# Patient Record
Sex: FEMALE | Race: BLACK OR AFRICAN AMERICAN | NOT HISPANIC OR LATINO | Employment: OTHER | URBAN - METROPOLITAN AREA
[De-identification: names, ages, dates, MRNs, and addresses within clinical notes are randomized per-mention and may not be internally consistent; named-entity substitution may affect disease eponyms.]

---

## 2017-01-10 ENCOUNTER — LAB VISIT (OUTPATIENT)
Dept: LAB | Facility: HOSPITAL | Age: 62
End: 2017-01-10
Attending: INTERNAL MEDICINE
Payer: MEDICAID

## 2017-01-10 ENCOUNTER — OFFICE VISIT (OUTPATIENT)
Dept: CARDIOLOGY | Facility: CLINIC | Age: 62
End: 2017-01-10
Payer: MEDICAID

## 2017-01-10 VITALS
HEART RATE: 71 BPM | SYSTOLIC BLOOD PRESSURE: 134 MMHG | WEIGHT: 179 LBS | HEIGHT: 64 IN | BODY MASS INDEX: 30.56 KG/M2 | DIASTOLIC BLOOD PRESSURE: 84 MMHG

## 2017-01-10 DIAGNOSIS — I43 CARDIOMYOPATHY DUE TO HYPERTENSION, WITH HEART FAILURE: ICD-10-CM

## 2017-01-10 DIAGNOSIS — I11.0 CARDIOMYOPATHY DUE TO HYPERTENSION, WITH HEART FAILURE: Primary | ICD-10-CM

## 2017-01-10 DIAGNOSIS — I34.0 MODERATE MITRAL INSUFFICIENCY: ICD-10-CM

## 2017-01-10 DIAGNOSIS — I10 ESSENTIAL HYPERTENSION: ICD-10-CM

## 2017-01-10 DIAGNOSIS — I25.119 CORONARY ARTERY DISEASE INVOLVING NATIVE CORONARY ARTERY OF NATIVE HEART WITH ANGINA PECTORIS: ICD-10-CM

## 2017-01-10 DIAGNOSIS — I11.0 CARDIOMYOPATHY DUE TO HYPERTENSION, WITH HEART FAILURE: ICD-10-CM

## 2017-01-10 DIAGNOSIS — I43 CARDIOMYOPATHY DUE TO HYPERTENSION, WITH HEART FAILURE: Primary | ICD-10-CM

## 2017-01-10 LAB
ANION GAP SERPL CALC-SCNC: 7 MMOL/L
BUN SERPL-MCNC: 20 MG/DL
CALCIUM SERPL-MCNC: 9.6 MG/DL
CHLORIDE SERPL-SCNC: 105 MMOL/L
CO2 SERPL-SCNC: 28 MMOL/L
CREAT SERPL-MCNC: 1.2 MG/DL
EST. GFR  (AFRICAN AMERICAN): 56 ML/MIN/1.73 M^2
EST. GFR  (NON AFRICAN AMERICAN): 49 ML/MIN/1.73 M^2
GLUCOSE SERPL-MCNC: 89 MG/DL
POTASSIUM SERPL-SCNC: 4.1 MMOL/L
SODIUM SERPL-SCNC: 140 MMOL/L

## 2017-01-10 PROCEDURE — 80048 BASIC METABOLIC PNL TOTAL CA: CPT

## 2017-01-10 PROCEDURE — 99999 PR PBB SHADOW E&M-EST. PATIENT-LVL III: CPT | Mod: PBBFAC,,, | Performed by: INTERNAL MEDICINE

## 2017-01-10 PROCEDURE — 99213 OFFICE O/P EST LOW 20 MIN: CPT | Mod: PBBFAC,PO | Performed by: INTERNAL MEDICINE

## 2017-01-10 PROCEDURE — 99214 OFFICE O/P EST MOD 30 MIN: CPT | Mod: S$PBB,,, | Performed by: INTERNAL MEDICINE

## 2017-01-10 PROCEDURE — 36415 COLL VENOUS BLD VENIPUNCTURE: CPT

## 2017-01-10 NOTE — MR AVS SNAPSHOT
[facility]  [address], [address]  [place] [address]  Phone: [phone]                  Christina Tomlinson   1/10/2017 4:00 PM   Office Visit    Description:  Female : 1955   Provider:  Chan Soolrio MD   Department:  [facility]           Reason for Visit     Follow-up           Diagnoses this Visit        Comments    Cardiomyopathy due to hypertension, with heart failure    -  Primary     Coronary artery disease involving native coronary artery of native heart with angina pectoris         Essential hypertension         Moderate mitral insufficiency                To Do List           Future Appointments        Provider Department Dept Phone    1/10/2017 10:40 AM APPOINTMENT LAB, [place] [facility] [phone]    1/10/2017 4:00 PM Chan Solorio MD [facility] [phone]    2017 11:00 AM CARDIOPULMONARY PROCEDURES, [place] [facility] [phone]      Goals (5 Years of Data)     None      Follow-Up and Disposition     Return in about 4 months (around 5/10/2017).    Follow-up and Disposition History      [facility]     [facility] -  Assistance  Registered nurses in the [facility] provide clinical advisement, health education, appointment booking, and other advisory services.  Call for this free service at [phone].             Medications           Message regarding Medications     Verify the changes and/or additions to your medication regime listed below are the same as discussed with your clinician today.  If any of these changes or additions are incorrect, please notify your healthcare provider.             Verify that the below list of medications is an accurate representation of the medications you are currently taking.  If none reported, the list may be blank. If incorrect, please contact your healthcare provider. Carry this list with you in case of  "emergency.           Current Medications     acetaminophen (TYLENOL) 500 MG tablet Take 500 mg by mouth every 6 (six) hours as needed for Pain.    atorvastatin (LIPITOR) 40 MG tablet Take 1 tablet (40 mg total) by mouth once daily.    cholecalciferol, vitamin D3, 3,000 unit Tab Take by mouth.    ferrous gluconate (FERGON) 240 (27 FE) MG tablet Take 480 mg by mouth 3 (three) times daily.    folic acid (FOLVITE) 800 MCG Tab Take 800 mcg by mouth once daily.    furosemide (LASIX) 40 MG tablet Take 1 tablet (40 mg total) by mouth once daily.    hydroxychloroquine (PLAQUENIL) 200 mg tablet Take 200 mg by mouth once daily.    lisinopril (PRINIVIL,ZESTRIL) 20 MG tablet Take 1 tablet (20 mg total) by mouth once daily.    methotrexate 2.5 MG Tab Take 15 mg by mouth.     metoprolol succinate (TOPROL-XL) 50 MG 24 hr tablet Take 1 tablet (50 mg total) by mouth once daily.    aspirin (ECOTRIN) 81 MG EC tablet Take 1 tablet (81 mg total) by mouth once.           Clinical Reference Information           Vital Signs - Last Recorded  Most recent update: 1/10/2017  9:37 AM by Adriane M Toussaint, LPN    BP Pulse Ht Wt BMI    134/84 71 5' 4" (1.626 m) 81.2 kg (179 lb) 30.73 kg/m2      Blood Pressure          Most Recent Value    BP  134/84      Allergies as of 1/10/2017     No Known Allergies      Immunizations Administered on Date of Encounter - 1/10/2017     None      Orders Placed During Today's Visit     Future Labs/Procedures Expected by Expires    Basic metabolic panel  1/10/2017 3/11/2018    2D Echo w/ Color Flow Doppler  As directed 1/10/2018      "

## 2017-01-10 NOTE — PROGRESS NOTES
Subjective:   Patient ID:  Christina Tomlinson is a 61 y.o. female who presents for follow-up of Follow-up      Problem List Items Addressed This Visit        Cardiac    Cardiomyopathy due to hypertension, with heart failure - Primary    Essential hypertension    Coronary artery disease involving native coronary artery of native heart with angina pectoris    Moderate mitral insufficiency          HPI: Patient is doing well with no acute concerns. REYNA with no chest pain. She is no longer working secondary to decrease functional capacity. She is able to walk a block. BP is controlled.        LHC showed moderate disease. RCA small vessel. Mild to moderate MR after diuresis. No mitral valve pathology. MR functional.       Review of Systems   Constitution: Negative.   HENT: Negative.    Eyes: Negative.    Cardiovascular: Negative for chest pain, claudication, cyanosis, dyspnea on exertion, irregular heartbeat, leg swelling, near-syncope, orthopnea, palpitations, paroxysmal nocturnal dyspnea and syncope.   Respiratory: Negative.    Endocrine: Negative.    Hematologic/Lymphatic: Negative.    Skin: Negative.    Musculoskeletal: Negative.    Gastrointestinal: Negative.    Neurological: Negative.    Psychiatric/Behavioral: Negative.      Patient's Medications   New Prescriptions    No medications on file   Previous Medications    ACETAMINOPHEN (TYLENOL) 500 MG TABLET    Take 500 mg by mouth every 6 (six) hours as needed for Pain.    ASPIRIN (ECOTRIN) 81 MG EC TABLET    Take 1 tablet (81 mg total) by mouth once.    ATORVASTATIN (LIPITOR) 40 MG TABLET    Take 1 tablet (40 mg total) by mouth once daily.    CHOLECALCIFEROL, VITAMIN D3, 3,000 UNIT TAB    Take by mouth.    FERROUS GLUCONATE (FERGON) 240 (27 FE) MG TABLET    Take 480 mg by mouth 3 (three) times daily.    FOLIC ACID (FOLVITE) 800 MCG TAB    Take 800 mcg by mouth once daily.    FUROSEMIDE (LASIX) 40 MG TABLET    Take 1 tablet (40 mg total) by mouth once daily.     HYDROXYCHLOROQUINE (PLAQUENIL) 200 MG TABLET    Take 200 mg by mouth once daily.    LISINOPRIL (PRINIVIL,ZESTRIL) 20 MG TABLET    Take 1 tablet (20 mg total) by mouth once daily.    METHOTREXATE 2.5 MG TAB    Take 15 mg by mouth.     METOPROLOL SUCCINATE (TOPROL-XL) 50 MG 24 HR TABLET    Take 1 tablet (50 mg total) by mouth once daily.   Modified Medications    No medications on file   Discontinued Medications    No medications on file       Objective:   Physical Exam   Constitutional: She is oriented to person, place, and time. She appears well-developed and well-nourished. No distress.   Examination of the digits showed no clubbing or cyanosis   HENT:   Head: Normocephalic and atraumatic.   Eyes: Conjunctivae are normal. Pupils are equal, round, and reactive to light. Right eye exhibits no discharge.   Neck: Normal range of motion. Neck supple. No JVD present. No thyromegaly present.   No carotid bruits   Cardiovascular: Normal rate, regular rhythm, S1 normal, S2 normal, intact distal pulses and normal pulses.  PMI is not displaced.  Exam reveals no gallop, no friction rub and no opening snap.    Murmur heard.  Pulmonary/Chest: Effort normal and breath sounds normal. No respiratory distress. She has no wheezes. She has no rales. She exhibits no tenderness.   Abdominal: Soft. Bowel sounds are normal. She exhibits no distension and no mass. There is no tenderness. There is no guarding.   No hepatosplenomegaly   Musculoskeletal: Normal range of motion. She exhibits no edema or tenderness.   Lymphadenopathy:     She has no cervical adenopathy.   Neurological: She is alert and oriented to person, place, and time.   Skin: Skin is warm. No rash noted. She is not diaphoretic. No erythema.   Psychiatric: She has a normal mood and affect.   Nursing note and vitals reviewed.      ECGs reviewed-NSR with LVH  LABS reviewed  Imaging including Echoes reviewed    Assessment:     1. Cardiomyopathy due to hypertension, with heart  failure    2. Coronary artery disease involving native coronary artery of native heart with angina pectoris    3. Essential hypertension    4. Moderate mitral insufficiency        Plan:     Continue current medications  Low salt diet  2D echo complete  BMP  F/u in 4 months.

## 2017-01-16 ENCOUNTER — HOSPITAL ENCOUNTER (OUTPATIENT)
Dept: CARDIOLOGY | Facility: HOSPITAL | Age: 62
Discharge: HOME | End: 2017-01-16
Attending: INTERNAL MEDICINE
Payer: MEDICAID

## 2017-01-16 DIAGNOSIS — I25.119 CORONARY ARTERY DISEASE INVOLVING NATIVE CORONARY ARTERY OF NATIVE HEART WITH ANGINA PECTORIS: ICD-10-CM

## 2017-01-16 DIAGNOSIS — I34.0 MODERATE MITRAL INSUFFICIENCY: ICD-10-CM

## 2017-01-16 DIAGNOSIS — I43 CARDIOMYOPATHY DUE TO HYPERTENSION, WITH HEART FAILURE: ICD-10-CM

## 2017-01-16 DIAGNOSIS — I11.0 CARDIOMYOPATHY DUE TO HYPERTENSION, WITH HEART FAILURE: ICD-10-CM

## 2017-01-16 LAB
DIASTOLIC DYSFUNCTION: NO
ESTIMATED PA SYSTOLIC PRESSURE: 18.68
MITRAL VALVE MOBILITY: NORMAL
MITRAL VALVE REGURGITATION: ABNORMAL
RETIRED EF AND QEF - SEE NOTES: 45 (ref 55–65)
TRICUSPID VALVE REGURGITATION: ABNORMAL

## 2017-01-16 PROCEDURE — 93306 TTE W/DOPPLER COMPLETE: CPT | Mod: PO

## 2017-01-16 PROCEDURE — 93306 TTE W/DOPPLER COMPLETE: CPT | Mod: 26,,, | Performed by: INTERNAL MEDICINE

## 2017-02-20 RX ORDER — LISINOPRIL 20 MG/1
TABLET ORAL
Qty: 30 TABLET | Refills: 6 | Status: SHIPPED | OUTPATIENT
Start: 2017-02-20 | End: 2018-04-09

## 2017-04-04 ENCOUNTER — CLINICAL SUPPORT (OUTPATIENT)
Dept: REHABILITATION | Facility: HOSPITAL | Age: 62
End: 2017-04-04
Attending: INTERNAL MEDICINE
Payer: MEDICAID

## 2017-04-04 DIAGNOSIS — M25.612 DECREASED ROM OF LEFT SHOULDER: ICD-10-CM

## 2017-04-04 DIAGNOSIS — M25.512 LEFT SHOULDER PAIN, UNSPECIFIED CHRONICITY: ICD-10-CM

## 2017-04-04 PROCEDURE — 97165 OT EVAL LOW COMPLEX 30 MIN: CPT

## 2017-04-05 NOTE — PLAN OF CARE
TIME RECORD    Date: 04/04/2017    Start Time:  8:15  Stop Time:  9:00    PROCEDURES:    TIMED  Procedure Min.                 UNTIMED  Procedure Min.   IE 45         Total Timed Minutes:  0  Total Timed Units:  0  Total Untimed Units:  1  Charges Billed/# of units:  1IE    OCCUPATIONAL THERAPY INITIAL EVALUATION & PLAN OF TREATMENT    Patient Name: Christina Tomlisnon  Physician Name:  Dr. Aguirre  Primary Diagnosis:  L shoulder pain, RTC tear  Treatment Diagnosis:  L shoulder pain, decreased ROM  Onset Date:  ~6 mo ago  Eval Date:  4/4/17  Certification Period:  4/4/17 to 6/4/17  Past Medical History:   Past Medical History:   Diagnosis Date    Arthritis     Cardiomyopathy     Coronary artery disease     GERD (gastroesophageal reflux disease)     Hypertension      Past Surgical History:   Procedure Laterality Date    CHOLECYSTECTOMY      COLONOSCOPY N/A 2/1/2016    Procedure: COLONOSCOPY;  Surgeon: Aidan Reynoso Jr., MD;  Location: [facility];  Service: Endoscopy;  Laterality: N/A;    HYSTERECTOMY      JOINT REPLACEMENT Right     TKA    KNEE ARTHROSCOPY      OTHER SURGICAL HISTORY      PARATHYROIDECTOMY      TONSILLECTOMY         Precautions:  standard  Prior Therapy:  Prior therapy on L shoulder ~2 years ago at [facility]  Signs of Abuse: no  Medications: Christina Tomlinson has a current medication list which includes the following prescription(s): acetaminophen, aspirin, atorvastatin, cholecalciferol (vitamin d3), ferrous gluconate, folic acid, furosemide, hydroxychloroquine, lisinopril, methotrexate, and metoprolol succinate.  Nutrition:  WDWN  Prior Level of Function: Independent, but assist prn with dressing  Social History:  Pt lives with her grown daughter. Pt reports she sometimes uses a back scratcher to pull pants up. Pt reports she retired last year.  Functional Deficits Leading to Referral/Nature of Injury:  repetetive strain  Patient Therapy Goals:  To have no more pain  Hand dominance:  "Right  X-Rays/Tests: no recent x-ray or MRI available in chart; pt reports a recent x-ray was taken by Dr. Aguirre    Subjective:  Pt reports "my arm is swollen. Sometimes it swells."  Pain:  During no work: 0/10  While workin-6/10  Sleepin/10  Location of pain: superior/posterior shoulder  **pt also reports pain in hand/wrist in the mornings    Objective:  Sensation Test: Patient denies any numbness/tingling    Observation/Inspection:  depressed scapula, protracted scapula, decreased disassociation of scapula with shoulder ROM    Range of Motion:   Shoulder Right Left Pain/Dysfunction with Movement    AROM AROM    flexion 90 105 Pain with attempted MMT   extension 50 35    abduction 85 85 Pain in B   adduction To neutral 5    Internal rotation 40 30 Pain in B   ER at 90° abd 45 45 Pain in B   ER at 0° abd 65 30    **MMT not tested due to pain    ROM Comments:   Pain at end range    Painful Arc:   Patient demonstrates no painful arc in shoulder flexion or abduction    Special Tests:  Positive: Empty can test and lift off sign    Palpation: (for pain)     Positive: Anterior Subacromial Space, Posterior Subacromial Space, Infraspinatus Region and Supraspinatus Region     Negative: SC joint, Coracoid process and Lateral Subacromial Space    Limitations of Functional Status:   Self Care: requires increase time to don clothes, inability or pain with donning bra and reaching overhead, sometimes needs assist with UE dressing and LE dressing  Work: occasional pain when cleaning  Leisure: denies leisure limitations, "I don't do a whole lot because I have CHF."    FOTO (Focus on Therapeutic Outcomes): 73% limitation with UE function.    Treatment included: OT evaluation, the following exercises (HEP) were instructed and Christina was able to demonstrate them prior to the end of the session. HEP are as follows: pendulums, shoulder shrugs, scapular squeezes, and wall slides as tolerated. Pt instructed on modalities as " needed.       Assessment  This 61 y.o. female referred to Outpatient Occupational Therapy with diagnosis of   Encounter Diagnoses   Name Primary?    Left shoulder pain, unspecified chronicity     Decreased ROM of left shoulder     presents with limitations as described in problem list. Pt also presents with R shoulder deficits.  Pt would benefit from OT treatment for B shoulders. Patient can benefit from Occupational Therapy services for Ultrasound, moist heat, PROM, AAROM, AROM, Theraputic exercises, joint mobs, home exercise program provied with written instructions, ice, Ice massage, strengthening, Theraband Ex, UBE and pulley ex in order to maximize painfree functional use of  bilateral UE. . The following goals were discussed with the patient and she is in agreement with them as to be addressed in the treatment plan.   History Examination Decision Making Complexity Score   Occupational Profile:   Performed expanded hx of pt using chart review and pt interview. Pt is retired, drives, lives with her grown daughter. PLOF independent.    Medical and Therapy History: Prior therapy on shoulder ~2 years ago. Comorbidities include CHF, BMI over 30, HTN, and arthritis    MODERATE     Performance Deficits     Physical  -due to pain, decreased ROM, and weakness, pt requires occasional assist dressing, limited with washing her back, and difficulty reaching overhead    Cognitive  -n/a    Psychosocial:    -n/a    LOW Pt has multiple treatment options, required no modifications during eval.  A comprehensive assessment was performed of pt.    LOW LOW             Problem List:   Decreased function of Right UE, Decreased function of Left UE, Decreased ROM, Increased pain, Decreased strength, Hypomobility, Inability to perform work/tasks, Difficulty sleeping and Inability to perform self care tasks    Rehab Potential: good    Goals to be met in 4 weeks: (5/4/17)  1) Initiate Hep   2) Pt will increase B shoulder AROM by 10  degrees grossly for improved performance with overhead ADL's  3) Pt will report 3/10 pain in (L)shoulder at worst  4) Pt will demonstrate increased MMT to 3+/5 grossly in B shoulders      Goals to be met by discharge:  1) Independent with HEP  2) Pt will demonstrate (L/R) shoulder AROM WFL grossly for [place] with ADL's  3) Pt will demonstrate (L/R) shoulder MMT WFL grossly for [place] with functional activities  4) Independent and pain free with ADL's and IADL's  5) Patient will be able to achieve FOTO score less than or equal to 46% limitation with UE function.     Plan  Recommended Treatment Plan (2 times per week for 8 weeks): Therapeutic Exercise, Functional Activities, Patient Education, Home Exercise Program, ADL Training, Ultrasound/Phonophoresis, Edema Control, Electrical Stimulation/TENS/Interferential, Moist Heat/Ice and Manual Therapy  Other Recommendations:  KT, IASTM as needed. Pt would benefit from OT treatment for bilateral shoulders. Request to treat the R shoulder as well as the L shoulder.    Therapist's Name: BASHIR Stewart  Date: 04/04/2017    I CERTIFY THE NEED FOR THESE SERVICES FURNISHED UNDER THIS PLAN OF TREATMENT AND WHILE UNDER MY CARE    Physician's comments: ________________________________________________________________________________________________________________________________________________      Physician's Name: ___________________________________    I certify the need for these services furnished under this plan of treatment and while under my care.  Everette Aguirre MD

## 2017-04-11 ENCOUNTER — TELEPHONE (OUTPATIENT)
Dept: CARDIOLOGY | Facility: CLINIC | Age: 62
End: 2017-04-11

## 2017-04-11 NOTE — TELEPHONE ENCOUNTER
----- Message from Bg Blackwood sent at 4/11/2017  4:02 PM CDT -----  Contact:  Patient - [phone]  or Jyothi,daughter- [phone]  Patient daughter came into the office and had questions about her mother's medications. States she has new rx for Meloxicam 7.5 mg , prescribed by Dr. Everette Aguirre and also Loratadine 10 mg tab, prescribed by Dr. Mireille Byrnes. Would like to know if it is ok for patient to take. Please call.

## 2017-04-25 ENCOUNTER — CLINICAL SUPPORT (OUTPATIENT)
Dept: REHABILITATION | Facility: HOSPITAL | Age: 62
End: 2017-04-25
Attending: INTERNAL MEDICINE
Payer: MEDICAID

## 2017-04-25 DIAGNOSIS — M25.512 LEFT SHOULDER PAIN, UNSPECIFIED CHRONICITY: ICD-10-CM

## 2017-04-25 DIAGNOSIS — M25.612 DECREASED ROM OF LEFT SHOULDER: ICD-10-CM

## 2017-04-25 PROCEDURE — 97530 THERAPEUTIC ACTIVITIES: CPT

## 2017-04-25 NOTE — PROGRESS NOTES
"TIME RECORD    Date:  04/25/2017    Start Time:  8:05  Stop Time:  8:50  Visit: 2/17    PROCEDURES:    TIMED  Procedure Min.   TA 45                     UNTIMED  Procedure Min.             Total Timed Minutes:  45  Total Timed Units:  3  Total Untimed Units:  0  Charges Billed/# of units:  3TA      Progress/Current Status    Subjective:     Patient ID: Christina Tomlinson is a 61 y.o. female.  Diagnosis:   1. Left shoulder pain, unspecified chronicity     2. Decreased ROM of left shoulder       Pain:   Pt did not rate pain, but reported "a little". Pt reports compliance with HEP. Pt denied pain at end of session.    Objective:     Pt seen by OT this session. Treatment consisted of the following:     Date: 4/25/17    Visit: 1   pulleys 3 min FF   Scapular squeezes with ER 10 reps, against wall with towel roll   Wall slides Not today   PROM 10 reps, all planes   Dowel - FF 10 reps   Serratus punches  10 reps B UE   SL ER/ABD 10 reps, B UE   tband- red    -rows 10 reps   -ext 2 x 10 reps   -IR/ER 10 reps each   -biceps curls 2 x 10 reps   UBE - L2.5 2'30" forward, 2'30" back             Assessment:     Pt tolerated treatment fairly well today without c/o increased pain. Pt reports her pain has improved somewhat since eval. Pt reports compliance with HEP.  Pt cont to present with min pain in L shoulder, weakness, and decreased scapular strength.  Pt with good participation. Pt would cont to benefit from skilled OT services to maximize functional use of B UE    Patient Education/Response:     Cont HEP. Added light shoulder/scapular strengthening with theraband, rows, ext, IR/ER, biceps curls and dowel ex for FF.  Pt given red theraband and handout. Pt verbalized and demonstrated understanding of HEP.    Plans and Goals:     Cont OT poc 2x/week for 8 weeks during certification period 4/4/17 to 6/4/17 in pursuit of established goals.    Goals to be met in 4 weeks: (5/4/17)  1) Initiate Hep   2) Pt will increase B shoulder " AROM by 10 degrees grossly for improved performance with overhead ADL's  3) Pt will report 3/10 pain in (L)shoulder at worst  4) Pt will demonstrate increased MMT to 3+/5 grossly in B shoulders        Goals to be met by discharge:  1) Independent with HEP  2) Pt will demonstrate (L/R) shoulder AROM WFL grossly for [place] with ADL's  3) Pt will demonstrate (L/R) shoulder MMT WFL grossly for [place] with functional activities  4) Independent and pain free with ADL's and IADL's  5) Patient will be able to achieve FOTO score less than or equal to 46% limitation with UE function.     BASHIR Stewart

## 2017-04-27 ENCOUNTER — CLINICAL SUPPORT (OUTPATIENT)
Dept: REHABILITATION | Facility: HOSPITAL | Age: 62
End: 2017-04-27
Attending: INTERNAL MEDICINE
Payer: MEDICAID

## 2017-04-27 DIAGNOSIS — M25.512 LEFT SHOULDER PAIN, UNSPECIFIED CHRONICITY: ICD-10-CM

## 2017-04-27 DIAGNOSIS — M25.612 DECREASED ROM OF LEFT SHOULDER: ICD-10-CM

## 2017-04-27 PROCEDURE — 97530 THERAPEUTIC ACTIVITIES: CPT

## 2017-04-27 NOTE — PROGRESS NOTES
"TIME RECORD    Date:  04/27/2017    Start Time:  8:10  Stop Time:  9:00  Visit: 3/17    PROCEDURES:    TIMED  Procedure Min.   TA 45                     UNTIMED  Procedure Min.   CP 5         Total Timed Minutes:  45  Total Timed Units:  3  Total Untimed Units:  0  Charges Billed/# of units:  3TA      Progress/Current Status    Subjective:     Patient ID: Christina Tomlinson is a 61 y.o. female.  Diagnosis:   1. Left shoulder pain, unspecified chronicity     2. Decreased ROM of left shoulder       Pain:   Pt did not rate pain, but reported "it doesn't feel too bad. My knees are what hurt". Pt reports compliance with HEP.     Objective:     Pt seen by OT this session. Treatment consisted of the following:     Date: 4/27/17    Visit: 3   pulleys 3 min FF   Scapular squeezes with ER 10 reps, against wall with towel roll   Wall slides 10 reps, as tolerated   PROM 10 reps, all planes   Dowel - FF 10 reps   Serratus punches  10 reps B UE   SL ER/ABD 10 reps, B UE   tband- red    -rows 2 x 10 reps   -ext 2 x 10 reps   -IR/ER 2 x 10 reps each   -biceps curls 2 x 10 reps (deferred due to wrist pain, performed elbow flex with 1# x 10 reps, 3 ways)   UBE - L3.5 2'30" forward, 2'30" back         Patient received cold pack x 5 minutes to B shoulders to decrease pain/inflammation and edema following treatment session.       Assessment:     Pt tolerated treatment fairly well today with min c/o increased pain. She reports pain in B shoulders with FF.  Pt also c/o knee and wrist pain today.  Tightness noted at end ROM with PROM.  Pt reports compliance with HEP.  Pt cont to present with min pain in L shoulder, weakness, and decreased scapular strength.  Pt with good participation. Pt would cont to benefit from skilled OT services to maximize functional use of B UE    Patient Education/Response:     Cont HEP. Added light shoulder/scapular strengthening with theraband, rows, ext, IR/ER, biceps curls and dowel ex for FF.  Pt given red " theraband and handout. Pt verbalized and demonstrated understanding of HEP.    Plans and Goals:     Cont OT poc 2x/week for 8 weeks during certification period 4/4/17 to 6/4/17 in pursuit of established goals.    Goals to be met in 4 weeks: (5/4/17)  1) Initiate Hep   2) Pt will increase B shoulder AROM by 10 degrees grossly for improved performance with overhead ADL's  3) Pt will report 3/10 pain in (L)shoulder at worst  4) Pt will demonstrate increased MMT to 3+/5 grossly in B shoulders        Goals to be met by discharge:  1) Independent with HEP  2) Pt will demonstrate (L/R) shoulder AROM WFL grossly for [place] with ADL's  3) Pt will demonstrate (L/R) shoulder MMT WFL grossly for [place] with functional activities  4) Independent and pain free with ADL's and IADL's  5) Patient will be able to achieve FOTO score less than or equal to 46% limitation with UE function.     BASHIR Stewart

## 2017-05-02 ENCOUNTER — CLINICAL SUPPORT (OUTPATIENT)
Dept: REHABILITATION | Facility: HOSPITAL | Age: 62
End: 2017-05-02
Attending: INTERNAL MEDICINE
Payer: MEDICAID

## 2017-05-02 DIAGNOSIS — M25.612 DECREASED ROM OF LEFT SHOULDER: ICD-10-CM

## 2017-05-02 DIAGNOSIS — M25.512 LEFT SHOULDER PAIN, UNSPECIFIED CHRONICITY: ICD-10-CM

## 2017-05-02 PROCEDURE — 97530 THERAPEUTIC ACTIVITIES: CPT

## 2017-05-02 NOTE — PROGRESS NOTES
"TIME RECORD    Date:  05/02/2017    Start Time:  8:00  Stop Time:  8:55  Visit: 4/17    PROCEDURES:    TIMED  Procedure Min.   TA 45 (30 min 1:1, 15 min supervised)                     UNTIMED  Procedure Min.   CP    MHP 10     Total Timed Minutes:  45  Total Timed Units:  3  Total Untimed Units:  0  Charges Billed/# of units:  2TA      Progress/Current Status    Subjective:     Patient ID: Christina Tomlinson is a 61 y.o. female.  Diagnosis:   1. Left shoulder pain, unspecified chronicity     2. Decreased ROM of left shoulder       Pain:   Pt did not rate pain, but reported "it feels a little better today. I was hurting last time. I think it was the ice". Pt reports compliance with HEP.     Objective:     Pt seen by OT this session. Treatment consisted of the following:    Patient received MH x 10 min to B shoulders to increase blood flow, circulation and tissue elasticity prior to therex       Date: 5/2/17    Visit: 4   Pulleys 3 min FF   Scapular squeezes with ER 10 reps, against wall with towel roll   Wall slides 10 reps, as tolerated   PROM- 10 reps, all planes   Dowel - FFABD 2 x 10 reps   Serratus punches-  10 reps B UE   SL ER/ABD- 10 reps, B UE   tband- red    -rows 2 x 10 reps   -ext 2 x 10 reps   -IR/ER 2 x 10 reps each   -biceps curls 2 x 10 reps (deferred due to wrist pain, performed elbow flex with 1# x 10 reps, 3 ways)   UBE - L3.5- 2'30" forward, 2'30" back         Patient declined cold pack       Assessment:     Pt tolerated treatment fairly well today with min c/o increased pain in R deltoid, especially with ABD.   Tightness noted at end ROM with PROM.  Pt reports compliance with HEP.  Pt cont to present with min pain in L/R shoulder, weakness, and decreased scapular strength.  Pt with good participation. Pt would cont to benefit from skilled OT services to maximize functional use of B UE.    Patient Education/Response:     Cont HEP.     Plans and Goals:     Cont OT poc 2x/week for 8 weeks during " certification period 4/4/17 to 6/4/17 in pursuit of established goals.    Goals to be met in 4 weeks: (5/4/17)  1) Initiate Hep   2) Pt will increase B shoulder AROM by 10 degrees grossly for improved performance with overhead ADL's  3) Pt will report 3/10 pain in (L)shoulder at worst  4) Pt will demonstrate increased MMT to 3+/5 grossly in B shoulders        Goals to be met by discharge:  1) Independent with HEP  2) Pt will demonstrate (L/R) shoulder AROM WFL grossly for [place] with ADL's  3) Pt will demonstrate (L/R) shoulder MMT WFL grossly for [place] with functional activities  4) Independent and pain free with ADL's and IADL's  5) Patient will be able to achieve FOTO score less than or equal to 46% limitation with UE function.     BASHIR Stewart

## 2017-05-04 ENCOUNTER — CLINICAL SUPPORT (OUTPATIENT)
Dept: REHABILITATION | Facility: HOSPITAL | Age: 62
End: 2017-05-04
Attending: INTERNAL MEDICINE
Payer: MEDICAID

## 2017-05-04 DIAGNOSIS — M25.612 DECREASED ROM OF LEFT SHOULDER: ICD-10-CM

## 2017-05-04 DIAGNOSIS — M25.512 LEFT SHOULDER PAIN, UNSPECIFIED CHRONICITY: ICD-10-CM

## 2017-05-04 PROCEDURE — 97530 THERAPEUTIC ACTIVITIES: CPT

## 2017-05-04 NOTE — PROGRESS NOTES
"TIME RECORD    Date:  05/04/2017    Start Time:  8:10  Stop Time:  8:55  Visit: 5/17    PROCEDURES:    TIMED  Procedure Min.   TA 45 1:1                     UNTIMED  Procedure Min.   CP    MHP      Total Timed Minutes:  45  Total Timed Units:  3  Total Untimed Units:  0  Charges Billed/# of units:  3TA      Progress/Current Status    Subjective:     Patient ID: Christina Tomlinson is a 61 y.o. female.  Diagnosis:   1. Left shoulder pain, unspecified chronicity     2. Decreased ROM of left shoulder       Pain:   Pt did not rate pain, but reported "I just have a little pain in my L shoulder". Pt reports compliance with HEP.     Objective:     Pt seen by OT this session. Treatment consisted of the following:     Date: 5/4/17    Visit: 5   Pulleys 3 min FF   Scapular squeezes with ER 10 reps, against wall with towel roll   Wall slides 10 reps, as tolerated   PROM- 10 reps, all planes   Dowel - FF/ABD 2 x 10 reps, 10 reps ABD   Serratus punches-  10 reps B UE   SL ER/ABD- 10 reps, B UE   tband- red    -rows 2 x 10 reps   -ext 2 x 10 reps   -IR/ER 2 x 10 reps each   -biceps curls 2 x 10 reps    UBE - L4 2'30" forward, 2'30" back   scap stabilization with ball on wall, shoulder @90 10 reps CW/CCW,  B UE     Patient declined cold pack       Assessment:     Pt tolerated treatment fairly well today with min c/o increased pain in R deltoid, especially with ABD.   Tightness noted at end ROM with PROM with FF and ABD.  Pt reports compliance with HEP.  Pt cont to present with min pain in L/R shoulder, weakness, and decreased scapular strength.  Pt with good participation. Pt progressing fairly well towards goals.  Pt would cont to benefit from skilled OT services to maximize functional use of B UE.    Patient Education/Response:     Cont HEP.     Plans and Goals:     Cont OT poc 2x/week for 8 weeks during certification period 4/4/17 to 6/4/17 in pursuit of established goals. Reassess next visit    Goals to be met in 4 weeks: " (5/4/17)  1) Initiate Hep   2) Pt will increase B shoulder AROM by 10 degrees grossly for improved performance with overhead ADL's  3) Pt will report 3/10 pain in (L)shoulder at worst  4) Pt will demonstrate increased MMT to 3+/5 grossly in B shoulders        Goals to be met by discharge:  1) Independent with HEP  2) Pt will demonstrate (L/R) shoulder AROM WFL grossly for [place] with ADL's  3) Pt will demonstrate (L/R) shoulder MMT WFL grossly for [place] with functional activities  4) Independent and pain free with ADL's and IADL's  5) Patient will be able to achieve FOTO score less than or equal to 46% limitation with UE function.     BASHIR Stewart

## 2017-05-09 ENCOUNTER — CLINICAL SUPPORT (OUTPATIENT)
Dept: REHABILITATION | Facility: HOSPITAL | Age: 62
End: 2017-05-09
Attending: INTERNAL MEDICINE
Payer: MEDICAID

## 2017-05-09 DIAGNOSIS — M25.512 LEFT SHOULDER PAIN, UNSPECIFIED CHRONICITY: ICD-10-CM

## 2017-05-09 DIAGNOSIS — M25.612 DECREASED ROM OF LEFT SHOULDER: ICD-10-CM

## 2017-05-09 PROCEDURE — 97530 THERAPEUTIC ACTIVITIES: CPT

## 2017-05-09 NOTE — PROGRESS NOTES
"TIME RECORD    Date:  05/09/2017    Start Time:  8:05  Stop Time:  8:55  Visit: 6/17    PROCEDURES:    TIMED  Procedure Min.   TA 45 (30 min 1:1, 15 min supervised)                     UNTIMED  Procedure Min.   CP    MHP 5     Total Timed Minutes:  45  Total Timed Units:  3  Total Untimed Units:  0  Charges Billed/# of units:  2TA      Progress/Current Status    Subjective:     Patient ID: Christina Tomlinson is a 61 y.o. female.  Diagnosis:   1. Left shoulder pain, unspecified chronicity     2. Decreased ROM of left shoulder       Pain:   Pt did not rate pain, but reported "I am doing ok. My shoulders aren't really hurting much today". Pt reports compliance with HEP.     Objective:     Pt seen by OT this session. Treatment consisted of the following:     Date: 5/9/17    Visit: 6   Pulleys 3 min FF   Scapular squeezes with ER 10 reps, against wall with towel roll   Wall slides 10 reps, as tolerated   PROM- 10 reps, all planes   Dowel - FF/ABD 2 x 10 reps, 10 reps ABD   Serratus punches-  10 reps B UE   SL ER/ABD- 10 reps, B UE   tband- red    -rows 2 x 10 reps   -ext 2 x 10 reps   -IR/ER 2 x 10 reps each   -biceps curls 2 x 10 reps    UBE - L4 2'30" forward, 2'30" back   scap stabilization with ball on wall, shoulder @90 10 reps CW/CCW,  B UE     Patient declined cold pack       Assessment:     Pt tolerated treatment fairly well today with cont min c/o increased pain in R deltoid, especially with ABD.   Tightness noted at end ROM with PROM with FF and ABD.  Pt reports compliance with HEP.  Pt cont to present with min pain in L/R shoulder, weakness, and decreased scapular strength.  Pt with good participation. Pt progressing fairly well towards goals.  Pt would cont to benefit from skilled OT services to maximize functional use of B UE.    Patient Education/Response:     Cont HEP.     Plans and Goals:     Cont OT poc 2x/week for 8 weeks during certification period 4/4/17 to 6/4/17 in pursuit of established goals. " Reassess next visit    Goals to be met in 4 weeks: (5/4/17)  1) Initiate Hep   2) Pt will increase B shoulder AROM by 10 degrees grossly for improved performance with overhead ADL's  3) Pt will report 3/10 pain in (L)shoulder at worst  4) Pt will demonstrate increased MMT to 3+/5 grossly in B shoulders        Goals to be met by discharge:  1) Independent with HEP  2) Pt will demonstrate (L/R) shoulder AROM WFL grossly for [place] with ADL's  3) Pt will demonstrate (L/R) shoulder MMT WFL grossly for [place] with functional activities  4) Independent and pain free with ADL's and IADL's  5) Patient will be able to achieve FOTO score less than or equal to 46% limitation with UE function.     BASHIR Stewart

## 2017-05-11 ENCOUNTER — OFFICE VISIT (OUTPATIENT)
Dept: CARDIOLOGY | Facility: CLINIC | Age: 62
End: 2017-05-11
Payer: MEDICAID

## 2017-05-11 VITALS
HEIGHT: 64 IN | SYSTOLIC BLOOD PRESSURE: 140 MMHG | OXYGEN SATURATION: 96 % | DIASTOLIC BLOOD PRESSURE: 83 MMHG | HEART RATE: 98 BPM | WEIGHT: 191.81 LBS | BODY MASS INDEX: 32.74 KG/M2

## 2017-05-11 DIAGNOSIS — I34.0 MODERATE MITRAL INSUFFICIENCY: ICD-10-CM

## 2017-05-11 DIAGNOSIS — I10 ESSENTIAL HYPERTENSION: ICD-10-CM

## 2017-05-11 DIAGNOSIS — I25.119 CORONARY ARTERY DISEASE INVOLVING NATIVE CORONARY ARTERY OF NATIVE HEART WITH ANGINA PECTORIS: ICD-10-CM

## 2017-05-11 DIAGNOSIS — I42.9 CARDIOMYOPATHY, UNSPECIFIED TYPE: Primary | ICD-10-CM

## 2017-05-11 PROCEDURE — 99214 OFFICE O/P EST MOD 30 MIN: CPT | Mod: S$PBB,,, | Performed by: INTERNAL MEDICINE

## 2017-05-11 PROCEDURE — 99213 OFFICE O/P EST LOW 20 MIN: CPT | Mod: PBBFAC,PO | Performed by: INTERNAL MEDICINE

## 2017-05-11 PROCEDURE — 99999 PR PBB SHADOW E&M-EST. PATIENT-LVL III: CPT | Mod: PBBFAC,,, | Performed by: INTERNAL MEDICINE

## 2017-05-11 NOTE — PROGRESS NOTES
Subjective:   Patient ID:  Christina Tomlinson is a 61 y.o. female who presents for follow-up of Follow-up      Problem List Items Addressed This Visit        Cardiac    Essential hypertension    Cardiomyopathy - Primary    Coronary artery disease involving native coronary artery of native heart with angina pectoris    Moderate mitral insufficiency          HPI: Patient is doing well with no acute concerns. REYNA with no chest pain. She is officially retired. She has gained 11 lbs since previous visit. She is trying to be more active. We discuss diet and activity that is acceptable.     No orthopnea or PND. BP is controlled. She is compliant with medications.       LHC showed moderate disease. RCA small vessel. Mild to moderate MR after diuresis. No mitral valve pathology. MR functional.       Review of Systems   Constitution: Negative.   HENT: Negative.    Eyes: Negative.    Cardiovascular: Negative for chest pain, claudication, cyanosis, dyspnea on exertion, irregular heartbeat, leg swelling, near-syncope, orthopnea, palpitations, paroxysmal nocturnal dyspnea and syncope.   Respiratory: Negative.    Endocrine: Negative.    Hematologic/Lymphatic: Negative.    Skin: Negative.    Musculoskeletal: Negative.    Gastrointestinal: Negative.    Neurological: Negative.    Psychiatric/Behavioral: Negative.      Patient's Medications   New Prescriptions    No medications on file   Previous Medications    ACETAMINOPHEN (TYLENOL) 500 MG TABLET    Take 500 mg by mouth every 6 (six) hours as needed for Pain.    ASPIRIN (ECOTRIN) 81 MG EC TABLET    Take 1 tablet (81 mg total) by mouth once.    ATORVASTATIN (LIPITOR) 40 MG TABLET    Take 1 tablet (40 mg total) by mouth once daily.    CHOLECALCIFEROL, VITAMIN D3, 3,000 UNIT TAB    Take by mouth.    FERROUS GLUCONATE (FERGON) 240 (27 FE) MG TABLET    Take 480 mg by mouth 3 (three) times daily.    FOLIC ACID (FOLVITE) 800 MCG TAB    Take 800 mcg by mouth once daily.    FUROSEMIDE  (LASIX) 40 MG TABLET    Take 1 tablet (40 mg total) by mouth once daily.    HYDROXYCHLOROQUINE (PLAQUENIL) 200 MG TABLET    Take 200 mg by mouth once daily.    LISINOPRIL (PRINIVIL,ZESTRIL) 20 MG TABLET    TAKE ONE TABLET BY MOUTH ONCE DAILY    METHOTREXATE 2.5 MG TAB    Take 15 mg by mouth.     METOPROLOL SUCCINATE (TOPROL-XL) 50 MG 24 HR TABLET    Take 1 tablet (50 mg total) by mouth once daily.   Modified Medications    No medications on file   Discontinued Medications    No medications on file       Objective:   Physical Exam   Constitutional: She is oriented to person, place, and time. She appears well-developed and well-nourished. No distress.   Examination of the digits showed no clubbing or cyanosis   HENT:   Head: Normocephalic and atraumatic.   Eyes: Conjunctivae are normal. Pupils are equal, round, and reactive to light. Right eye exhibits no discharge.   Neck: Normal range of motion. Neck supple. No JVD present. No thyromegaly present.   No carotid bruits   Cardiovascular: Normal rate, regular rhythm, S1 normal, S2 normal, intact distal pulses and normal pulses.  PMI is not displaced.  Exam reveals no gallop, no friction rub and no opening snap.    Murmur heard.  Pulmonary/Chest: Effort normal and breath sounds normal. No respiratory distress. She has no wheezes. She has no rales. She exhibits no tenderness.   Abdominal: Soft. Bowel sounds are normal. She exhibits no distension and no mass. There is no tenderness. There is no guarding.   No hepatosplenomegaly   Musculoskeletal: Normal range of motion. She exhibits no edema or tenderness.   Lymphadenopathy:     She has no cervical adenopathy.   Neurological: She is alert and oriented to person, place, and time.   Skin: Skin is warm. No rash noted. She is not diaphoretic. No erythema.   Psychiatric: She has a normal mood and affect.   Nursing note and vitals reviewed.      ECGs reviewed-NSR with LVH  LABS reviewed  Imaging including Echoes reviewed- ef 45%  with moderate MR  Angiographic Results     Diagnostic:          Patient has a left dominant coronary artery.        - Left Main Coronary Artery:             The ostial LM is normal. There is ARIELA 3 flow.     - Left Anterior Descending Artery:             The LAD is normal. There is ARIELA 3 flow.     - D1:             The D1 has luminal irregularities. There is ARIELA 3 flow.     - Ramus:             The proximal ramus has a 60% stenosis. There is ARIELA 3 flow.     - Left Circumflex Artery:             The LCX is normal. There is ARIELA 3 flow.     - Right Coronary Artery:             The proximal RCA has a 75% stenosis. There is ARIELA 3 flow. The remaining portion of the vessel is of small caliber.      Assessment:     1. Cardiomyopathy, unspecified type    2. Moderate mitral insufficiency    3. Coronary artery disease involving native coronary artery of native heart with angina pectoris    4. Essential hypertension        Plan:     Continue current medications  Low salt diet  Activity as tolerate  F/u in 4 months.

## 2017-05-11 NOTE — MR AVS SNAPSHOT
[facility]  [address], [address]  [place] [address]  Phone: [phone]                  Christina Tomlinson   2017 8:40 AM   Office Visit    Description:  Female : 1955   Provider:  Chan Solorio MD   Department:  [facility]           Reason for Visit     Follow-up           Diagnoses this Visit        Comments    Cardiomyopathy, unspecified type    -  Primary     Moderate mitral insufficiency         Coronary artery disease involving native coronary artery of native heart with angina pectoris         Essential hypertension                To Do List           Future Appointments        Provider Department Dept Phone    2017 1:20 PM Chan Solorio MD [facility] [phone]      Goals (5 Years of Data)     None      Follow-Up and Disposition     Return in about 4 months (around 2017).    Follow-up and Disposition History      [facility]     [facility] -  Assistance  Unless otherwise directed by your provider, please contact [facility], our nurse care line that is available for  assistance.     Registered nurses in the [facility] provide: appointment scheduling, clinical advisement, health education, and other advisory services.  Call: [phone] (toll free)               Medications           Message regarding Medications     Verify the changes and/or additions to your medication regime listed below are the same as discussed with your clinician today.  If any of these changes or additions are incorrect, please notify your healthcare provider.             Verify that the below list of medications is an accurate representation of the medications you are currently taking.  If none reported, the list may be blank. If incorrect, please contact your healthcare provider. Carry this list with you in case of emergency.           Current Medications     acetaminophen (TYLENOL) 500 MG tablet Take  "500 mg by mouth every 6 (six) hours as needed for Pain.    aspirin (ECOTRIN) 81 MG EC tablet Take 1 tablet (81 mg total) by mouth once.    atorvastatin (LIPITOR) 40 MG tablet Take 1 tablet (40 mg total) by mouth once daily.    cholecalciferol, vitamin D3, 3,000 unit Tab Take by mouth.    ferrous gluconate (FERGON) 240 (27 FE) MG tablet Take 480 mg by mouth 3 (three) times daily.    folic acid (FOLVITE) 800 MCG Tab Take 800 mcg by mouth once daily.    furosemide (LASIX) 40 MG tablet Take 1 tablet (40 mg total) by mouth once daily.    hydroxychloroquine (PLAQUENIL) 200 mg tablet Take 200 mg by mouth once daily.    lisinopril (PRINIVIL,ZESTRIL) 20 MG tablet TAKE ONE TABLET BY MOUTH ONCE DAILY    methotrexate 2.5 MG Tab Take 15 mg by mouth.     metoprolol succinate (TOPROL-XL) 50 MG 24 hr tablet Take 1 tablet (50 mg total) by mouth once daily.           Clinical Reference Information           Your Vitals Were     BP Pulse Height Weight SpO2 BMI    140/83 98 5' 4" (1.626 m) 87 kg (191 lb 12.8 oz) 96% 32.92 kg/m2      Blood Pressure          Most Recent Value    BP  (!)  140/83      Allergies as of 5/11/2017     No Known Allergies      Immunizations Administered on Date of Encounter - 5/11/2017     None      Language Assistance Services     ATTENTION: Language assistance services are available, free of charge. Please call [phone].      ATENCIÓN: Si habla aman, tiene a campos disposición servicios gratuitos de asistencia lingüística. Llame al [phone].     [facility] Ý: N?u b?n nói Ti?ng Vi?t, có các d?ch v? h? tr? ngôn ng? mi?n phí dành cho b?n. G?i s? [phone].         [facility] complies with applicable Federal civil rights laws and does not discriminate on the basis of race, color, national origin, age, disability, or sex.        "

## 2017-05-16 ENCOUNTER — CLINICAL SUPPORT (OUTPATIENT)
Dept: REHABILITATION | Facility: HOSPITAL | Age: 62
End: 2017-05-16
Attending: INTERNAL MEDICINE
Payer: MEDICAID

## 2017-05-16 DIAGNOSIS — M25.512 LEFT SHOULDER PAIN, UNSPECIFIED CHRONICITY: ICD-10-CM

## 2017-05-16 DIAGNOSIS — M25.612 DECREASED ROM OF LEFT SHOULDER: ICD-10-CM

## 2017-05-16 PROCEDURE — 97530 THERAPEUTIC ACTIVITIES: CPT

## 2017-05-16 NOTE — PROGRESS NOTES
"TIME RECORD    Date:  05/16/2017    Start Time:  8:10  Stop Time:  9:00  Visit: 7/17    PROCEDURES:    TIMED  Procedure Min.   TA 50 (30 min 1:1, 20 min supervised)                     UNTIMED  Procedure Min.   CP    MHP 5     Total Timed Minutes:  45  Total Timed Units:  3  Total Untimed Units:  0  Charges Billed/# of units:  2TA      Progress/Current Status    Subjective:     Patient ID: Christina Tomlinson is a 61 y.o. female.  Diagnosis:   1. Left shoulder pain, unspecified chronicity     2. Decreased ROM of left shoulder       Pain:   Pt did not rate pain, but reported "I am feeling ok today. My arm was hurting over the weekend". Pt reports compliance with HEP.     Objective:     Pt seen by OT this session. Treatment consisted of the following:     Date: 5/16/17    Visit: 7   Pulleys 3 min FF   Scapular squeezes with ER 10 reps, against wall with towel roll   Wall slides 10 reps, as tolerated   PROM- 10 reps, all planes   Dowel - FF 2 x 10 reps,    Serratus punches-  10 reps B UE   SL ER/ABD- 2 x 10 reps, B UE, 1# with ER   tband- red    -rows 2 x 10 reps   -ext 2 x 10 reps   -IR/ER 2 x 10 reps each   -biceps curls 2 x 10 reps    UBE - L4 2' forward, 2' back   scap stabilization with ball on wall, shoulder @90 10 reps CW/CCW,  B UE     Patient declined cold pack       Assessment:     Pt tolerated treatment fairly well today without c/o pain.   Tightness noted at end ROM with PROM with FF and ABD.  Pt reports compliance with HEP.  Pt cont to present with occasional min pain in L/R shoulder, weakness, and decreased scapular strength.  Pt with good participation. Pt progressing fairly well towards goals.  Pt would cont to benefit from skilled OT services to maximize functional use of B UE.    Patient Education/Response:     Cont HEP.     Plans and Goals:     Cont OT poc 2x/week for 8 weeks during certification period 4/4/17 to 6/4/17 in pursuit of established goals. Reassess next visit    Goals to be met in 4 " weeks: (5/4/17)  1) Initiate Hep   2) Pt will increase B shoulder AROM by 10 degrees grossly for improved performance with overhead ADL's  3) Pt will report 3/10 pain in (L)shoulder at worst  4) Pt will demonstrate increased MMT to 3+/5 grossly in B shoulders        Goals to be met by discharge:  1) Independent with HEP  2) Pt will demonstrate (L/R) shoulder AROM WFL grossly for [place] with ADL's  3) Pt will demonstrate (L/R) shoulder MMT WFL grossly for [place] with functional activities  4) Independent and pain free with ADL's and IADL's  5) Patient will be able to achieve FOTO score less than or equal to 46% limitation with UE function.     BASHIR Stewart

## 2017-05-23 ENCOUNTER — CLINICAL SUPPORT (OUTPATIENT)
Dept: REHABILITATION | Facility: HOSPITAL | Age: 62
End: 2017-05-23
Attending: INTERNAL MEDICINE
Payer: MEDICAID

## 2017-05-23 DIAGNOSIS — M25.512 LEFT SHOULDER PAIN, UNSPECIFIED CHRONICITY: ICD-10-CM

## 2017-05-23 DIAGNOSIS — M25.612 DECREASED ROM OF LEFT SHOULDER: ICD-10-CM

## 2017-05-23 PROCEDURE — 97530 THERAPEUTIC ACTIVITIES: CPT

## 2017-05-23 NOTE — PROGRESS NOTES
"TIME RECORD    Date:  05/23/2017    Start Time:  9:50  Stop Time:  10:50  Visit: 8/17    PROCEDURES:    TIMED  Procedure Min.   TA 60 (10 min supervised, 50 min 1:1)                     UNTIMED  Procedure Min.   CP    MHP      Total Timed Minutes:  60  Total Timed Units:  4  Total Untimed Units:  0  Charges Billed/# of units:  3TA      Progress/Current Status    Subjective:     Patient ID: Christina Tomlinson is a 61 y.o. female.  Diagnosis:   1. Left shoulder pain, unspecified chronicity     2. Decreased ROM of left shoulder       Pain: 0/10 at rest  Pt reported "I am feeling pretty good". Pt reports compliance with HEP.     Objective:     Pt seen by OT this session. Treatment consisted of the following:     Date: 5/23/17    Visit: 8   Pulleys 3 min FF   Scapular squeezes with ER 10 reps, against wall with towel roll   Wall slides 10 reps, as tolerated   PROM- 10 reps, all planes   Dowel - FF 2 x 10 reps, 2#   Serratus punches-  10 reps B UE   SL ER/ABD- 2 x 10 reps, B UE, 1# with ER   tband- red    -rows 3 x 10 reps   -ext 3 x 10 reps   -IR/ER 2 x 10 reps each   -biceps curls 2 x 10 reps    UBE - L4 2'30" forward, 2'30" back   scap stabilization with ball on wall, shoulder @90 10 reps CW/CCW,  B UE     Patient declined cold pack       Assessment:     Pt tolerated treatment fairly well today without c/o pain.   Tightness noted at end ROM in R shoulder with PROM with FF and ABD.  Pt reports compliance with HEP.  Pt cont to present with occasional min pain in L/R shoulder, weakness, and decreased scapular strength.  Pt with good participation. Pt progressing fairly well towards goals.  Pt would cont to benefit from skilled OT services to maximize functional use of B UE.    Patient Education/Response:     Cont HEP.     Plans and Goals:     Cont OT poc 2x/week for 8 weeks during certification period 4/4/17 to 6/4/17 in pursuit of established goals. Reassess next visit    Goals to be met in 4 weeks: (5/4/17)  1) Initiate " Hep   2) Pt will increase B shoulder AROM by 10 degrees grossly for improved performance with overhead ADL's  3) Pt will report 3/10 pain in (L)shoulder at worst  4) Pt will demonstrate increased MMT to 3+/5 grossly in B shoulders        Goals to be met by discharge:  1) Independent with HEP  2) Pt will demonstrate (L/R) shoulder AROM WFL grossly for [place] with ADL's  3) Pt will demonstrate (L/R) shoulder MMT WFL grossly for [place] with functional activities  4) Independent and pain free with ADL's and IADL's  5) Patient will be able to achieve FOTO score less than or equal to 46% limitation with UE function.     BASHIR Stewart

## 2017-05-25 ENCOUNTER — CLINICAL SUPPORT (OUTPATIENT)
Dept: REHABILITATION | Facility: HOSPITAL | Age: 62
End: 2017-05-25
Attending: INTERNAL MEDICINE
Payer: MEDICAID

## 2017-05-25 DIAGNOSIS — M25.612 DECREASED ROM OF LEFT SHOULDER: ICD-10-CM

## 2017-05-25 DIAGNOSIS — M25.512 LEFT SHOULDER PAIN, UNSPECIFIED CHRONICITY: ICD-10-CM

## 2017-05-25 PROCEDURE — 97530 THERAPEUTIC ACTIVITIES: CPT

## 2017-05-25 NOTE — PROGRESS NOTES
"TIME RECORD    Date:  05/25/2017    Start Time:  9:10  Stop Time:  9:55  Visit: 8/17    PROCEDURES:    TIMED  Procedure Min.   TA 45 (30 min 1:1, 15 min supervised)                     UNTIMED  Procedure Min.   CP    MHP      Total Timed Minutes:  45  Total Timed Units:  3  Total Untimed Units:  0  Charges Billed/# of units:  2TA      Progress/Current Status    Subjective:     Patient ID: Christina Tomlinson is a 61 y.o. female.  Diagnosis:   1. Left shoulder pain, unspecified chronicity     2. Decreased ROM of left shoulder       Pain: 0/10 at rest  Pt reported "I am feeling pretty good. I think I've been getting better. I don't have as much pain as I used to." Pt reports compliance with HEP.     Objective:     Range of Motion:   Shoulder Right Left Pain/Dysfunction with Movement     AROM AROM     flexion 100 (+10) 110 (+5) Pain with attempted MMT   extension 50 (=) 48 (+13)     abduction 100 (+15) 100 (+15) Pain at end range with L   adduction 15 (+15) 10 (+5)     Internal rotation 70 (+30) 75 (+45)    ER at 90° abd 72 (+17) 60 (+15)    ER at 0° abd 70 (+5) 35 (+5)       FOTO score: 38% limitation with UE function (35% improvement since eval)      Pt seen by OT this session. Treatment consisted of the following:     Date: 5/25/17    Visit: 8   Pulleys 3 min FF   Scapular squeezes with ER 10 reps, against wall with towel roll   Wall slides 10 reps, as tolerated   PROM- 10 reps, all planes   Dowel - FF 2 x 10 reps, 2#   Serratus punches- 10 reps B UE   SL ER/ABD- 2 x 10 reps, B UE, 1#    tband- red    -rows 3 x 10 reps   -ext 3 x 10 reps   -IR/ER 2 x 10 reps each   -biceps curls 2 x 10 reps    UBE - L5 2'30" forward, 2'30" back   scap stabilization with ball on wall, shoulder @90 10 reps CW/CCW,  B UE     Patient declined cold pack       Assessment:     Pt tolerated treatment fairly well today without c/o increased pain.  Pt demonstrates improved B shoulder ROM and pain.  FOTO score significantly improved. Tightness " still noted at end ROM in R shoulder with PROM with FF and ABD.  Pt reports compliance with HEP.  Pt cont to present with occasional min pain in L/R shoulder, weakness, and decreased scapular strength.  Pt with good participation. Pt progressing fairly well towards goals.  Pt would cont to benefit from skilled OT services to maximize functional use of B UE.    Patient Education/Response:     Cont HEP.     Plans and Goals:     Cont OT poc 2x/week for 8 weeks during certification period 4/4/17 to 6/4/17 in pursuit of established goals.     Goals to be met in 4 weeks: (5/4/17)  1) Initiate Hep ---met  2) Pt will increase B shoulder AROM by 10 degrees grossly for improved performance with overhead ADL's---met  3) Pt will report 3/10 pain in (L)shoulder at worst---met  4) Pt will demonstrate increased MMT to 3+/5 grossly in B shoulders---not fully met        Goals to be met by discharge:  1) Independent with HEP  2) Pt will demonstrate (L/R) shoulder AROM WFL grossly for [place] with ADL's  3) Pt will demonstrate (L/R) shoulder MMT WFL grossly for [place] with functional activities  4) Independent and pain free with ADL's and IADL's  5) Patient will be able to achieve FOTO score less than or equal to 46% limitation with UE function.     BASHIR Stewart

## 2017-05-30 ENCOUNTER — CLINICAL SUPPORT (OUTPATIENT)
Dept: REHABILITATION | Facility: HOSPITAL | Age: 62
End: 2017-05-30
Attending: INTERNAL MEDICINE
Payer: MEDICAID

## 2017-05-30 DIAGNOSIS — M25.512 LEFT SHOULDER PAIN, UNSPECIFIED CHRONICITY: ICD-10-CM

## 2017-05-30 DIAGNOSIS — M25.612 DECREASED ROM OF LEFT SHOULDER: ICD-10-CM

## 2017-05-30 PROCEDURE — 97530 THERAPEUTIC ACTIVITIES: CPT

## 2017-05-30 NOTE — PROGRESS NOTES
"TIME RECORD    Date:  05/30/2017    Start Time:  8:05  Stop Time:  9:00  Visit: 9/17    PROCEDURES:    TIMED  Procedure Min.   TA 55 (45 min 1:1, 10 min supervised)                     UNTIMED  Procedure Min.   CP    MHP      Total Timed Minutes:  55  Total Timed Units:  4  Total Untimed Units:  0  Charges Billed/# of units:  3TA      Progress/Current Status    Subjective:     Patient ID: Christina Tomlinson is a 61 y.o. female.  Diagnosis:   1. Left shoulder pain, unspecified chronicity     2. Decreased ROM of left shoulder       Pain: 0/10 at rest, but pt c/o  Pain in wrists and hands  Pt reported "I feel ok. My hand is hurting." Pt reports compliance with HEP.     Objective:     Range of Motion:   Shoulder Right Left Pain/Dysfunction with Movement     AROM AROM     flexion 100 (+10) 110 (+5) Pain with attempted MMT   extension 50 (=) 48 (+13)     abduction 100 (+15) 100 (+15) Pain at end range with L   adduction 15 (+15) 10 (+5)     Internal rotation 70 (+30) 75 (+45)    ER at 90° abd 72 (+17) 60 (+15)    ER at 0° abd 70 (+5) 35 (+5)       FOTO score: 38% limitation with UE function (35% improvement since eval)      Pt seen by OT this session. Treatment consisted of the following:     Date: 5/29/17    Visit: 9   Pulleys 3 min FF   Scapular squeezes with ER 10 reps, against wall with towel roll   Wall slides 10 reps, as tolerated   PROM- 10 reps, all planes   Dowel - FF 2 x 10 reps, 2#   Serratus punches- 10 reps B UE   SL ER/ABD- 2 x 10 reps, B UE, 1#    tband- red    -rows 3 x 10 reps   -ext 3 x 10 reps   -IR/ER 2 x 10 reps each   -biceps curls 2 x 10 reps    UBE - L5 2' forward, 2' back   scap stabilization with ball on wall, shoulder @90 10 reps CW/CCW,  B UE (not today)     Patient declined cold pack       Assessment:     Pt tolerated treatment fairly well today without c/o increased pain.  Tightness still noted at end ROM in R shoulder with PROM with FF and ABD.  Pt reports compliance with HEP.  Pt reported B " hand pain due to arthritis. Educated pt on joint protection techniques and education and on home paraffin bath units. Pt cont to present with occasional min pain in L/R shoulder, weakness, and decreased scapular strength.  Pt with good participation. Pt progressing fairly well towards goals.  Pt would cont to benefit from skilled OT services to maximize functional use of B UE.    Patient Education/Response:     Cont HEP.     Plans and Goals:     Cont OT poc 2x/week for 8 weeks during certification period 4/4/17 to 6/4/17 in pursuit of established goals.     Goals to be met in 4 weeks: (5/4/17)  1) Initiate Hep ---met  2) Pt will increase B shoulder AROM by 10 degrees grossly for improved performance with overhead ADL's---met  3) Pt will report 3/10 pain in (L)shoulder at worst---met  4) Pt will demonstrate increased MMT to 3+/5 grossly in B shoulders---not fully met        Goals to be met by discharge:  1) Independent with HEP  2) Pt will demonstrate (L/R) shoulder AROM WFL grossly for [place] with ADL's  3) Pt will demonstrate (L/R) shoulder MMT WFL grossly for [place] with functional activities  4) Independent and pain free with ADL's and IADL's  5) Patient will be able to achieve FOTO score less than or equal to 46% limitation with UE function.     BASHIR Stewart

## 2017-06-01 ENCOUNTER — CLINICAL SUPPORT (OUTPATIENT)
Dept: REHABILITATION | Facility: HOSPITAL | Age: 62
End: 2017-06-01
Attending: INTERNAL MEDICINE
Payer: MEDICAID

## 2017-06-01 DIAGNOSIS — M25.612 DECREASED ROM OF LEFT SHOULDER: ICD-10-CM

## 2017-06-01 DIAGNOSIS — M25.512 LEFT SHOULDER PAIN, UNSPECIFIED CHRONICITY: ICD-10-CM

## 2017-06-01 PROCEDURE — 97530 THERAPEUTIC ACTIVITIES: CPT

## 2017-06-01 NOTE — PLAN OF CARE
OCCUPATIONAL THERAPY UPDATED PLAN OF TREATMENT    Patient name: Christina Tomlinson  Onset Date:  ~8 mo ago  SOC Date:  4/4/17  Primary Diagnosis:  L RTC tear  1. Left shoulder pain, unspecified chronicity     2. Decreased ROM of left shoulder       Treatment Diagnosis:  B shoulder pain, UE weakness  Certification Period:  4/4/17 to 6/4/17  Precautions:  standard  Visits from SOC:  10  Functional Level Prior to SOC: independent/mod I    Updated Assessment:  Pt cont to progress well towards goals. Pt demonstrates improved AROM and pain in B shoulders. R more limited than L shoulder.  Pt cont to report occasional pain in B shoulders at end range and with activities such as reaching overhead or mopping/sweeping. Pt would cont to bbenefit from OT services to improve deficits. Pt has 6 more visits authorized by insurance.     Previous Short Term Goals Status:     Goals to be met in 4 weeks: (5/4/17)  1) Initiate Hep ---met  2) Pt will increase B shoulder AROM by 10 degrees grossly for improved performance with overhead ADL's---met, cont with R  3) Pt will report 3/10 pain in (L)shoulder at worst---met  4) Pt will demonstrate increased MMT to 3+/5 grossly in B shoulders---not fully met        Goals to be met by discharge:  1) Independent with HEP---met  2) Pt will demonstrate (L/R) shoulder AROM WFL grossly for [place] with ADL's---not fully met  3) Pt will demonstrate (L/R) shoulder MMT WFL grossly for [place] with functional activities---not met  4) Independent and pain free with ADL's and IADL's---not consistently met  5) Patient will be able to achieve FOTO score less than or equal to 46% limitation with UE function. ---not met  New Short Term Goals Status:     Cont #2 for R shoulder and #4  Long Term Goal Status:   continue per initial plan of care.  Reasons for Recertification of Therapy:   Pt progressing fairly well. She cont to experience pain with reaching overhead and with mopping and sweeping. Pt with  good participation and compliance.    Certification Period: 6/1/17 to 7/1/17  Recommended Treatment Plan: 2 times per week for 3-4 weeks: Electrical Stimulation , Manual Therapy, Moist Heat/ Ice, Neuromuscular Re-ed, Paraffin, Patient Education, Self Care, Therapeutic Activites, Therapeutic Exercise and Ultrasound/Phonophoresis  Other Recommendations: ALAN, EBONIE as needed        Therapist's Name: BASHIR Stewart   Date: 06/01/2017    I CERTIFY THE NEED FOR THESE SERVICES FURNISHED UNDER THIS PLAN OF TREATMENT AND WHILE UNDER MY CARE    Physician's comments: ________________________________________________________________________________________________________________________________________________      Physician's Name: ___________________________________    I certify the need for these services furnished under this plan of treatment and while under my care.  Everette Aguirre MD

## 2017-06-01 NOTE — PROGRESS NOTES
"TIME RECORD    Date:  06/01/2017    Start Time:  8:00  Stop Time:  9:00  Visit: 11/17    PROCEDURES:    TIMED  Procedure Min.   TA 60                     UNTIMED  Procedure Min.   CP    MHP      Total Timed Minutes:  60  Total Timed Units:  4  Total Untimed Units:  0  Charges Billed/# of units:  4TA      Progress/Current Status    Subjective:     Patient ID: Christina Tomlinson is a 61 y.o. female.  Diagnosis:   1. Left shoulder pain, unspecified chronicity     2. Decreased ROM of left shoulder       Pain: pt reported mild B shoulder pain but did not rate  Pt reported "I feel ok. My shoulders hurt a little bit today." Pt reports compliance with HEP.     Objective:     Range of Motion: (improvement since last reassessment)  Shoulder Right Left Pain/Dysfunction with Movement     AROM AROM     flexion 110 (+10) 132 (+22) Pain at end range   extension 50 (=) 48 (=)     abduction 90 (-10) 110 (+10) Pain at end range with L   adduction 10 (+-5) 10 (=)     Internal rotation 45 (-25) 60 (-15)    ER at 90° abd 85 (+13) 70 (+10)    ER at 0° abd 65 (-5) 50 (+15)       **pt c/o mild R shoulder pain today, which may have limited ROM in R today    Pt seen by OT this session. Treatment consisted of the following:     Date: 6/1/17    Visit: 11   Pulleys 3 min FF   Scapular squeezes with ER 10 reps, against wall with towel roll   Wall slides 10 reps, as tolerated   PROM- 10 reps, all planes   Dowel - FF 2 x 10 reps, 2#   Serratus punches- 10 reps B UE   SL ER/ABD- 2 x 10 reps, B UE, 1#    SL gator 10 reps, B UE   tband- red    -rows 3 x 10 reps   -ext 3 x 10 reps   -IR/ER 2 x 10 reps each   -biceps curls 2 x 10 reps    UBE - L5 2'30" forward, 2'30" back   scap stabilization with ball on wall, shoulder @90 10 reps CW/CCW,  B UE      Patient declined cold pack       Assessment:     Pt tolerated treatment fairly well today without c/o increased pain.  Tightness still noted at end ROM in R shoulder with PROM with FF and ABD.  Much " improved AROM of L shoulder with occasional mild pain. Some improvement noted with R shoulder, but c/o pain today.  Pt reports compliance with HEP. Pt cont to present with occasional min pain in L/R shoulder, weakness, and decreased scapular strength. Pt reports pain with reaching overhead and mopping and sweeping.  Pt with good participation. Pt progressing fairly well towards goals.  Pt has 6 more authorized visits by insurance. Pt would like to cont therapy; she feels it is benefiting her.   Pt would cont to benefit from skilled OT services to maximize functional use of B UE.    Patient Education/Response:     Cont HEP.     Plans and Goals:     Cont OT poc 2x/week for 3-4 weeks during certification period 6/1/17 to 7/1/17 in pursuit of established goals.     Goals to be met in 4 weeks: (5/4/17)  1) Initiate Hep ---met  2) Pt will increase B shoulder AROM by 10 degrees grossly for improved performance with overhead ADL's---met, cont with R  3) Pt will report 3/10 pain in (L)shoulder at worst---met  4) Pt will demonstrate increased MMT to 3+/5 grossly in B shoulders---not fully met        Goals to be met by discharge:  1) Independent with HEP  2) Pt will demonstrate (L/R) shoulder AROM WFL grossly for [place] with ADL's  3) Pt will demonstrate (L/R) shoulder MMT WFL grossly for [place] with functional activities  4) Independent and pain free with ADL's and IADL's  5) Patient will be able to achieve FOTO score less than or equal to 46% limitation with UE function.     BASHIR Stewart

## 2017-06-08 ENCOUNTER — CLINICAL SUPPORT (OUTPATIENT)
Dept: REHABILITATION | Facility: HOSPITAL | Age: 62
End: 2017-06-08
Attending: INTERNAL MEDICINE
Payer: MEDICAID

## 2017-06-08 DIAGNOSIS — M25.512 LEFT SHOULDER PAIN, UNSPECIFIED CHRONICITY: ICD-10-CM

## 2017-06-08 DIAGNOSIS — M25.612 DECREASED ROM OF LEFT SHOULDER: ICD-10-CM

## 2017-06-08 PROCEDURE — 97530 THERAPEUTIC ACTIVITIES: CPT

## 2017-06-08 NOTE — PROGRESS NOTES
"TIME RECORD    Date:  06/08/2017    Start Time:  8:05  Stop Time:  9:00  Visit: 12/17    PROCEDURES:    TIMED  Procedure Min.   TA 55 (15 min 1:1; 40 min supervised)                     UNTIMED  Procedure Min.   CP    MHP      Total Timed Minutes:  55  Total Timed Units:  4  Total Untimed Units:  0  Charges Billed/# of units:  1TA      Progress/Current Status    Subjective:     Patient ID: Christina Tomlinson is a 61 y.o. female.  Diagnosis:   1. Left shoulder pain, unspecified chronicity     2. Decreased ROM of left shoulder       Pain: pt reported mild B shoulder pain at end range but did not rate  Pt reported "I feel ok. My shoulder feels fine." Pt reports compliance with HEP.     Objective:     Range of Motion: (improvement since last reassessment)  Shoulder Right Left Pain/Dysfunction with Movement     AROM AROM     flexion 110 (+10) 132 (+22) Pain at end range   extension 50 (=) 48 (=)     abduction 90 (-10) 110 (+10) Pain at end range with L   adduction 10 (+-5) 10 (=)     Internal rotation 45 (-25) 60 (-15)    ER at 90° abd 85 (+13) 70 (+10)    ER at 0° abd 65 (-5) 50 (+15)       **pt c/o mild R shoulder pain today, which may have limited ROM in R today    Pt seen by OT this session. Treatment consisted of the following:     Date: 6/8/17    Visit: 12   Pulleys 3 min FF   Scapular squeezes with ER 10 reps, against wall with towel roll   Wall slides 10 reps, as tolerated   PROM- 10 reps, all planes   Dowel - FF 2 x 10 reps, 2#   Serratus punches- 10 reps B UE   SL ER/ABD- 2 x 10 reps, B UE, 1#    SL gator 10 reps, B UE   tband- red    -rows 3 x 10 reps   -ext 3 x 10 reps   -IR/ER 2 x 10 reps each   -biceps curls 2 x 10 reps    UBE - L5 2'30" forward, 2'30" back   scap stabilization with ball on wall, shoulder @90 10 reps CW/CCW,  B UE (not today)     Patient declined cold pack       Assessment:     Pt tolerated treatment fairly well today without c/o increased pain.  Tightness still noted at end ROM in R " shoulder with PROM with FF and ABD.  Pt with cont c/o pain at end range in R shoulder.  Pt reports compliance with HEP. Pt cont to present with occasional min pain in L/R shoulder, weakness, and decreased scapular strength. Pt reports pain with reaching overhead and mopping and sweeping.  Pt with good participation. Pt progressing fairly well towards goals.    Pt would cont to benefit from skilled OT services to maximize functional use of B UE.    Patient Education/Response:     Cont HEP.     Plans and Goals:     Cont OT poc 2x/week for 3-4 weeks during certification period 6/1/17 to 7/1/17 in pursuit of established goals.     Goals to be met in 4 weeks: (5/4/17)  1) Initiate Hep ---met  2) Pt will increase B shoulder AROM by 10 degrees grossly for improved performance with overhead ADL's---met, cont with R  3) Pt will report 3/10 pain in (L)shoulder at worst---met  4) Pt will demonstrate increased MMT to 3+/5 grossly in B shoulders---not fully met        Goals to be met by discharge:  1) Independent with HEP  2) Pt will demonstrate (L/R) shoulder AROM WFL grossly for [place] with ADL's  3) Pt will demonstrate (L/R) shoulder MMT WFL grossly for [place] with functional activities  4) Independent and pain free with ADL's and IADL's  5) Patient will be able to achieve FOTO score less than or equal to 46% limitation with UE function.     BASHIR Stewart

## 2017-06-13 ENCOUNTER — CLINICAL SUPPORT (OUTPATIENT)
Dept: REHABILITATION | Facility: HOSPITAL | Age: 62
End: 2017-06-13
Attending: INTERNAL MEDICINE
Payer: MEDICAID

## 2017-06-13 DIAGNOSIS — M25.612 DECREASED ROM OF LEFT SHOULDER: ICD-10-CM

## 2017-06-13 DIAGNOSIS — M25.512 LEFT SHOULDER PAIN, UNSPECIFIED CHRONICITY: ICD-10-CM

## 2017-06-13 PROCEDURE — 97530 THERAPEUTIC ACTIVITIES: CPT

## 2017-06-13 NOTE — PROGRESS NOTES
"TIME RECORD    Date:  06/13/2017    Start Time:  10:00  Stop Time:  10:50  Visit: 13/17    PROCEDURES:    TIMED  Procedure Min.   TA 50 (30 min 1:1; 20 min supervised)                     UNTIMED  Procedure Min.   CP    MHP      Total Timed Minutes:  50  Total Timed Units:  3  Total Untimed Units:  0  Charges Billed/# of units:  2TA      Progress/Current Status    Subjective:     Patient ID: Christina Tomlinson is a 61 y.o. female.  Diagnosis:   1. Left shoulder pain, unspecified chronicity     2. Decreased ROM of left shoulder       Pain: pt reported mild B shoulder pain at end range but did not rate  Pt reported "my shoulders don't feel too bad today." Pt reports compliance with HEP.     Objective:     Range of Motion: (improvement since last reassessment)  Shoulder Right Left Pain/Dysfunction with Movement     AROM AROM     flexion 110 (+10) 132 (+22) Pain at end range   extension 50 (=) 48 (=)     abduction 90 (-10) 110 (+10) Pain at end range with L   adduction 10 (+-5) 10 (=)     Internal rotation 45 (-25) 60 (-15)    ER at 90° abd 85 (+13) 70 (+10)    ER at 0° abd 65 (-5) 50 (+15)       **pt c/o mild R shoulder pain today, which may have limited ROM in R today    Pt seen by OT this session. Treatment consisted of the following:     Date: 6/13/17    Visit: 13   Pulleys 3 min FF   Scapular squeezes with ER 10 reps, against wall with towel roll   Wall slides 10 reps, as tolerated   PROM- 10 reps, all planes   Dowel - FF 2 x 10 reps, 2#   Serratus punches- 10 reps B UE   SL ER/ABD- 2 x 10 reps, B UE, 1#    SL gator 10 reps, B UE   tband- red    -rows 2 x 15 reps   -ext 2 x 15 reps   -IR/ER 2 x 15 reps each   -biceps curls 2 x 15 reps    UBE - L5 2'30" forward, 2'30" back   scap stabilization with ball on wall, shoulder @90 10 reps CW/CCW,  B UE (not today)          Assessment:     Pt tolerated treatment fairly well today without c/o increased pain.  Tightness still noted at end ROM in R shoulder with PROM with FF " and ABD.  Pt with cont c/o pain at end range in R shoulder with FF and ABD.  Pt reports compliance with HEP. Pt cont to present with occasional min pain in L/R shoulder, weakness, and decreased scapular strength. Pt reports pain with reaching overhead and mopping and sweeping. Pt tolerated progression of ex well without c/o pain.  Pt with good participation. Pt progressing fairly well towards goals.    Pt would cont to benefit from skilled OT services to maximize functional use of B UE.    Patient Education/Response:     Cont HEP.     Plans and Goals:     Cont OT poc 2x/week for 3-4 weeks during certification period 6/1/17 to 7/1/17 in pursuit of established goals.     Goals to be met in 4 weeks: (5/4/17)  1) Initiate Hep ---met  2) Pt will increase B shoulder AROM by 10 degrees grossly for improved performance with overhead ADL's---met, cont with R  3) Pt will report 3/10 pain in (L)shoulder at worst---met  4) Pt will demonstrate increased MMT to 3+/5 grossly in B shoulders---not fully met        Goals to be met by discharge:  1) Independent with HEP  2) Pt will demonstrate (L/R) shoulder AROM WFL grossly for [place] with ADL's  3) Pt will demonstrate (L/R) shoulder MMT WFL grossly for [place] with functional activities  4) Independent and pain free with ADL's and IADL's  5) Patient will be able to achieve FOTO score less than or equal to 46% limitation with UE function.     BASHIR Stewart

## 2017-06-14 ENCOUNTER — CLINICAL SUPPORT (OUTPATIENT)
Dept: REHABILITATION | Facility: HOSPITAL | Age: 62
End: 2017-06-14
Attending: INTERNAL MEDICINE
Payer: MEDICAID

## 2017-06-14 DIAGNOSIS — M25.512 LEFT SHOULDER PAIN, UNSPECIFIED CHRONICITY: ICD-10-CM

## 2017-06-14 DIAGNOSIS — M25.612 DECREASED ROM OF LEFT SHOULDER: ICD-10-CM

## 2017-06-14 PROCEDURE — 97530 THERAPEUTIC ACTIVITIES: CPT

## 2017-06-14 NOTE — PROGRESS NOTES
"TIME RECORD    Date:  06/14/2017    Start Time:  1:00  Stop Time:  1:50  Visit: 14/17    PROCEDURES:    TIMED  Procedure Min.   TA 50                      UNTIMED  Procedure Min.   CP    MHP      Total Timed Minutes:  50  Total Timed Units:  3  Total Untimed Units:  0  Charges Billed/# of units:  3TA      Progress/Current Status    Subjective:     Patient ID: Christina Tomlinson is a 61 y.o. female.  Diagnosis:   1. Left shoulder pain, unspecified chronicity     2. Decreased ROM of left shoulder       Pain: 0/10 pre session pain  Pt reported "my shoulders don't feel bad today." Pt reports compliance with HEP.     Objective:     Range of Motion: (improvement since last reassessment)  Shoulder Right Left Pain/Dysfunction with Movement     AROM AROM     flexion 110 (+10) 132 (+22) Pain at end range   extension 50 (=) 48 (=)     abduction 90 (-10) 110 (+10) Pain at end range with L   adduction 10 (+-5) 10 (=)     Internal rotation 45 (-25) 60 (-15)    ER at 90° abd 85 (+13) 70 (+10)    ER at 0° abd 65 (-5) 50 (+15)           Pt seen by OT this session. Treatment consisted of the following:     Date: 6/14/17    Visit: 14   Pulleys 3 min FF/ABD   Scapular squeezes with ER 10 reps, against wall with towel roll   Wall slides 10 reps, as tolerated   PROM- 10 reps, all planes   Dowel - FF 2 x 10 reps, 2#   Serratus punches- 10 reps B UE   SL ER/ABD- 2 x 10 reps, B UE, 1#    SL gator 10 reps, B UE   tband- red    -rows 2 x 15 reps   -ext 2 x 15 reps   -IR/ER 2 x 15 reps each   -biceps curls 2 x 15 reps    UBE - L5 2'30" forward, 2'30" back   scap stabilization with ball on wall, shoulder @90 10 reps CW/CCW,  B UE          Assessment:     Pt tolerated treatment fairly well today without c/o increased pain.  Tightness still noted at end ROM in R shoulder with PROM with FF and ABD.  Pt with cont c/o pain at end range in R shoulder with FF and ABD.  Pt reports compliance with HEP. Pt cont to present with occasional min pain in L/R " shoulder, weakness, and decreased scapular strength. Pt reports pain with reaching overhead and mopping and sweeping. Pt tolerated progression of ex well without c/o pain.  Pt with good participation. Pt progressing fairly well towards goals.    Pt would cont to benefit from skilled OT services to maximize functional use of B UE.    Patient Education/Response:     Cont HEP.     Plans and Goals:     Cont OT poc 2x/week for 3-4 weeks during certification period 6/1/17 to 7/1/17 in pursuit of established goals.     Goals to be met in 4 weeks: (5/4/17)  1) Initiate Hep ---met  2) Pt will increase B shoulder AROM by 10 degrees grossly for improved performance with overhead ADL's---met, cont with R  3) Pt will report 3/10 pain in (L)shoulder at worst---met  4) Pt will demonstrate increased MMT to 3+/5 grossly in B shoulders---not fully met        Goals to be met by discharge:  1) Independent with HEP  2) Pt will demonstrate (L/R) shoulder AROM WFL grossly for [place] with ADL's  3) Pt will demonstrate (L/R) shoulder MMT WFL grossly for [place] with functional activities  4) Independent and pain free with ADL's and IADL's  5) Patient will be able to achieve FOTO score less than or equal to 46% limitation with UE function.     BASHIR Stewart

## 2017-06-18 RX ORDER — METOPROLOL SUCCINATE 50 MG/1
TABLET, EXTENDED RELEASE ORAL
Qty: 90 TABLET | Refills: 3 | Status: SHIPPED | OUTPATIENT
Start: 2017-06-18 | End: 2018-06-22 | Stop reason: SDUPTHER

## 2017-06-19 RX ORDER — ATORVASTATIN CALCIUM 40 MG/1
TABLET, FILM COATED ORAL
Qty: 90 TABLET | Refills: 0 | Status: SHIPPED | OUTPATIENT
Start: 2017-06-19 | End: 2017-07-24 | Stop reason: SDUPTHER

## 2017-06-20 ENCOUNTER — CLINICAL SUPPORT (OUTPATIENT)
Dept: REHABILITATION | Facility: HOSPITAL | Age: 62
End: 2017-06-20
Attending: INTERNAL MEDICINE
Payer: MEDICAID

## 2017-06-20 DIAGNOSIS — M25.612 DECREASED ROM OF LEFT SHOULDER: ICD-10-CM

## 2017-06-20 DIAGNOSIS — M25.512 LEFT SHOULDER PAIN, UNSPECIFIED CHRONICITY: ICD-10-CM

## 2017-06-20 PROCEDURE — 97530 THERAPEUTIC ACTIVITIES: CPT

## 2017-06-20 NOTE — PROGRESS NOTES
"TIME RECORD    Date:  06/20/2017    Start Time:  8:00  Stop Time:  8:45  Visit: 15/17    PROCEDURES:    TIMED  Procedure Min.   TA 35                     UNTIMED  Procedure Min.   CP    MHP 10     Total Timed Minutes:  35  Total Timed Units:  2  Total Untimed Units:  0  Charges Billed/# of units:  2TA      Progress/Current Status    Subjective:     Patient ID: Christina Tomlinson is a 61 y.o. female.  Diagnosis:   1. Left shoulder pain, unspecified chronicity     2. Decreased ROM of left shoulder       Pain: pt did not rate, reported decreased pain after MHP  Pt reported "I'm hurting a little today. I think it is the weather." Pt reports compliance with HEP.     Objective:     Range of Motion: (improvement since last reassessment)  Shoulder Right Left Pain/Dysfunction with Movement     AROM AROM     flexion 110 (+10) 132 (+22) Pain at end range   extension 50 (=) 48 (=)     abduction 90 (-10) 110 (+10) Pain at end range with L   adduction 10 (+-5) 10 (=)     Internal rotation 45 (-25) 60 (-15)    ER at 90° abd 85 (+13) 70 (+10)    ER at 0° abd 65 (-5) 50 (+15)           Pt seen by OT this session. Treatment consisted of the following:     Date: 6/20/17    Visit: 15   Pulleys 3 min FF/ABD   Scapular squeezes with ER 10 reps, against wall with towel roll (deferred today due to pain)   Wall slides 10 reps, as tolerated   PROM- 10 reps, all planes   Dowel - FF 2 x 10 reps, 2#   Serratus punches- 10 reps B UE   SL ER/ABD- 2 x 10 reps, B UE, 1#    SL gator 10 reps, B UE   tband- red    -rows 2 x 15 reps   -ext 2 x 15 reps   -IR/ER 2 x 15 reps each (deferred due to pain)   -biceps curls 2 x 15 reps (deferred due to pain)   UBE - L5 2'30" forward, 2'30" back   scap stabilization with ball on wall, shoulder @90 10 reps CW/CCW,  B UE (deferred due to pain)      Patient received MH x 10 min to B shoulders to increase blood flow, circulation and tissue elasticity after therex      Assessment:     Pt tolerated treatment fairly " today.  Pt with c/o pain in B shoulders and B wrists today, which was limiting in treatment. Some ex deferred today due to pain. Pt reports it is possibly due to arthritis and the weather.Tightness still noted at end ROM in R shoulder with PROM with FF and ABD.  Pt with cont c/o pain at end range in R shoulder with FF and ABD.  Pt reports compliance with HEP. Pt cont to present with min pain in L/R shoulder, weakness, and decreased scapular strength. Pt reports pain with reaching overhead and mopping and sweeping.  Pt with good participation. Pt progressing fairly well towards goals.    Pt would cont to benefit from skilled OT services to maximize functional use of B UE.    Patient Education/Response:     Cont HEP.     Plans and Goals:     Cont OT poc 2x/week for 3-4 weeks during certification period 6/1/17 to 7/1/17 in pursuit of established goals.     Goals to be met in 4 weeks: (5/4/17)  1) Initiate Hep ---met  2) Pt will increase B shoulder AROM by 10 degrees grossly for improved performance with overhead ADL's---met, cont with R  3) Pt will report 3/10 pain in (L)shoulder at worst---met  4) Pt will demonstrate increased MMT to 3+/5 grossly in B shoulders---not fully met        Goals to be met by discharge:  1) Independent with HEP  2) Pt will demonstrate (L/R) shoulder AROM WFL grossly for [place] with ADL's  3) Pt will demonstrate (L/R) shoulder MMT WFL grossly for [place] with functional activities  4) Independent and pain free with ADL's and IADL's  5) Patient will be able to achieve FOTO score less than or equal to 46% limitation with UE function.     BASHIR Stewart

## 2017-06-22 ENCOUNTER — CLINICAL SUPPORT (OUTPATIENT)
Dept: REHABILITATION | Facility: HOSPITAL | Age: 62
End: 2017-06-22
Attending: INTERNAL MEDICINE
Payer: MEDICAID

## 2017-06-22 DIAGNOSIS — M25.512 LEFT SHOULDER PAIN, UNSPECIFIED CHRONICITY: ICD-10-CM

## 2017-06-22 DIAGNOSIS — M25.612 DECREASED ROM OF LEFT SHOULDER: ICD-10-CM

## 2017-06-22 PROCEDURE — 97530 THERAPEUTIC ACTIVITIES: CPT

## 2017-06-22 NOTE — PROGRESS NOTES
"TIME RECORD    Date:  06/22/2017    Start Time:  8:05  Stop Time:  9:00  Visit: 16/17    PROCEDURES:    TIMED  Procedure Min.   TA 55 (30 min 1:1, 25 min supervised)                     UNTIMED  Procedure Min.   CP    MHP      Total Timed Minutes:  55  Total Timed Units:  4  Total Untimed Units:  0  Charges Billed/# of units:  2TA      Progress/Current Status    Subjective:     Patient ID: Christina Tomlinson is a 61 y.o. female.  Diagnosis:   1. Left shoulder pain, unspecified chronicity     2. Decreased ROM of left shoulder       Pain: pt did not rate, but reported decreased pain today  Pt reported "I'm still kind of hurting, but it feels better today." Pt reports compliance with HEP.     Objective:     Range of Motion: (improvement since last reassessment)  Shoulder Right Left Pain/Dysfunction with Movement     AROM AROM     flexion 110 (+10) 132 (+22) Pain at end range   extension 50 (=) 48 (=)     abduction 90 (-10) 110 (+10) Pain at end range with L   adduction 10 (+-5) 10 (=)     Internal rotation 45 (-25) 60 (-15)    ER at 90° abd 85 (+13) 70 (+10)    ER at 0° abd 65 (-5) 50 (+15)           Pt seen by OT this session. Treatment consisted of the following:     Date: 6/22/17    Visit: 16   Pulleys 3 min FF/ABD   Scapular squeezes with ER 10 reps, against wall with towel roll    Wall slides 10 reps, as tolerated   PROM- 10 reps, all planes   Dowel - FF 2 x 10 reps, 2#   Serratus punches- 10 reps B UE (not today)   SL ER/ABD- 2 x 10 reps, B UE, 1#    SL gator 10 reps, B UE   tband- red    -rows 2 x 15 reps   -ext 2 x 15 reps   -IR/ER 2 x 15 reps each    -biceps curls 2 x 15 reps    UBE - L5 2'30" forward, 2'30" back   scap stabilization with ball on wall, shoulder @90 10 reps CW/CCW,  B UE (deferred due to pain)          Assessment:     Pt tolerated treatment fairly today.  Pt reported improved pain today.  Pt does report some aching in multiple body joints. Pt reports it is possibly due to arthritis and the " weather.Tightness still noted at end ROM in R shoulder with PROM with FF and ABD.  Pt with cont c/o pain at end range in R shoulder with FF and ABD.  Pt reports compliance with HEP. Pt cont to present with min pain in L/R shoulder, weakness, and decreased scapular strength. Pt reports pain with reaching overhead and mopping and sweeping.  Pt with good participation. Pt progressing fairly well towards goals. Pain cont to be somewhat limiting with progress. Pt would cont to benefit from skilled OT services to maximize functional use of B UE.    Patient Education/Response:     Cont HEP.     Plans and Goals:     Cont OT poc 2x/week for 3-4 weeks during certification period 6/1/17 to 7/1/17 in pursuit of established goals. Reassess and FOTO next visit.    Goals to be met in 4 weeks: (5/4/17)  1) Initiate Hep ---met  2) Pt will increase B shoulder AROM by 10 degrees grossly for improved performance with overhead ADL's---met, cont with R  3) Pt will report 3/10 pain in (L)shoulder at worst---met  4) Pt will demonstrate increased MMT to 3+/5 grossly in B shoulders---not fully met        Goals to be met by discharge:  1) Independent with HEP  2) Pt will demonstrate (L/R) shoulder AROM WFL grossly for [place] with ADL's  3) Pt will demonstrate (L/R) shoulder MMT WFL grossly for [place] with functional activities  4) Independent and pain free with ADL's and IADL's  5) Patient will be able to achieve FOTO score less than or equal to 46% limitation with UE function.     BASHIR Stewart

## 2017-06-27 ENCOUNTER — CLINICAL SUPPORT (OUTPATIENT)
Dept: REHABILITATION | Facility: HOSPITAL | Age: 62
End: 2017-06-27
Attending: INTERNAL MEDICINE
Payer: MEDICAID

## 2017-06-27 DIAGNOSIS — M25.612 DECREASED ROM OF LEFT SHOULDER: ICD-10-CM

## 2017-06-27 DIAGNOSIS — M25.512 LEFT SHOULDER PAIN, UNSPECIFIED CHRONICITY: ICD-10-CM

## 2017-06-27 PROCEDURE — 97530 THERAPEUTIC ACTIVITIES: CPT

## 2017-06-27 NOTE — PROGRESS NOTES
"TIME RECORD    Date:  06/27/2017    Start Time:  8:10  Stop Time:  9:00  Visit: 17/17    PROCEDURES:    TIMED  Procedure Min.   TA 50 (30 min 1:1, 20 min supervised)                     UNTIMED  Procedure Min.   CP    MHP      Total Timed Minutes:  50  Total Timed Units:  3  Total Untimed Units:  0  Charges Billed/# of units:  2TA      Progress/Current Status    Subjective:     Patient ID: Christina Tomlinson is a 61 y.o. female.  Diagnosis:   1. Left shoulder pain, unspecified chronicity     2. Decreased ROM of left shoulder       Pain: 0/10  Pt reported "I'm feeling pretty good, better than last time. " Pt reports compliance with HEP.  Pt reports feels comfortable continuing with HEP after d/c    Objective:     Range of Motion taken 6/27/16: (improvement since eval)  Shoulder Right MMT Left MMT Pain/Dysfunction with Movement     AROM  AROM      flexion 118 (+28) 3/5 135 (+30) 5/5 Pain at end range   extension 52 (+2) 3+/5 50 (+15) 5/5     abduction 100 (+15) 3/5 120 (+35) 3+/5 Pain at end range with L   adduction 10 (+10) NT 10 (+5) NT     Internal rotation 75 (+35) 5/5 75 (+45) 5/5    ER at 90° abd 75 (+30) 5/5 75 (+30) 5/5    ER at 0° abd 65 (=) 5/5 50 (+20) 5/5       FOTO score: 43% limitation with UE function      Pt seen by OT this session. Treatment consisted of the following:     Date: 6/27/17    Visit: 17   Pulleys 3 min FF/ABD   Scapular squeezes with ER 10 reps, against wall with towel roll    IR with dowel 10 reps   Wall slides 10 reps, as tolerated   PROM- 10 reps, all planes   Dowel - FF 2 x 10 reps, 2#   SL ER/ABD- 2 x 10 reps, B UE, 1#    SL gator 10 reps, B UE   tband- red    -rows 2 x 15 reps   -ext 2 x 15 reps   -IR/ER 2 x 15 reps each    -biceps curls 2 x 15 reps    UBE - L5 2'30" forward, 2'30" back          Assessment:     Pt tolerated treatment fairly well today.  Pt reported improved pain today.  Pt reassessed for discharge from skilled OT services today.  Pt demonstrates improved overall " pain, increased B UE strength, and improved B AROM of shoulders. Pt does report some aching in multiple body joints. Pt reports it is possibly due to arthritis.  Pt reports compliance with HEP. Pt cont to present with min pain in L/R shoulder, weakness, and decreased scapular strength. Pt reports occasional pain with reaching overhead.  Pt with good participation. Pt partially met goals.  Pt has received the maximum benefit of OT at this time. No further skilled OT services recommended at this time.    Patient Education/Response:     Cont HEP.     Plans and Goals:     Discharge pt from skilled OT services at this time.    Goals to be met in 4 weeks: (5/4/17)  1) Initiate Hep ---met  2) Pt will increase B shoulder AROM by 10 degrees grossly for improved performance with overhead ADL's---met  3) Pt will report 3/10 pain in (L)shoulder at worst---met  4) Pt will demonstrate increased MMT to 3+/5 grossly in B shoulders---not fully met, met in L shoulder        Goals to be met by discharge:  1) Independent with HEP---met  2) Pt will demonstrate (L/R) shoulder AROM WFL grossly for [place] with ADL's---met  3) Pt will demonstrate (L/R) shoulder MMT WFL grossly for [place] with functional activities---not fully met  4) Independent and pain free with ADL's and IADL's---not consistently met  5) Patient will be able to achieve FOTO score less than or equal to 46% limitation with UE function. ---met    BASHIR Stewart      REHAB SERVICES OUTPATIENT DISCHARGE SUMMARY  Occupational Therapy      Name:  Christina Tomlinson  Date:  6/27/17  Date of Evaluation:  4/4/17  Physician:  Dr. Aguirre  Total # Of Visits: 17 Cancelled:  2  No Shows:  0  Diagnosis:    1. Left shoulder pain, unspecified chronicity     2. Decreased ROM of left shoulder         Physical/Functional Status:  At time of discharge, patient was able to demonstrate L shoulder AROM and strength WFL, improved R shoulder AROM, improved strength, and  improved overall pain of B shoulders.  Pt had improved FOTO score as well. See note above    The patient is to be discharged from our Therapy service for the following reason(s):  Patient has reached the maximum rehab potential for the present time and Patient has completed allowable visits authorized by insurance    Degree of Goal Achievement:  Patient has partially met goals. See note above    Patient Education:  Reviewed shoulder ROM ex, tband ex, stretches and modalities prn. Pt verbalized and demonstrated understanding of education.    Discharge Plan:  Follow up with MD as needed

## 2017-07-03 PROBLEM — M25.512 LEFT SHOULDER PAIN: Status: RESOLVED | Noted: 2017-04-04 | Resolved: 2017-07-03

## 2017-07-03 PROBLEM — M25.612 DECREASED ROM OF LEFT SHOULDER: Status: RESOLVED | Noted: 2017-04-04 | Resolved: 2017-07-03

## 2017-07-04 RX ORDER — FUROSEMIDE 40 MG/1
TABLET ORAL
Qty: 90 TABLET | Refills: 3 | Status: SHIPPED | OUTPATIENT
Start: 2017-07-04 | End: 2018-07-29 | Stop reason: SDUPTHER

## 2017-07-24 ENCOUNTER — TELEPHONE (OUTPATIENT)
Dept: CARDIOLOGY | Facility: CLINIC | Age: 62
End: 2017-07-24

## 2017-07-24 RX ORDER — ATORVASTATIN CALCIUM 40 MG/1
40 TABLET, FILM COATED ORAL DAILY
Qty: 90 TABLET | Refills: 3 | Status: SHIPPED | OUTPATIENT
Start: 2017-07-24 | End: 2018-07-24 | Stop reason: SDUPTHER

## 2017-07-24 RX ORDER — ATORVASTATIN CALCIUM 40 MG/1
40 TABLET, FILM COATED ORAL DAILY
Qty: 30 TABLET | Refills: 6 | Status: CANCELLED | OUTPATIENT
Start: 2017-07-24

## 2017-07-24 NOTE — TELEPHONE ENCOUNTER
----- Message from Tania Li sent at 7/21/2017  2:10 PM CDT -----  Contact: [phone]/self   Pt called stating pharmacy its telling the pt that her insurance only cover 30 day supply on rx atorvastatin (LIPITOR) 40 MG tablet. Pharmacy needs a new prescription . Please advise

## 2017-07-24 NOTE — TELEPHONE ENCOUNTER
----- Message from Carlota Stuart MA sent at 7/24/2017 10:45 AM CDT -----  Contact: self, [phone]      ----- Message -----  From: Evette Ag  Sent: 7/24/2017   9:28 AM  To: [facility] Staff    Patient requests her Atorvastatin 40MG medication refill sent to pharmacy. Please advise.

## 2017-08-28 ENCOUNTER — TELEPHONE (OUTPATIENT)
Dept: CARDIOLOGY | Facility: CLINIC | Age: 62
End: 2017-08-28

## 2017-08-28 NOTE — TELEPHONE ENCOUNTER
----- Message from Teresita Tamez sent at 2017  2:55 PM CDT -----  Contact: self  Pt's handicapped sticker will  at the end of this month ( Thursday ).  Pt is requesting Dr. Solorio fill out forms for a new one.

## 2017-08-29 NOTE — TELEPHONE ENCOUNTER
Patient notified that she will need an appointment and she decided to keep the on she has in sept.

## 2017-09-04 ENCOUNTER — HOSPITAL ENCOUNTER (EMERGENCY)
Facility: HOSPITAL | Age: 62
Discharge: HOME | End: 2017-09-04
Attending: EMERGENCY MEDICINE
Payer: MEDICAID

## 2017-09-04 VITALS
RESPIRATION RATE: 20 BRPM | TEMPERATURE: 99 F | DIASTOLIC BLOOD PRESSURE: 71 MMHG | SYSTOLIC BLOOD PRESSURE: 167 MMHG | WEIGHT: 170 LBS | BODY MASS INDEX: 29.02 KG/M2 | OXYGEN SATURATION: 99 % | HEART RATE: 90 BPM | HEIGHT: 64 IN

## 2017-09-04 DIAGNOSIS — J02.9 PHARYNGITIS, UNSPECIFIED ETIOLOGY: Primary | ICD-10-CM

## 2017-09-04 DIAGNOSIS — M06.9 RHEUMATOID ARTHRITIS, INVOLVING UNSPECIFIED SITE, UNSPECIFIED RHEUMATOID FACTOR PRESENCE: ICD-10-CM

## 2017-09-04 DIAGNOSIS — K21.9 GASTROESOPHAGEAL REFLUX DISEASE, ESOPHAGITIS PRESENCE NOT SPECIFIED: ICD-10-CM

## 2017-09-04 PROCEDURE — 99283 EMERGENCY DEPT VISIT LOW MDM: CPT

## 2017-09-04 PROCEDURE — 25000003 PHARM REV CODE 250: Performed by: EMERGENCY MEDICINE

## 2017-09-04 RX ORDER — TRAMADOL HYDROCHLORIDE AND ACETAMINOPHEN 37.5; 325 MG/1; MG/1
1-2 TABLET, FILM COATED ORAL EVERY 6 HOURS PRN
Qty: 20 TABLET | Refills: 0 | Status: SHIPPED | OUTPATIENT
Start: 2017-09-04 | End: 2018-04-04 | Stop reason: DRUGHIGH

## 2017-09-04 RX ORDER — ESOMEPRAZOLE MAGNESIUM 40 MG/1
40 CAPSULE, DELAYED RELEASE ORAL DAILY
Qty: 30 CAPSULE | Refills: 0 | Status: SHIPPED | OUTPATIENT
Start: 2017-09-04 | End: 2018-04-09

## 2017-09-04 RX ORDER — AMOXICILLIN 875 MG/1
875 TABLET, FILM COATED ORAL 2 TIMES DAILY
Qty: 20 TABLET | Refills: 0 | Status: SHIPPED | OUTPATIENT
Start: 2017-09-04 | End: 2018-04-09

## 2017-09-04 RX ADMIN — LIDOCAINE HYDROCHLORIDE: 20 SOLUTION ORAL; TOPICAL at 01:09

## 2017-09-04 NOTE — ED PROVIDER NOTES
Encounter Date: 9/4/2017       History     Chief Complaint   Patient presents with    Oral Swelling     sore throat for 2 days    Cough     Sore throat and pain with swallowing for the last 2 days.  Pain is mostly to the right side of the throat.  No fever.  No problems handling secretions.   Rated as mild.              Review of patient's allergies indicates:  No Known Allergies  Past Medical History:   Diagnosis Date    Arthritis     Cardiomyopathy     Coronary artery disease     GERD (gastroesophageal reflux disease)     Hypertension      Past Surgical History:   Procedure Laterality Date    CHOLECYSTECTOMY      COLONOSCOPY N/A 2/1/2016    Procedure: COLONOSCOPY;  Surgeon: Aidan Reynoso Jr., MD;  Location: [facility];  Service: Endoscopy;  Laterality: N/A;    HYSTERECTOMY      JOINT REPLACEMENT Right     TKA    KNEE ARTHROSCOPY      OTHER SURGICAL HISTORY      PARATHYROIDECTOMY      TONSILLECTOMY       Family History   Problem Relation Age of Onset    Diabetes Father     Kidney disease Father     Heart failure Mother     Heart disease Mother      Social History   Substance Use Topics    Smoking status: Never Smoker    Smokeless tobacco: Never Used    Alcohol use No     Review of Systems   Constitutional: Negative for fever.   HENT: Positive for sore throat and trouble swallowing.    Respiratory: Negative for shortness of breath.    Cardiovascular: Negative for chest pain.   Gastrointestinal: Negative for nausea.   Genitourinary: Negative for dysuria.   Musculoskeletal: Negative for back pain.   Skin: Negative for rash.   Neurological: Negative for weakness.   Hematological: Does not bruise/bleed easily.   All other systems reviewed and are negative.      Physical Exam     Initial Vitals [09/04/17 1057]   BP Pulse Resp Temp SpO2   (!) 188/84 95 20 99.4 °F (37.4 °C) 99 %      MAP       118.67         Physical Exam    Constitutional: Vital signs are normal. She appears well-developed and  well-nourished. She is cooperative.  Non-toxic appearance.   HENT:   Head: Normocephalic and atraumatic.   Mouth/Throat: No trismus in the jaw. No uvula swelling. Posterior oropharyngeal erythema present. No oropharyngeal exudate, posterior oropharyngeal edema or tonsillar abscesses.   Eyes: Conjunctivae, EOM and lids are normal.   Neck: Trachea normal and normal range of motion. Neck supple. No stridor present. No tracheal deviation present. No neck rigidity. No Brudzinski's sign and no Kernig's sign noted.   Cardiovascular: Normal rate, regular rhythm, normal heart sounds and normal pulses.   Abdominal: Soft. Normal appearance and bowel sounds are normal. She exhibits no abdominal bruit. There is no tenderness. There is no rebound.   Lymphadenopathy:     She has cervical adenopathy.        Right cervical: Superficial cervical adenopathy present.   Neurological: She is alert and oriented to person, place, and time. She has normal strength. GCS eye subscore is 4. GCS verbal subscore is 5. GCS motor subscore is 6.   Skin: Skin is warm, dry and intact. Capillary refill takes less than 2 seconds.   Psychiatric: She has a normal mood and affect. Her behavior is normal. Judgment normal. Thought content is not delusional. She expresses no homicidal and no suicidal ideation.         ED Course   Procedures  Labs Reviewed - No data to display         I have a low suspicion for medical, surgical or other life threatening illness and I believe patient is safe for discharge.  I specifically counseled the patient and/or family/caretaker that despite an unrevealing evaluation in the ED, patient may still be at risk for serious, even life threatening illness.  I have attempted to answer all questions related to her stay today.  I have given the patient explicit instructions to return immediately for any worsening or change in current symptoms, or for any concern.       Relatively immunocompromised due to mtx.  Will give empiric  abx coverage.                      ED Course      Clinical Impression:   The primary encounter diagnosis was Pharyngitis, unspecified etiology. Diagnoses of Rheumatoid arthritis, involving unspecified site, unspecified rheumatoid factor presence and Gastroesophageal reflux disease, esophagitis presence not specified were also pertinent to this visit.                           Stepan Forde MD  09/04/17 7709

## 2017-09-11 ENCOUNTER — OFFICE VISIT (OUTPATIENT)
Dept: CARDIOLOGY | Facility: CLINIC | Age: 62
End: 2017-09-11
Payer: MEDICAID

## 2017-09-11 VITALS — OXYGEN SATURATION: 97 % | SYSTOLIC BLOOD PRESSURE: 129 MMHG | HEART RATE: 76 BPM | DIASTOLIC BLOOD PRESSURE: 76 MMHG

## 2017-09-11 DIAGNOSIS — I34.0 MODERATE MITRAL INSUFFICIENCY: ICD-10-CM

## 2017-09-11 DIAGNOSIS — I11.0 CARDIOMYOPATHY DUE TO HYPERTENSION, WITH HEART FAILURE: Primary | ICD-10-CM

## 2017-09-11 DIAGNOSIS — I43 CARDIOMYOPATHY DUE TO HYPERTENSION, WITH HEART FAILURE: Primary | ICD-10-CM

## 2017-09-11 DIAGNOSIS — I10 ESSENTIAL HYPERTENSION: ICD-10-CM

## 2017-09-11 DIAGNOSIS — I25.119 CORONARY ARTERY DISEASE INVOLVING NATIVE CORONARY ARTERY OF NATIVE HEART WITH ANGINA PECTORIS: ICD-10-CM

## 2017-09-11 PROCEDURE — 3074F SYST BP LT 130 MM HG: CPT | Mod: ,,, | Performed by: INTERNAL MEDICINE

## 2017-09-11 PROCEDURE — 3078F DIAST BP <80 MM HG: CPT | Mod: ,,, | Performed by: INTERNAL MEDICINE

## 2017-09-11 PROCEDURE — 99999 PR PBB SHADOW E&M-EST. PATIENT-LVL II: CPT | Mod: PBBFAC,,, | Performed by: INTERNAL MEDICINE

## 2017-09-11 PROCEDURE — 3008F BODY MASS INDEX DOCD: CPT | Mod: ,,, | Performed by: INTERNAL MEDICINE

## 2017-09-11 PROCEDURE — 99212 OFFICE O/P EST SF 10 MIN: CPT | Mod: PBBFAC,PO | Performed by: INTERNAL MEDICINE

## 2017-09-11 PROCEDURE — 99214 OFFICE O/P EST MOD 30 MIN: CPT | Mod: S$PBB,,, | Performed by: INTERNAL MEDICINE

## 2017-09-11 NOTE — PROGRESS NOTES
Subjective:   Patient ID:  Christina Tomlinson is a 61 y.o. female who presents for follow-up of No chief complaint on file.      Problem List Items Addressed This Visit        Cardiac/Vascular    Cardiomyopathy due to hypertension, with heart failure - Primary    Essential hypertension    Coronary artery disease involving native coronary artery of native heart with angina pectoris    Moderate mitral insufficiency      Other Visit Diagnoses    None.         HPI: Patient is doing well with no acute concerns. She is here for f/u of [facility] with moderate MR. She is having REYNA with no chest pain. REYNA is mostly during moderate activity with rehab.   No orthopnea or PND. BP is controlled. She is compliant with medications.      Review of Systems   Constitution: Negative.   HENT: Negative.    Eyes: Negative.    Cardiovascular: Negative for chest pain, claudication, cyanosis, dyspnea on exertion, irregular heartbeat, leg swelling, near-syncope, orthopnea, palpitations, paroxysmal nocturnal dyspnea and syncope.   Respiratory: Negative.    Endocrine: Negative.    Hematologic/Lymphatic: Negative.    Skin: Negative.    Musculoskeletal: Negative.    Gastrointestinal: Negative.    Neurological: Negative.    Psychiatric/Behavioral: Negative.      Patient's Medications   New Prescriptions    No medications on file   Previous Medications    ACETAMINOPHEN (TYLENOL) 500 MG TABLET    Take 500 mg by mouth every 6 (six) hours as needed for Pain.    AMOXICILLIN (AMOXIL) 875 MG TABLET    Take 1 tablet (875 mg total) by mouth 2 (two) times daily.    ASPIRIN (ECOTRIN) 81 MG EC TABLET    Take 1 tablet (81 mg total) by mouth once.    ATORVASTATIN (LIPITOR) 40 MG TABLET    Take 1 tablet (40 mg total) by mouth once daily.    CHOLECALCIFEROL, VITAMIN D3, 3,000 UNIT TAB    Take by mouth.    ESOMEPRAZOLE (NEXIUM) 40 MG CAPSULE    Take 1 capsule (40 mg total) by mouth once daily.    FERROUS GLUCONATE (FERGON) 240 (27 FE) MG TABLET    Take 480 mg by  mouth 3 (three) times daily.    FOLIC ACID (FOLVITE) 800 MCG TAB    Take 800 mcg by mouth once daily.    FUROSEMIDE (LASIX) 40 MG TABLET    TAKE ONE TABLET BY MOUTH ONCE DAILY    HYDROXYCHLOROQUINE (PLAQUENIL) 200 MG TABLET    Take 200 mg by mouth once daily.    LISINOPRIL (PRINIVIL,ZESTRIL) 20 MG TABLET    TAKE ONE TABLET BY MOUTH ONCE DAILY    METHOTREXATE 2.5 MG TAB    Take 15 mg by mouth.     METOPROLOL SUCCINATE (TOPROL-XL) 50 MG 24 HR TABLET    TAKE ONE TABLET BY MOUTH ONCE DAILY    TRAMADOL-ACETAMINOPHEN 37.5-325 MG (ULTRACET) 37.5-325 MG TAB    Take 1-2 tablets by mouth every 6 (six) hours as needed for Pain.   Modified Medications    No medications on file   Discontinued Medications    No medications on file       Objective:   Physical Exam   Constitutional: She is oriented to person, place, and time. She appears well-developed and well-nourished. No distress.   Examination of the digits showed no clubbing or cyanosis   HENT:   Head: Normocephalic and atraumatic.   Eyes: Conjunctivae are normal. Pupils are equal, round, and reactive to light. Right eye exhibits no discharge.   Neck: Normal range of motion. Neck supple. No JVD present. No thyromegaly present.   No carotid bruits   Cardiovascular: Normal rate, regular rhythm, S1 normal, S2 normal, intact distal pulses and normal pulses.  PMI is not displaced.  Exam reveals no gallop, no friction rub and no opening snap.    Murmur heard.  Pulmonary/Chest: Effort normal and breath sounds normal. No respiratory distress. She has no wheezes. She has no rales. She exhibits no tenderness.   Abdominal: Soft. Bowel sounds are normal. She exhibits no distension and no mass. There is no tenderness. There is no guarding.   No hepatosplenomegaly   Musculoskeletal: Normal range of motion. She exhibits no edema or tenderness.   Lymphadenopathy:     She has no cervical adenopathy.   Neurological: She is alert and oriented to person, place, and time.   Skin: Skin is warm. No  rash noted. She is not diaphoretic. No erythema.   Psychiatric: She has a normal mood and affect.   Nursing note and vitals reviewed.      ECGs reviewed-NSR with [facility]  LABS reviewed  Imaging including Echoes reviewed- ef 45% with moderate MR  Angiographic Results     Diagnostic:          Patient has a left dominant coronary artery.        - Left Main Coronary Artery:             The ostial LM is normal. There is ARIELA 3 flow.     - Left Anterior Descending Artery:             The LAD is normal. There is ARIELA 3 flow.     - D1:             The D1 has luminal irregularities. There is ARIELA 3 flow.     - Ramus:             The proximal ramus has a 60% stenosis. There is ARIELA 3 flow.     - Left Circumflex Artery:             The LCX is normal. There is ARIELA 3 flow.     - Right Coronary Artery:             The proximal RCA has a 75% stenosis. There is ARIELA 3 flow. The remaining portion of the vessel is of small caliber.      Assessment:     1. Cardiomyopathy due to hypertension, with heart failure    2. Essential hypertension    3. Moderate mitral insufficiency    4. Coronary artery disease involving native coronary artery of native heart with angina pectoris        Plan:     Continue current medications  Low salt diet  Activity as tolerate  F/u in 6 months.

## 2017-12-06 ENCOUNTER — PATIENT MESSAGE (OUTPATIENT)
Dept: RESEARCH | Facility: HOSPITAL | Age: 62
End: 2017-12-06

## 2018-03-26 ENCOUNTER — OFFICE VISIT (OUTPATIENT)
Dept: CARDIOLOGY | Facility: CLINIC | Age: 63
End: 2018-03-26
Payer: MEDICAID

## 2018-03-26 VITALS
BODY MASS INDEX: 35.2 KG/M2 | WEIGHT: 206.19 LBS | HEIGHT: 64 IN | DIASTOLIC BLOOD PRESSURE: 75 MMHG | SYSTOLIC BLOOD PRESSURE: 138 MMHG | HEART RATE: 73 BPM

## 2018-03-26 DIAGNOSIS — I34.0 MODERATE MITRAL INSUFFICIENCY: ICD-10-CM

## 2018-03-26 DIAGNOSIS — I25.119 CORONARY ARTERY DISEASE INVOLVING NATIVE CORONARY ARTERY OF NATIVE HEART WITH ANGINA PECTORIS: ICD-10-CM

## 2018-03-26 DIAGNOSIS — I10 ESSENTIAL HYPERTENSION: ICD-10-CM

## 2018-03-26 DIAGNOSIS — I11.0 CARDIOMYOPATHY DUE TO HYPERTENSION, WITH HEART FAILURE: Primary | ICD-10-CM

## 2018-03-26 DIAGNOSIS — I43 CARDIOMYOPATHY DUE TO HYPERTENSION, WITH HEART FAILURE: Primary | ICD-10-CM

## 2018-03-26 PROCEDURE — 99213 OFFICE O/P EST LOW 20 MIN: CPT | Mod: PBBFAC,PO | Performed by: INTERNAL MEDICINE

## 2018-03-26 PROCEDURE — 99214 OFFICE O/P EST MOD 30 MIN: CPT | Mod: S$PBB,,, | Performed by: INTERNAL MEDICINE

## 2018-03-26 PROCEDURE — 99999 PR PBB SHADOW E&M-EST. PATIENT-LVL III: CPT | Mod: PBBFAC,,, | Performed by: INTERNAL MEDICINE

## 2018-03-26 NOTE — PROGRESS NOTES
Subjective:   Patient ID:  Christina Tomlinson is a 62 y.o. female who presents for follow-up of Follow-up      Problem List Items Addressed This Visit        Cardiac/Vascular    Cardiomyopathy due to hypertension, with heart failure - Primary    Essential hypertension    Coronary artery disease involving native coronary artery of native heart with angina pectoris    Moderate mitral insufficiency          HPI: Patient is doing well with no acute concerns. She is here for f/u of [facility] with moderate MR. She is retired but goes to [employer] to exercise. She have gained some weight but denies any symptoms such as chest pain or dyspnea. No orthopnea or PND.   BP is controlled. She is compliant with medications and low salt diet.  Gained 15 lbs since May 2017.      Review of Systems   Constitution: Negative.   HENT: Negative.    Eyes: Negative.    Cardiovascular: Negative for chest pain, claudication, cyanosis, dyspnea on exertion, irregular heartbeat, leg swelling, near-syncope, orthopnea, palpitations, paroxysmal nocturnal dyspnea and syncope.   Respiratory: Negative.    Endocrine: Negative.    Hematologic/Lymphatic: Negative.    Skin: Negative.    Musculoskeletal: Negative.    Gastrointestinal: Negative.    Neurological: Negative.    Psychiatric/Behavioral: Negative.      Patient's Medications   New Prescriptions    No medications on file   Previous Medications    ACETAMINOPHEN (TYLENOL) 500 MG TABLET    Take 500 mg by mouth every 6 (six) hours as needed for Pain.    AMOXICILLIN (AMOXIL) 875 MG TABLET    Take 1 tablet (875 mg total) by mouth 2 (two) times daily.    ASPIRIN (ECOTRIN) 81 MG EC TABLET    Take 1 tablet (81 mg total) by mouth once.    ATORVASTATIN (LIPITOR) 40 MG TABLET    Take 1 tablet (40 mg total) by mouth once daily.    CHOLECALCIFEROL, VITAMIN D3, 3,000 UNIT TAB    Take by mouth.    ESOMEPRAZOLE (NEXIUM) 40 MG CAPSULE    Take 1 capsule (40 mg total) by mouth once daily.    FERROUS GLUCONATE (FERGON)  240 (27 FE) MG TABLET    Take 480 mg by mouth 3 (three) times daily.    FOLIC ACID (FOLVITE) 800 MCG TAB    Take 800 mcg by mouth once daily.    FUROSEMIDE (LASIX) 40 MG TABLET    TAKE ONE TABLET BY MOUTH ONCE DAILY    HYDROXYCHLOROQUINE (PLAQUENIL) 200 MG TABLET    Take 200 mg by mouth once daily.    LISINOPRIL (PRINIVIL,ZESTRIL) 20 MG TABLET    TAKE ONE TABLET BY MOUTH ONCE DAILY    METHOTREXATE 2.5 MG TAB    Take 15 mg by mouth.     METOPROLOL SUCCINATE (TOPROL-XL) 50 MG 24 HR TABLET    TAKE ONE TABLET BY MOUTH ONCE DAILY    TRAMADOL-ACETAMINOPHEN 37.5-325 MG (ULTRACET) 37.5-325 MG TAB    Take 1-2 tablets by mouth every 6 (six) hours as needed for Pain.   Modified Medications    No medications on file   Discontinued Medications    No medications on file       Objective:   Physical Exam   Constitutional: She is oriented to person, place, and time. She appears well-developed and well-nourished. No distress.   Examination of the digits showed no clubbing or cyanosis   HENT:   Head: Normocephalic and atraumatic.   Eyes: Conjunctivae are normal. Pupils are equal, round, and reactive to light. Right eye exhibits no discharge.   Neck: Normal range of motion. Neck supple. No JVD present. No thyromegaly present.   No carotid bruits   Cardiovascular: Normal rate, regular rhythm, S1 normal, S2 normal, intact distal pulses and normal pulses.  PMI is not displaced.  Exam reveals no gallop, no friction rub and no opening snap.    Murmur heard.  Pulmonary/Chest: Effort normal and breath sounds normal. No respiratory distress. She has no wheezes. She has no rales. She exhibits no tenderness.   Abdominal: Soft. Bowel sounds are normal. She exhibits no distension and no mass. There is no tenderness. There is no guarding.   No hepatosplenomegaly   Musculoskeletal: Normal range of motion. She exhibits no edema or tenderness.   Lymphadenopathy:     She has no cervical adenopathy.   Neurological: She is alert and oriented to person,  place, and time.   Skin: Skin is warm. No rash noted. She is not diaphoretic. No erythema.   Psychiatric: She has a normal mood and affect.   Nursing note and vitals reviewed.      ECGs reviewed-NSR with [facility]  LABS reviewed  Imaging including Echoes reviewed- ef 45% with moderate MR  Angiographic Results     Diagnostic:          Patient has a left dominant coronary artery.        - Left Main Coronary Artery:             The ostial LM is normal. There is ARIELA 3 flow.     - Left Anterior Descending Artery:             The LAD is normal. There is ARIELA 3 flow.     - D1:             The D1 has luminal irregularities. There is ARIELA 3 flow.     - Ramus:             The proximal ramus has a 60% stenosis. There is ARIELA 3 flow.     - Left Circumflex Artery:             The LCX is normal. There is ARIELA 3 flow.     - Right Coronary Artery:             The proximal RCA has a 75% stenosis. There is ARIELA 3 flow. The remaining portion of the vessel is of small caliber.      Assessment:     1. Cardiomyopathy due to hypertension, with heart failure    2. Essential hypertension    3. Coronary artery disease involving native coronary artery of native heart with angina pectoris    4. Moderate mitral insufficiency        Plan:     Continue current medications  Low salt diet  Activity as tolerate  F/u in 6 months.

## 2018-04-03 ENCOUNTER — TELEPHONE (OUTPATIENT)
Dept: CARDIOLOGY | Facility: CLINIC | Age: 63
End: 2018-04-03

## 2018-04-03 NOTE — TELEPHONE ENCOUNTER
----- Message from Teresita Tamez sent at 4/3/2018  1:37 PM CDT -----  Contact: Pt.   Pt went to [facility] last night for acid reflux, pt was given 4 RX's but before she got them filled shewanted to know if it was OK to take them.   1) Zantac Max strength 150MG  2) Bentyl 20MG  3) Carafate 1GM  4) Zofran ODT 4MG    Please call pt to advise

## 2018-04-04 ENCOUNTER — HOSPITAL ENCOUNTER (EMERGENCY)
Facility: HOSPITAL | Age: 63
Discharge: HOME | End: 2018-04-04
Attending: EMERGENCY MEDICINE
Payer: MEDICAID

## 2018-04-04 VITALS
BODY MASS INDEX: 32.44 KG/M2 | OXYGEN SATURATION: 100 % | DIASTOLIC BLOOD PRESSURE: 73 MMHG | HEART RATE: 74 BPM | RESPIRATION RATE: 57 BRPM | TEMPERATURE: 99 F | WEIGHT: 190 LBS | HEIGHT: 64 IN | SYSTOLIC BLOOD PRESSURE: 175 MMHG

## 2018-04-04 DIAGNOSIS — K85.90 ACUTE PANCREATITIS, UNSPECIFIED COMPLICATION STATUS, UNSPECIFIED PANCREATITIS TYPE: Primary | ICD-10-CM

## 2018-04-04 DIAGNOSIS — R10.13 EPIGASTRIC PAIN: ICD-10-CM

## 2018-04-04 LAB
ALBUMIN SERPL BCP-MCNC: 3.7 G/DL
ALP SERPL-CCNC: 65 U/L
ALT SERPL W/O P-5'-P-CCNC: 30 U/L
AMYLASE SERPL-CCNC: 873 U/L
ANION GAP SERPL CALC-SCNC: 11 MMOL/L
AST SERPL-CCNC: 28 U/L
BASOPHILS # BLD AUTO: 0.02 K/UL
BASOPHILS NFR BLD: 0.3 %
BILIRUB SERPL-MCNC: 0.3 MG/DL
BUN SERPL-MCNC: 16 MG/DL
CALCIUM SERPL-MCNC: 9.1 MG/DL
CHLORIDE SERPL-SCNC: 104 MMOL/L
CO2 SERPL-SCNC: 28 MMOL/L
CREAT SERPL-MCNC: 1.11 MG/DL
DIFFERENTIAL METHOD: ABNORMAL
EOSINOPHIL # BLD AUTO: 0.1 K/UL
EOSINOPHIL NFR BLD: 2.1 %
ERYTHROCYTE [DISTWIDTH] IN BLOOD BY AUTOMATED COUNT: 14 %
EST. GFR  (AFRICAN AMERICAN): >60 ML/MIN/1.73 M^2
EST. GFR  (NON AFRICAN AMERICAN): 53 ML/MIN/1.73 M^2
GLUCOSE SERPL-MCNC: 97 MG/DL
HCT VFR BLD AUTO: 33.9 %
HGB BLD-MCNC: 10.7 G/DL
LIPASE SERPL-CCNC: >4000 U/L
LYMPHOCYTES # BLD AUTO: 1.5 K/UL
LYMPHOCYTES NFR BLD: 22 %
MAGNESIUM SERPL-MCNC: 2.1 MG/DL
MCH RBC QN AUTO: 30.4 PG
MCHC RBC AUTO-ENTMCNC: 31.6 G/DL
MCV RBC AUTO: 96 FL
MONOCYTES # BLD AUTO: 0.6 K/UL
MONOCYTES NFR BLD: 8.9 %
NEUTROPHILS # BLD AUTO: 4.4 K/UL
NEUTROPHILS NFR BLD: 66.5 %
PHOSPHATE SERPL-MCNC: 3.6 MG/DL
PLATELET # BLD AUTO: 204 K/UL
PMV BLD AUTO: 10 FL
POTASSIUM SERPL-SCNC: 3.6 MMOL/L
PROT SERPL-MCNC: 6.8 G/DL
RBC # BLD AUTO: 3.52 M/UL
SODIUM SERPL-SCNC: 143 MMOL/L
TROPONIN I SERPL DL<=0.01 NG/ML-MCNC: <0.012 NG/ML
WBC # BLD AUTO: 6.6 K/UL

## 2018-04-04 PROCEDURE — 84100 ASSAY OF PHOSPHORUS: CPT

## 2018-04-04 PROCEDURE — 63600175 PHARM REV CODE 636 W HCPCS: Performed by: PHYSICIAN ASSISTANT

## 2018-04-04 PROCEDURE — 99285 EMERGENCY DEPT VISIT HI MDM: CPT | Mod: 25

## 2018-04-04 PROCEDURE — 93010 ELECTROCARDIOGRAM REPORT: CPT | Mod: ,,, | Performed by: INTERNAL MEDICINE

## 2018-04-04 PROCEDURE — 25000003 PHARM REV CODE 250: Performed by: PHYSICIAN ASSISTANT

## 2018-04-04 PROCEDURE — 93005 ELECTROCARDIOGRAM TRACING: CPT

## 2018-04-04 PROCEDURE — 83690 ASSAY OF LIPASE: CPT

## 2018-04-04 PROCEDURE — 85025 COMPLETE CBC W/AUTO DIFF WBC: CPT

## 2018-04-04 PROCEDURE — S0028 INJECTION, FAMOTIDINE, 20 MG: HCPCS | Performed by: PHYSICIAN ASSISTANT

## 2018-04-04 PROCEDURE — 83735 ASSAY OF MAGNESIUM: CPT

## 2018-04-04 PROCEDURE — 96361 HYDRATE IV INFUSION ADD-ON: CPT

## 2018-04-04 PROCEDURE — 96375 TX/PRO/DX INJ NEW DRUG ADDON: CPT

## 2018-04-04 PROCEDURE — 80053 COMPREHEN METABOLIC PANEL: CPT

## 2018-04-04 PROCEDURE — 25500020 PHARM REV CODE 255: Performed by: PHYSICIAN ASSISTANT

## 2018-04-04 PROCEDURE — 96374 THER/PROPH/DIAG INJ IV PUSH: CPT | Mod: 59

## 2018-04-04 PROCEDURE — 82150 ASSAY OF AMYLASE: CPT

## 2018-04-04 PROCEDURE — 84484 ASSAY OF TROPONIN QUANT: CPT

## 2018-04-04 RX ORDER — OMEPRAZOLE 20 MG/1
20 CAPSULE, DELAYED RELEASE ORAL DAILY
Qty: 30 CAPSULE | Refills: 0 | Status: SHIPPED | OUTPATIENT
Start: 2018-04-04 | End: 2019-09-18 | Stop reason: ALTCHOICE

## 2018-04-04 RX ORDER — METOCLOPRAMIDE HYDROCHLORIDE 5 MG/ML
10 INJECTION INTRAMUSCULAR; INTRAVENOUS
Status: COMPLETED | OUTPATIENT
Start: 2018-04-04 | End: 2018-04-04

## 2018-04-04 RX ORDER — TRAMADOL HYDROCHLORIDE 50 MG/1
50 TABLET ORAL EVERY 6 HOURS PRN
Qty: 12 TABLET | Refills: 0 | Status: SHIPPED | OUTPATIENT
Start: 2018-04-04 | End: 2018-04-07

## 2018-04-04 RX ORDER — CLONIDINE HYDROCHLORIDE 0.2 MG/1
0.2 TABLET ORAL
Status: COMPLETED | OUTPATIENT
Start: 2018-04-04 | End: 2018-04-04

## 2018-04-04 RX ORDER — FAMOTIDINE 10 MG/ML
20 INJECTION INTRAVENOUS 2 TIMES DAILY
Status: DISCONTINUED | OUTPATIENT
Start: 2018-04-04 | End: 2018-04-04 | Stop reason: HOSPADM

## 2018-04-04 RX ORDER — MORPHINE SULFATE 4 MG/ML
4 INJECTION, SOLUTION INTRAMUSCULAR; INTRAVENOUS
Status: COMPLETED | OUTPATIENT
Start: 2018-04-04 | End: 2018-04-04

## 2018-04-04 RX ADMIN — METOCLOPRAMIDE 10 MG: 5 INJECTION, SOLUTION INTRAMUSCULAR; INTRAVENOUS at 03:04

## 2018-04-04 RX ADMIN — MORPHINE SULFATE 4 MG: 4 INJECTION INTRAVENOUS at 06:04

## 2018-04-04 RX ADMIN — FAMOTIDINE 20 MG: 10 INJECTION, SOLUTION INTRAVENOUS at 03:04

## 2018-04-04 RX ADMIN — CLONIDINE HYDROCHLORIDE 0.2 MG: 0.2 TABLET ORAL at 04:04

## 2018-04-04 RX ADMIN — SODIUM CHLORIDE 1000 ML: 0.9 INJECTION, SOLUTION INTRAVENOUS at 07:04

## 2018-04-04 RX ADMIN — IOHEXOL 100 ML: 350 INJECTION, SOLUTION INTRAVENOUS at 05:04

## 2018-04-04 NOTE — ED PROVIDER NOTES
Encounter Date: 4/4/2018       History     Chief Complaint   Patient presents with    Abdominal Pain     I went to [facility] Monday and having stomach pain. I took apple cidar vinegar and grape fruit jucie sunday and monday and started feeling bad. they gave me bunch of medicine and sent home with gastritis. My stomach still hurts at the top and hasnt gotten better.      HPI  Review of patient's allergies indicates:  No Known Allergies  Past Medical History:   Diagnosis Date    Arthritis     Cardiomyopathy     Coronary artery disease     GERD (gastroesophageal reflux disease)     Hypertension      Past Surgical History:   Procedure Laterality Date    CHOLECYSTECTOMY      COLONOSCOPY N/A 2/1/2016    Procedure: COLONOSCOPY;  Surgeon: Aidan Reynoso Jr., MD;  Location: [facility];  Service: Endoscopy;  Laterality: N/A;    HYSTERECTOMY      JOINT REPLACEMENT Right     TKA    KNEE ARTHROSCOPY      OTHER SURGICAL HISTORY      PARATHYROIDECTOMY      TONSILLECTOMY       Family History   Problem Relation Age of Onset    Diabetes Father     Kidney disease Father     Heart failure Mother     Heart disease Mother      Social History   Substance Use Topics    Smoking status: Never Smoker    Smokeless tobacco: Never Used    Alcohol use No     Review of Systems    Physical Exam     Initial Vitals [04/04/18 1503]   BP Pulse Resp Temp SpO2   (!) 201/103 78 15 98.5 °F (36.9 °C) 99 %      MAP       135.67         Physical Exam    ED Course   Procedures  Labs Reviewed - No data to display                               Clinical Impression:   The primary encounter diagnosis was Acute pancreatitis, unspecified complication status, unspecified pancreatitis type. A diagnosis of Epigastric pain was also pertinent to this visit.                           YOSVANY Leos III, MD  04/05/18 4397

## 2018-04-04 NOTE — ED PROVIDER NOTES
"Encounter Date: 4/4/2018       History     Chief Complaint   Patient presents with    Abdominal Pain     I went to [facility] Monday and having stomach pain. I took apple cidar vinegar and grape fruit jucie sunday and monday and started feeling bad. they gave me bunch of medicine and sent home with gastritis. My stomach still hurts at the top and hasnt gotten better.      Patient presents with epigastric pain which began 2 days ago. Patient reports she was seen at the [facility] 2 days ago for same symptoms and was diagnosed with gastritis. She was given carafate, zofran, and zantac to go home with. She states she took the carafate and zantac without relief. Patient also reports having gallbladder removed "a while ago" and is unable to remember if this pain feels similar to before she had it removed. Patient also reports she does take a lot of NSAIDs due to her RA. She states the pain is a cramping sensation which radiates into her right back. She denies fever, chills, or vomiting.           Review of patient's allergies indicates:  No Known Allergies  Past Medical History:   Diagnosis Date    Arthritis     Cardiomyopathy     Coronary artery disease     GERD (gastroesophageal reflux disease)     Hypertension      Past Surgical History:   Procedure Laterality Date    CHOLECYSTECTOMY      COLONOSCOPY N/A 2/1/2016    Procedure: COLONOSCOPY;  Surgeon: Aidan Reynoso Jr., MD;  Location: [facility];  Service: Endoscopy;  Laterality: N/A;    HYSTERECTOMY      JOINT REPLACEMENT Right     TKA    KNEE ARTHROSCOPY      OTHER SURGICAL HISTORY      PARATHYROIDECTOMY      TONSILLECTOMY       Family History   Problem Relation Age of Onset    Diabetes Father     Kidney disease Father     Heart failure Mother     Heart disease Mother      Social History   Substance Use Topics    Smoking status: Never Smoker    Smokeless tobacco: Never Used    Alcohol use No     Review of Systems   Constitutional: " "Negative for chills and fever.   Respiratory: Negative for cough, chest tightness and shortness of breath.    Cardiovascular: Negative for chest pain.   Gastrointestinal: Positive for abdominal pain. Negative for diarrhea, nausea and vomiting.   Genitourinary: Negative for dysuria and hematuria.   Musculoskeletal: Negative for back pain.   Neurological: Negative for dizziness, weakness, light-headedness and numbness.       Physical Exam     Initial Vitals [04/04/18 1503]   BP Pulse Resp Temp SpO2   (!) 201/103 78 15 98.5 °F (36.9 °C) 99 %      MAP       135.67         Physical Exam    Nursing note and vitals reviewed.  Constitutional: She appears well-developed and well-nourished.   HENT:   Head: Normocephalic and atraumatic.   Eyes: Conjunctivae and EOM are normal. Pupils are equal, round, and reactive to light.   Neck: Normal range of motion. Neck supple.   Cardiovascular: Normal rate, regular rhythm and normal heart sounds.   Pulmonary/Chest: Breath sounds normal. No respiratory distress.   Abdominal: Soft. Bowel sounds are normal. There is tenderness in the epigastric area. There is CVA tenderness.   Musculoskeletal: Normal range of motion.   Neurological: She is alert and oriented to person, place, and time.   Skin: Skin is warm and dry. Capillary refill takes less than 2 seconds.         ED Course   Procedures  Labs Reviewed   CBC W/ AUTO DIFFERENTIAL   COMPREHENSIVE METABOLIC PANEL   LIPASE   TROPONIN I             Medical Decision Making:   Initial Assessment:   Patient presents with epigastric pain which began 2 days ago. Patient reports she was seen at the [facility] 2 days ago for same symptoms and was diagnosed with gastritis. She was given carafate, zofran, and zantac to go home with. She states she took the carafate and zantac without relief. Patient also reports having gallbladder removed "a while ago" and is unable to remember if this pain feels similar to before she had it removed. Patient " also reports she does take a lot of NSAIDs due to her RA. She states the pain is a cramping sensation which radiates into her right back. She denies fever, chills, or vomiting. Upon exam patient is tender to epigastric region with TTP to right thoracic region.   Differential Diagnosis:   PUD, gastritis, biliary colic  ED Management:  Informed patient of elevated lipase and amylase. Dr. Dinero discussed case with GI on call, Dr. Claire who reports to have patient call his office tomorrow for an appointment for Monday. In meantime, will send patient home on pain medication and PPI. Instructed to stick to clear liquid diet. Follow up with PCP as well. Patient is agreeable with plan. She is in NAD with VSS, non toxic in appearance. Pain currently under control.                       Clinical Impression:   The primary encounter diagnosis was Acute pancreatitis, unspecified complication status, unspecified pancreatitis type. A diagnosis of Epigastric pain was also pertinent to this visit.                           Gina Lepe PA-C  04/04/18 5216

## 2018-04-05 NOTE — ED NOTES
Assigned to care for patient at this time.  Report received from Gina Lepe RN.  Patient states she has improved relief of abdominal discomfort.  Patient denies any current N/V/D/SOB/CP at this time.  Daughter remains at the bedside.  Patient states to pain with palpation at the epigastrium region and RUQ of abdomen.  Patient asked RN if she will be staying in the hospital.  Dr. Powell to consult with patient at the bedside.  Will continue to monitor patient for any changes.

## 2018-04-05 NOTE — ED NOTES
Patient and family provided D/C instructions.  Patient and family instructed to follow up with GI within one day.  Patient instructed to maintain a CL diet until cleared by Gi.  Prescriptions reviewed with patient and family.  All questions and concerns address. Patient has no further questions.  Patient ready for D/C home. Steady gait noted.

## 2018-04-05 NOTE — ED PROVIDER NOTES
Attending attestation:    I personally interviewed and examined this patient.    Briefly, she has no prior history of pancreatitis, but had a cholecystectomy several years ago.  By her description, it sounds like it was elective cholecystectomy for cholelithiasis, laparoscopic, uncomplicated.    2 days ago she had eaten dinner, and had drank some apple cider vinegar, when she had sudden onset of abdominal pain.  She was seen in the emergency department at [place], reportedly had some blood work drawn that was negative, was sent home with a diagnosis of gastritis.    Returns today because of the persistence of abdominal pain.  No fevers or chills.  No alcohol consumption.  No jaundice.  No trauma to the abdomen.    She does have a past medical history of hyper calcium he had, had a parathyroidectomy 3 years ago, calcium has reportedly been normal since.    On exam, she is obese, abdomen is benign, there is mild epigastric tenderness, no peritoneal signs.  She does not appear dehydrated.  Vital signs show mild hypertension, otherwise normal.      Lab work shows that she has pancreatitis, lipase over 4000, amylase in the 800s.  Liver enzymes and bilirubin are not elevated.  White blood cell count is normal.  Calcium normal.  Renal function normal.    A CT scan was performed last shift, which was interpreted by the radiologist as unremarkable.    MDM: I agree with the PAs assessment that this patient is a good candidate for outpatient management of acute pancreatitis.  The etiology of the pancreatitis remains in question, she may have had a biliary stone that formed in past, but at present she does not have dense of biliary obstruction, and her abdomen is benign.  We will place her on a bland diet, antiemetics, mild analgesics, and she should follow-up with her primary care physician this week.  I spoke with Dr. Elaine, gastroenterology fellow on call at [facility], and the soonest clinic appointment that they have for GI,  Dr. Claire, is on Monday.  The patient is to call the clinic in the morning for an appointment time on Monday         Raz Dinero MD  04/04/18 2047

## 2018-04-09 ENCOUNTER — OFFICE VISIT (OUTPATIENT)
Dept: NEUROLOGY | Facility: HOSPITAL | Age: 63
End: 2018-04-09
Attending: INTERNAL MEDICINE
Payer: MEDICAID

## 2018-04-09 ENCOUNTER — LAB VISIT (OUTPATIENT)
Dept: LAB | Facility: HOSPITAL | Age: 63
End: 2018-04-09
Attending: INTERNAL MEDICINE
Payer: MEDICAID

## 2018-04-09 VITALS
SYSTOLIC BLOOD PRESSURE: 159 MMHG | DIASTOLIC BLOOD PRESSURE: 79 MMHG | HEIGHT: 64 IN | TEMPERATURE: 98 F | HEART RATE: 66 BPM | WEIGHT: 205.5 LBS | BODY MASS INDEX: 35.08 KG/M2

## 2018-04-09 DIAGNOSIS — K85.90 ACUTE PANCREATITIS WITHOUT INFECTION OR NECROSIS, UNSPECIFIED PANCREATITIS TYPE: ICD-10-CM

## 2018-04-09 DIAGNOSIS — K85.90 ACUTE PANCREATITIS WITHOUT INFECTION OR NECROSIS, UNSPECIFIED PANCREATITIS TYPE: Primary | ICD-10-CM

## 2018-04-09 PROCEDURE — 99214 OFFICE O/P EST MOD 30 MIN: CPT | Performed by: INTERNAL MEDICINE

## 2018-04-09 PROCEDURE — 82787 IGG 1 2 3 OR 4 EACH: CPT

## 2018-04-09 PROCEDURE — 36415 COLL VENOUS BLD VENIPUNCTURE: CPT

## 2018-04-09 RX ORDER — METHYLPREDNISOLONE 4 MG/1
4 TABLET ORAL DAILY
COMMUNITY
End: 2019-09-18

## 2018-04-09 RX ORDER — SUCRALFATE 1 G/1
1 TABLET ORAL 4 TIMES DAILY
COMMUNITY
End: 2018-08-22

## 2018-04-09 NOTE — PROGRESS NOTES
[facility]    CC: abdominal pain    HPI 62 y.o. female with pmh GERD and RA presents for acute onset severe epigastric abdominal pain associated with nausea/vomiting for one week. Patient reports severe abdominal pain after consuming grapefruit/apple cider vinegar beverage. She reports pain in epigastrum extending to RUQ/flank. She denies any alcohol intake, tobacco use or high fat diet. She reports pain worsening for two days, then presenting to ER. She was found to have acute pancreatitis with lipase >4000. She was treated with pain meds and encouraged PO clear liquid diet. She reports pain and nausea improving for past 3 days and now is tolerating a bland, low-fat diet. She denies beginning any new medications recently, no fevers or chills.    Daughter also provides history during interview.      Past Medical History:   Diagnosis Date    Arthritis     Cardiomyopathy     Coronary artery disease     GERD (gastroesophageal reflux disease)     Hypertension          Past Surgical History:   Procedure Laterality Date    CHOLECYSTECTOMY      COLONOSCOPY N/A 2/1/2016    Procedure: COLONOSCOPY;  Surgeon: Aidan Reynoso Jr., MD;  Location: [facility];  Service: Endoscopy;  Laterality: N/A;    HYSTERECTOMY      JOINT REPLACEMENT Right     TKA    KNEE ARTHROSCOPY      OTHER SURGICAL HISTORY      PARATHYROIDECTOMY      TONSILLECTOMY         Social History  Social History   Substance Use Topics    Smoking status: Never Smoker    Smokeless tobacco: Never Used    Alcohol use No         Family History   Problem Relation Age of Onset    Diabetes Father     Kidney disease Father     Heart failure Mother     Heart disease Mother        Review of Systems  General ROS: negative for chills, fever or weight loss  Ophthalmic ROS: negative for blurry vision, photophobia or eye pain  ENT ROS: negative for epistaxis, sore throat or rhinorrhea  Respiratory ROS: no cough, shortness of breath, or  "wheezing  Cardiovascular ROS: no chest pain or dyspnea on exertion  Gastrointestinal ROS: positive abdominal pain; no diarrhea or black/ bloody stools  Genito-Urinary ROS: no dysuria, trouble voiding, or hematuria  Musculoskeletal ROS: negative for gait disturbance or muscular weakness  Neurological ROS: no syncope or seizures; no ataxia  Dermatological ROS: negative for pruritis, rash and jaundice    Physical Examination  BP (!) 159/79   Pulse 66   Temp 97.5 °F (36.4 °C) (Oral)   Ht 5' 4" (1.626 m)   Wt 93.2 kg (205 lb 7.5 oz)   BMI 35.27 kg/m²   General appearance: alert, cooperative, no distress  HENT: Normocephalic, atraumatic, neck symmetrical, no nasal discharge   Eyes: conjunctivae/corneas clear, PERRL, EOM's intact  Lungs: clear to auscultation bilaterally, no dullness to percussion bilaterally  Heart: regular rate and rhythm without rub; no displacement of the PMI   Abdomen: soft, epigastric and RUQ tenderness; bowel sounds normoactive; no organomegaly  Extremities: extremities symmetric; no clubbing, cyanosis, or edema  Integument: Skin color, texture, turgor normal; no rashes; hair distrubution normal  Neurologic: Alert and oriented X 3, normal strength, normal coordination and gait  Psychiatric: no pressured speech; normal affect; no evidence of impaired cognition       Lab Results   Component Value Date    LIPASE >4000 (H) 04/04/2018       Imaging:  CT abdomen 4/4/18 -- 1. There are several nonobstructive calcifications in the left kidney. The largest one measures 9 mm and is located in the upper pole of the left kidney.   2. There is a small hiatal hernia.  3. There is a small extrarenal pelvis associated with the right kidney.   4. There is a mild amount of atherosclerosis.  5. Pancreas and biliary tract normal in appearance    I personally reviewed these images and medical records.    Assessment:   62 yoF with pmh RA and GERD presents for acute pancreatitis with unknown etiology. Patient's " biliary tract appears normal on imaging and she is s/p CCY, and she also denies alcohol ingestion. Possible etiology is HCQ and autoimmune pancreatitis, though less likely given improvement in symptoms while on medication. May also be microlithiasis, but also less likely.    Plan:   -advance to low-fat diet as tolerated  -IgG4 to evaluate for autoimmune pancreatitis  -discuss with rheumatology if HCQ should be adjusted or changed to alternative medication as this medication can be associated with pancreatitis  -if pancreatitis becomes recurrent, would proceed with EUS to evaluate biliary tract and possibly genetic testing    Curt Claire MD   [address] [place] [address], [address]   [place] [address] [phone]

## 2018-04-11 LAB — IGG4 SER-MCNC: 24 MG/DL

## 2018-04-12 ENCOUNTER — TELEPHONE (OUTPATIENT)
Dept: NEUROLOGY | Facility: HOSPITAL | Age: 63
End: 2018-04-12

## 2018-04-12 NOTE — TELEPHONE ENCOUNTER
Called patient and provided negative results for IgG4. Patient to follow up with rheumatology regarding continuation of HCQ.

## 2018-04-19 ENCOUNTER — TELEPHONE (OUTPATIENT)
Dept: INTERNAL MEDICINE | Facility: CLINIC | Age: 63
End: 2018-04-19

## 2018-06-26 RX ORDER — METOPROLOL SUCCINATE 50 MG/1
TABLET, EXTENDED RELEASE ORAL
Qty: 90 TABLET | Refills: 3 | Status: SHIPPED | OUTPATIENT
Start: 2018-06-26 | End: 2019-06-24 | Stop reason: SDUPTHER

## 2018-07-25 RX ORDER — ATORVASTATIN CALCIUM 40 MG/1
TABLET, FILM COATED ORAL
Qty: 90 TABLET | Refills: 3 | Status: SHIPPED | OUTPATIENT
Start: 2018-07-25 | End: 2019-07-22 | Stop reason: SDUPTHER

## 2018-07-30 RX ORDER — FUROSEMIDE 40 MG/1
TABLET ORAL
Qty: 90 TABLET | Refills: 3 | Status: SHIPPED | OUTPATIENT
Start: 2018-07-30 | End: 2019-08-04 | Stop reason: SDUPTHER

## 2018-08-03 ENCOUNTER — TELEPHONE (OUTPATIENT)
Dept: CARDIOLOGY | Facility: CLINIC | Age: 63
End: 2018-08-03

## 2018-08-03 NOTE — TELEPHONE ENCOUNTER
----- Message from Zofia Gutierres sent at 8/3/2018  3:21 PM CDT -----  Contact: [phone]  Patient called in requesting to speak with you. Patient prefers to speak with a nurse. Please advise.

## 2018-08-22 ENCOUNTER — OFFICE VISIT (OUTPATIENT)
Dept: CARDIOLOGY | Facility: CLINIC | Age: 63
End: 2018-08-22
Payer: MEDICAID

## 2018-08-22 VITALS
WEIGHT: 211.38 LBS | BODY MASS INDEX: 36.09 KG/M2 | SYSTOLIC BLOOD PRESSURE: 131 MMHG | OXYGEN SATURATION: 99 % | HEART RATE: 102 BPM | HEIGHT: 64 IN | DIASTOLIC BLOOD PRESSURE: 66 MMHG

## 2018-08-22 DIAGNOSIS — I25.119 CORONARY ARTERY DISEASE INVOLVING NATIVE CORONARY ARTERY OF NATIVE HEART WITH ANGINA PECTORIS: ICD-10-CM

## 2018-08-22 DIAGNOSIS — I11.0 CARDIOMYOPATHY DUE TO HYPERTENSION, WITH HEART FAILURE: Primary | ICD-10-CM

## 2018-08-22 DIAGNOSIS — I43 CARDIOMYOPATHY DUE TO HYPERTENSION, WITH HEART FAILURE: Primary | ICD-10-CM

## 2018-08-22 DIAGNOSIS — I10 ESSENTIAL HYPERTENSION: ICD-10-CM

## 2018-08-22 DIAGNOSIS — I34.0 MODERATE MITRAL INSUFFICIENCY: ICD-10-CM

## 2018-08-22 PROCEDURE — 99214 OFFICE O/P EST MOD 30 MIN: CPT | Mod: S$GLB,,, | Performed by: INTERNAL MEDICINE

## 2018-08-22 RX ORDER — PANTOPRAZOLE SODIUM 40 MG/1
40 TABLET, DELAYED RELEASE ORAL DAILY
Qty: 30 TABLET | Refills: 11 | Status: SHIPPED | OUTPATIENT
Start: 2018-08-22 | End: 2019-08-22 | Stop reason: SDUPTHER

## 2018-08-22 RX ORDER — LOSARTAN POTASSIUM 50 MG/1
50 TABLET ORAL DAILY
Qty: 90 TABLET | Refills: 3 | Status: SHIPPED | OUTPATIENT
Start: 2018-08-22 | End: 2019-03-15

## 2018-08-22 NOTE — PROGRESS NOTES
Subjective:   Patient ID:  Christina Tomlinson is a 62 y.o. female who presents for follow-up of Follow-up      Problem List Items Addressed This Visit        Cardiac/Vascular    Cardiomyopathy due to hypertension, with heart failure - Primary    Essential hypertension    Coronary artery disease involving native coronary artery of native heart with angina pectoris    Moderate mitral insufficiency          HPI: Patient with the above medical problems is here for f/u. She is doing well with no acute concerns. She is here for f/u of [facility] with moderate MR. She denies chest pain or dyspnea but having acid reflux especially after eating or having spicy foods. She is trying to follow a restricted salt diet. BP is controlled. She is compliant with medications.  Gained 5 lbs since previous visit. She is not on lisinopril as this was discontinued by PCP.       Review of Systems   Constitution: Negative.   HENT: Negative.    Eyes: Negative.    Cardiovascular: Negative for chest pain, claudication, cyanosis, dyspnea on exertion, irregular heartbeat, leg swelling, near-syncope, orthopnea, palpitations, paroxysmal nocturnal dyspnea and syncope.   Respiratory: Negative.    Endocrine: Negative.    Hematologic/Lymphatic: Negative.    Skin: Negative.    Musculoskeletal: Negative.    Gastrointestinal: Negative.    Neurological: Negative.    Psychiatric/Behavioral: Negative.      Patient's Medications   New Prescriptions    No medications on file   Previous Medications    ACETAMINOPHEN (TYLENOL) 500 MG TABLET    Take 500 mg by mouth every 6 (six) hours as needed for Pain.    ASPIRIN (ECOTRIN) 81 MG EC TABLET    Take 1 tablet (81 mg total) by mouth once.    ATORVASTATIN (LIPITOR) 40 MG TABLET    TAKE ONE TABLET BY MOUTH ONCE DAILY    CHOLECALCIFEROL, VITAMIN D3, 3,000 UNIT TAB    Take by mouth.    FERROUS GLUCONATE (FERGON) 240 (27 FE) MG TABLET    Take 480 mg by mouth 3 (three) times daily.    FOLIC ACID (FOLVITE) 800 MCG TAB    Take  800 mcg by mouth once daily.    FUROSEMIDE (LASIX) 40 MG TABLET    TAKE ONE TABLET BY MOUTH ONCE DAILY    HYDROXYCHLOROQUINE (PLAQUENIL) 200 MG TABLET    Take 200 mg by mouth once daily.    METHOTREXATE 2.5 MG TAB    Take 15 mg by mouth.     METHYLPREDNISOLONE (MEDROL) 4 MG TAB    Take 4 mg by mouth once daily.    METOPROLOL SUCCINATE (TOPROL-XL) 50 MG 24 HR TABLET    TAKE ONE TABLET BY MOUTH ONCE DAILY    OMEPRAZOLE (PRILOSEC) 20 MG CAPSULE    Take 1 capsule (20 mg total) by mouth once daily.    RANITIDINE (ZANTAC) 150 MG TABLET    Take 150 mg by mouth 2 (two) times daily.    SUCRALFATE (CARAFATE) 1 GRAM TABLET    Take 1 g by mouth 4 (four) times daily.   Modified Medications    No medications on file   Discontinued Medications    No medications on file       Objective:   Physical Exam   Constitutional: She is oriented to person, place, and time. She appears well-developed and well-nourished. No distress.   Examination of the digits showed no clubbing or cyanosis   HENT:   Head: Normocephalic and atraumatic.   Eyes: Conjunctivae are normal. Pupils are equal, round, and reactive to light. Right eye exhibits no discharge.   Neck: Normal range of motion. Neck supple. No JVD present. No thyromegaly present.   No carotid bruits   Cardiovascular: Normal rate, regular rhythm, S1 normal, S2 normal, intact distal pulses and normal pulses. PMI is not displaced. Exam reveals no gallop, no friction rub and no opening snap.   Murmur heard.  Pulmonary/Chest: Effort normal and breath sounds normal. No respiratory distress. She has no wheezes. She has no rales. She exhibits no tenderness.   Abdominal: Soft. Bowel sounds are normal. She exhibits no distension and no mass. There is no tenderness. There is no guarding.   No hepatosplenomegaly   Musculoskeletal: Normal range of motion. She exhibits no edema or tenderness.   Lymphadenopathy:     She has no cervical adenopathy.   Neurological: She is alert and oriented to person, place,  and time.   Skin: Skin is warm. No rash noted. She is not diaphoretic. No erythema.   Psychiatric: She has a normal mood and affect.   Nursing note and vitals reviewed.      ECGs reviewed-NSR with LVH  LABS reviewed  Imaging including Echoes reviewed- ef 45% with moderate MR  Angiographic Results     Diagnostic:          Patient has a left dominant coronary artery.        - Left Main Coronary Artery:             The ostial LM is normal. There is ARIELA 3 flow.     - Left Anterior Descending Artery:             The LAD is normal. There is ARIELA 3 flow.     - D1:             The D1 has luminal irregularities. There is ARIELA 3 flow.     - Ramus:             The proximal ramus has a 60% stenosis. There is ARIELA 3 flow.     - Left Circumflex Artery:             The LCX is normal. There is ARIELA 3 flow.     - Right Coronary Artery:             The proximal RCA has a 75% stenosis. There is ARIELA 3 flow. The remaining portion of the vessel is of small caliber.      Assessment:     1. Cardiomyopathy due to hypertension, with heart failure    2. Essential hypertension    3. Coronary artery disease involving native coronary artery of native heart with angina pectoris    4. Moderate mitral insufficiency        Plan:     Stop norvasc. Start losartan 50 mg po daily  protonix 40 mg po daily  Weight loss  Low salt diet  Activity as tolerate  F/u in 6 months.

## 2018-09-30 ENCOUNTER — HOSPITAL ENCOUNTER (EMERGENCY)
Facility: HOSPITAL | Age: 63
Discharge: HOME | End: 2018-09-30
Attending: FAMILY MEDICINE
Payer: MEDICAID

## 2018-09-30 VITALS
RESPIRATION RATE: 20 BRPM | TEMPERATURE: 98 F | BODY MASS INDEX: 34.33 KG/M2 | HEART RATE: 83 BPM | WEIGHT: 200 LBS | SYSTOLIC BLOOD PRESSURE: 163 MMHG | OXYGEN SATURATION: 98 % | DIASTOLIC BLOOD PRESSURE: 71 MMHG

## 2018-09-30 DIAGNOSIS — N20.0 CALCULUS OF KIDNEY: Primary | ICD-10-CM

## 2018-09-30 LAB
ALBUMIN SERPL BCP-MCNC: 4 G/DL
ALP SERPL-CCNC: 68 U/L
ALT SERPL W/O P-5'-P-CCNC: 25 U/L
ANION GAP SERPL CALC-SCNC: 7 MMOL/L
AST SERPL-CCNC: 33 U/L
BACTERIA #/AREA URNS AUTO: ABNORMAL /HPF
BASOPHILS # BLD AUTO: 0.03 K/UL
BASOPHILS NFR BLD: 0.3 %
BILIRUB SERPL-MCNC: 0.4 MG/DL
BILIRUB UR QL STRIP: NEGATIVE
BUN SERPL-MCNC: 20 MG/DL
CALCIUM SERPL-MCNC: 9.2 MG/DL
CHLORIDE SERPL-SCNC: 105 MMOL/L
CLARITY UR REFRACT.AUTO: CLEAR
CO2 SERPL-SCNC: 28 MMOL/L
COLOR UR AUTO: YELLOW
CREAT SERPL-MCNC: 1.35 MG/DL
DIFFERENTIAL METHOD: ABNORMAL
EOSINOPHIL # BLD AUTO: 0.2 K/UL
EOSINOPHIL NFR BLD: 1.4 %
ERYTHROCYTE [DISTWIDTH] IN BLOOD BY AUTOMATED COUNT: 14.3 %
EST. GFR  (AFRICAN AMERICAN): 48.2 ML/MIN/1.73 M^2
EST. GFR  (NON AFRICAN AMERICAN): 41.8 ML/MIN/1.73 M^2
GLUCOSE SERPL-MCNC: 115 MG/DL
GLUCOSE UR QL STRIP: NEGATIVE
HCT VFR BLD AUTO: 34.3 %
HGB BLD-MCNC: 11.1 G/DL
HGB UR QL STRIP: ABNORMAL
KETONES UR QL STRIP: NEGATIVE
LEUKOCYTE ESTERASE UR QL STRIP: ABNORMAL
LIPASE SERPL-CCNC: 178 U/L
LYMPHOCYTES # BLD AUTO: 1.3 K/UL
LYMPHOCYTES NFR BLD: 12.6 %
MCH RBC QN AUTO: 30.1 PG
MCHC RBC AUTO-ENTMCNC: 32.4 G/DL
MCV RBC AUTO: 93 FL
MICROSCOPIC COMMENT: ABNORMAL
MONOCYTES # BLD AUTO: 0.6 K/UL
MONOCYTES NFR BLD: 5.5 %
NEUTROPHILS # BLD AUTO: 8.5 K/UL
NEUTROPHILS NFR BLD: 79.9 %
NITRITE UR QL STRIP: NEGATIVE
PH UR STRIP: 6 [PH] (ref 5–8)
PLATELET # BLD AUTO: 214 K/UL
PMV BLD AUTO: 10 FL
POTASSIUM SERPL-SCNC: 4.2 MMOL/L
PROT SERPL-MCNC: 7.5 G/DL
PROT UR QL STRIP: NEGATIVE
RBC # BLD AUTO: 3.69 M/UL
RBC #/AREA URNS AUTO: 3 /HPF (ref 0–4)
SODIUM SERPL-SCNC: 140 MMOL/L
SP GR UR STRIP: 1.01 (ref 1–1.03)
SQUAMOUS #/AREA URNS AUTO: ABNORMAL /HPF
URN SPEC COLLECT METH UR: ABNORMAL
UROBILINOGEN UR STRIP-ACNC: NEGATIVE EU/DL
WBC # BLD AUTO: 10.65 K/UL
WBC #/AREA URNS AUTO: 8 /HPF (ref 0–5)

## 2018-09-30 PROCEDURE — 25000003 PHARM REV CODE 250: Performed by: FAMILY MEDICINE

## 2018-09-30 PROCEDURE — 96374 THER/PROPH/DIAG INJ IV PUSH: CPT

## 2018-09-30 PROCEDURE — 80053 COMPREHEN METABOLIC PANEL: CPT

## 2018-09-30 PROCEDURE — 85025 COMPLETE CBC W/AUTO DIFF WBC: CPT

## 2018-09-30 PROCEDURE — 99284 EMERGENCY DEPT VISIT MOD MDM: CPT | Mod: 25

## 2018-09-30 PROCEDURE — 96361 HYDRATE IV INFUSION ADD-ON: CPT

## 2018-09-30 PROCEDURE — 63600175 PHARM REV CODE 636 W HCPCS: Performed by: FAMILY MEDICINE

## 2018-09-30 PROCEDURE — 81000 URINALYSIS NONAUTO W/SCOPE: CPT

## 2018-09-30 PROCEDURE — 96375 TX/PRO/DX INJ NEW DRUG ADDON: CPT

## 2018-09-30 PROCEDURE — 83690 ASSAY OF LIPASE: CPT

## 2018-09-30 RX ORDER — ONDANSETRON 2 MG/ML
4 INJECTION INTRAMUSCULAR; INTRAVENOUS
Status: COMPLETED | OUTPATIENT
Start: 2018-09-30 | End: 2018-09-30

## 2018-09-30 RX ORDER — KETOROLAC TROMETHAMINE 30 MG/ML
15 INJECTION, SOLUTION INTRAMUSCULAR; INTRAVENOUS
Status: COMPLETED | OUTPATIENT
Start: 2018-09-30 | End: 2018-09-30

## 2018-09-30 RX ORDER — NITROFURANTOIN 25; 75 MG/1; MG/1
100 CAPSULE ORAL 2 TIMES DAILY
Qty: 10 CAPSULE | Refills: 0 | Status: SHIPPED | OUTPATIENT
Start: 2018-09-30 | End: 2018-10-05

## 2018-09-30 RX ORDER — TAMSULOSIN HYDROCHLORIDE 0.4 MG/1
0.4 CAPSULE ORAL DAILY
Qty: 10 CAPSULE | Refills: 0 | Status: ON HOLD | OUTPATIENT
Start: 2018-09-30 | End: 2018-10-05 | Stop reason: SDUPTHER

## 2018-09-30 RX ORDER — OXYCODONE AND ACETAMINOPHEN 5; 325 MG/1; MG/1
1 TABLET ORAL EVERY 4 HOURS PRN
Qty: 18 TABLET | Refills: 0 | Status: ON HOLD | OUTPATIENT
Start: 2018-09-30 | End: 2018-10-05 | Stop reason: SDUPTHER

## 2018-09-30 RX ADMIN — ONDANSETRON HYDROCHLORIDE 4 MG: 2 SOLUTION INTRAMUSCULAR; INTRAVENOUS at 08:09

## 2018-09-30 RX ADMIN — SODIUM CHLORIDE 500 ML: 0.9 INJECTION, SOLUTION INTRAVENOUS at 08:09

## 2018-09-30 RX ADMIN — KETOROLAC TROMETHAMINE 15 MG: 30 INJECTION, SOLUTION INTRAMUSCULAR at 08:09

## 2018-09-30 NOTE — ED PROVIDER NOTES
Encounter Date: 9/30/2018       History     Chief Complaint   Patient presents with    Abdominal Pain     left lower abd pain that radiates to back that started this morning    Vomiting    Nausea     62-year-old female patient with side pain and lower abdominal pain on the left-hand side patient otherwise has nausea no vomiting no fever chills or night sweats no diarrhea.          Review of patient's allergies indicates:   Allergen Reactions    Lisinopril Edema     Past Medical History:   Diagnosis Date    Arthritis     Cardiomyopathy     Coronary artery disease     GERD (gastroesophageal reflux disease)     Hypertension      Past Surgical History:   Procedure Laterality Date    ARTHROPLASTY-KNEE Right 3/14/2016    [facility] at [facility] OR    ARTHROSCOPY-KNEE Right 8/27/2014    [facility] by [facility] at [facility] OR    CHOLECYSTECTOMY      COLONOSCOPY N/A 2/1/2016    Procedure: COLONOSCOPY;  Surgeon: Aidan Reynoso Jr., MD;  Location: [facility];  Service: Endoscopy;  Laterality: N/A;    COLONOSCOPY N/A 2/1/2016    Performed by Aidan Reynoso Jr., MD at [facility] ENDO    ESOPHAGOGASTRODUODENOSCOPY (EGD) N/A 2/1/2016    Performed by Aidan Reynoso Jr., MD at [facility] ENDO    HYSTERECTOMY      IOVERA Right 3/9/2016    [facility] by [facility] at [facility] OR    IOVERA Right 9/16/2015    [facility] at [facility] OR    JOINT REPLACEMENT Right     TKA    KNEE ARTHROSCOPY      LARYNGOSCOPY BRONCHOSCOPY-DIRECT  12/2/2015    Performed by [facility] at [facility] OR 2ND FLR    MENISCECTOMY Right 8/27/2014    [facility] by [facility] at [facility] OR    OTHER SURGICAL HISTORY      PARATHYROIDECTOMY      PARATHYROIDECTOMY N/A 12/2/2015    Performed by [facility]ep [facility] at [facility] OR 2ND FLR    TONSILLECTOMY      TRANSESOPHAGEAL ECHOCARDIOGRAM (DEVAN) N/A 8/17/2016    Performed by Chan Solorio MD at [facility] OR     Family History   Problem Relation Age of Onset     Diabetes Father     Kidney disease Father     Heart failure Mother     Heart disease Mother      Social History     Tobacco Use    Smoking status: Never Smoker    Smokeless tobacco: Never Used   Substance Use Topics    Alcohol use: No     Alcohol/week: 0.0 oz    Drug use: No     Review of Systems   Constitutional: Negative for fever.   HENT: Negative for sore throat.    Respiratory: Negative for shortness of breath.    Cardiovascular: Negative for chest pain.   Gastrointestinal: Positive for abdominal pain. Negative for nausea.   Genitourinary: Negative for dysuria.   Musculoskeletal: Negative for back pain.   Skin: Negative for rash.   Neurological: Negative for weakness.   Hematological: Does not bruise/bleed easily.   All other systems reviewed and are negative.      Physical Exam     Initial Vitals [09/30/18 0746]   BP Pulse Resp Temp SpO2   (!) 174/87 97 20 98.4 °F (36.9 °C) 98 %      MAP       --         Physical Exam    Nursing note and vitals reviewed.  Constitutional: She appears well-developed.   HENT:   Head: Normocephalic and atraumatic.   Right Ear: External ear normal.   Left Ear: External ear normal.   Nose: Nose normal.   Mouth/Throat: Oropharynx is clear and moist.   Eyes: Conjunctivae and EOM are normal. Pupils are equal, round, and reactive to light. Right eye exhibits no discharge. Left eye exhibits no discharge.   Neck: Normal range of motion. Neck supple. No tracheal deviation present.   Cardiovascular: Normal rate, regular rhythm and normal heart sounds.   No murmur heard.  Pulmonary/Chest: Breath sounds normal. No respiratory distress. She has no wheezes.   Abdominal: Soft. Bowel sounds are normal. There is tenderness.   Neurological: She is alert and oriented to person, place, and time.   Skin: Skin is warm and dry.         ED Course   Procedures  Labs Reviewed   CBC W/ AUTO DIFFERENTIAL - Abnormal; Notable for the following components:       Result Value    RBC 3.69 (*)      Hemoglobin 11.1 (*)     Hematocrit 34.3 (*)     Gran # (ANC) 8.5 (*)     Gran% 79.9 (*)     Lymph% 12.6 (*)     All other components within normal limits   COMPREHENSIVE METABOLIC PANEL - Abnormal; Notable for the following components:    Glucose 115 (*)     BUN, Bld 20 (*)     Anion Gap 7 (*)     eGFR if  48.2 (*)     eGFR if non  41.8 (*)     All other components within normal limits   URINALYSIS, REFLEX TO URINE CULTURE - Abnormal; Notable for the following components:    Occult Blood UA 1+ (*)     Leukocytes, UA 2+ (*)     All other components within normal limits    Narrative:     Preferred Collection Type->Urine, Clean Catch   LIPASE   URINALYSIS MICROSCOPIC          Imaging Results          CT Renal Stone Study ABD Pelvis WO (In process)                  Medical Decision Making:   Initial Assessment:   Patient in moderate distress and no other complains    Differential Diagnosis:   Appendicitis , constipation, diverticulitis, colitis, gastroenteritis                        Clinical Impression:   The encounter diagnosis was Calculus of kidney.                             Hunter Nelson MD  09/30/18 1047

## 2018-10-01 ENCOUNTER — OFFICE VISIT (OUTPATIENT)
Dept: UROLOGY | Facility: CLINIC | Age: 63
End: 2018-10-01
Payer: MEDICAID

## 2018-10-01 ENCOUNTER — TELEPHONE (OUTPATIENT)
Dept: UROLOGY | Facility: CLINIC | Age: 63
End: 2018-10-01

## 2018-10-01 VITALS
HEART RATE: 96 BPM | BODY MASS INDEX: 35.68 KG/M2 | SYSTOLIC BLOOD PRESSURE: 158 MMHG | HEIGHT: 64 IN | WEIGHT: 209 LBS | TEMPERATURE: 98 F | DIASTOLIC BLOOD PRESSURE: 73 MMHG

## 2018-10-01 DIAGNOSIS — N20.1 LEFT URETERAL STONE: Primary | ICD-10-CM

## 2018-10-01 DIAGNOSIS — N20.0 KIDNEY STONES: Primary | ICD-10-CM

## 2018-10-01 LAB
BACTERIA #/AREA URNS AUTO: NORMAL /HPF
BILIRUB UR QL STRIP: NEGATIVE
CLARITY UR REFRACT.AUTO: ABNORMAL
COLOR UR AUTO: YELLOW
GLUCOSE UR QL STRIP: NEGATIVE
HGB UR QL STRIP: NEGATIVE
KETONES UR QL STRIP: NEGATIVE
LEUKOCYTE ESTERASE UR QL STRIP: ABNORMAL
MICROSCOPIC COMMENT: NORMAL
NITRITE UR QL STRIP: NEGATIVE
PH UR STRIP: 5 [PH] (ref 5–8)
PROT UR QL STRIP: NEGATIVE
RBC #/AREA URNS AUTO: 1 /HPF (ref 0–4)
SP GR UR STRIP: 1.02 (ref 1–1.03)
SQUAMOUS #/AREA URNS AUTO: 8 /HPF
URN SPEC COLLECT METH UR: ABNORMAL
UROBILINOGEN UR STRIP-ACNC: NEGATIVE EU/DL
WBC #/AREA URNS AUTO: 1 /HPF (ref 0–5)

## 2018-10-01 PROCEDURE — 81001 URINALYSIS AUTO W/SCOPE: CPT

## 2018-10-01 PROCEDURE — 99204 OFFICE O/P NEW MOD 45 MIN: CPT | Mod: S$PBB,,, | Performed by: UROLOGY

## 2018-10-01 PROCEDURE — 99999 PR PBB SHADOW E&M-EST. PATIENT-LVL III: CPT | Mod: PBBFAC,,, | Performed by: UROLOGY

## 2018-10-01 PROCEDURE — 99213 OFFICE O/P EST LOW 20 MIN: CPT | Mod: PBBFAC | Performed by: UROLOGY

## 2018-10-01 PROCEDURE — 87086 URINE CULTURE/COLONY COUNT: CPT

## 2018-10-01 RX ORDER — SULFAMETHOXAZOLE AND TRIMETHOPRIM 800; 160 MG/1; MG/1
1 TABLET ORAL 2 TIMES DAILY
Qty: 14 TABLET | Refills: 0 | Status: SHIPPED | OUTPATIENT
Start: 2018-10-01 | End: 2018-10-08

## 2018-10-01 NOTE — H&P (VIEW-ONLY)
Subjective:      Patient ID: Christina Tomlinson is a 62 y.o. female.    Chief Complaint: Nephrolithiasis (left side)    Patient is a 62 y.o. female who is new to our clinic and referred by the ED, Dr. Nelson for evaluation of kidney stones.     HPI    Urolithiasis  Patient complains of left flank pain with radiation to the abdomen. Onset of symptoms was abrupt starting a few days ago with unchanged course since that time. Patient describes the pain as sharp, intermittent and rated as 8 / 10 at onset but currently a 6/10. The patient has had nausea and vomiting and no diaphoresis. There has been no fever or chills. The patient is not complaining of dysuria or frequency. Risk factors for urolithiasis: history of stone disease.      Review of Systems   All other systems reviewed and negative except pertinent positives noted in HPI.    Objective:     Physical Exam   Constitutional: She is oriented to person, place, and time. She appears well-developed and well-nourished. She is cooperative.  Non-toxic appearance.   HENT:   Head: Normocephalic.   Cardiovascular: Normal rate, intact distal pulses and normal pulses.    Pulmonary/Chest: Effort normal. No accessory muscle usage. No tachypnea. No respiratory distress.   Abdominal: Soft. Normal appearance. She exhibits no distension, no fluid wave, no ascites and no mass. There is tenderness in the left lower quadrant. There is CVA tenderness. There is no rebound. No hernia.   Liver/spleen non-palpable   Musculoskeletal: Normal range of motion.   Neurological: She is alert and oriented to person, place, and time. She has normal strength.   Skin: No bruising and no rash noted.     Psychiatric: She has a normal mood and affect. Her speech is normal and behavior is normal. Thought content normal.     Assessment:     1. Kidney stones      Plan:     1. Kidney stones:  -General risk factors for kidney stones and the conservative measures to prevent kidney stones in the future were  discussed with the patient in detail.  The patient was encouraged to drink 2-3 liters of water a day, limit iced tea and cas as well as foods high in oxalate.  They were cautioned to try to limit salt and red meat intake.  We also discussed adding citrate to the diet with the addition of jeri or lemon juice to their water or alternatively with crystal light.   -CT scan was independently reviewed today and reveals 10 x 10 x 10mm left UPJ stone as well as a 4mm stone just distal to this. HU of primary stone, 613-axial/665-coronal.  SSD 14.7cm.  -discussed primary treatment options including shockwave lithotripsy and ureteroscopy.  I explained that 1 reservation free using shockwave lithotripsy would be that she has a stone slightly distal to the larger stone which may serve as a distal obstruction for any stone fragments created during the shockwave lithotripsy.  We discussed the risks as well as benefits and alternatives to each.  The patient would like to proceed with ureteroscopy.  I have explained the indication, risks, benefits, and alternatives of the procedure in detail.  Risks including but not limited to bleeding, infection, injury to the urethra, bladder, ureter, need for additional treatments, inability or incomplete removal of kidney stones, pain, and discomfort related to the stent were discussed in depth with the patient.  The patient voices understanding and all questions have been answered.  The patient agrees to proceed as planned with left ureteroscopy, laser lithotripsy, and ureteral stent placement.

## 2018-10-02 LAB — BACTERIA UR CULT: NO GROWTH

## 2018-10-04 ENCOUNTER — ANESTHESIA EVENT (OUTPATIENT)
Dept: SURGERY | Facility: HOSPITAL | Age: 63
End: 2018-10-04
Payer: MEDICAID

## 2018-10-04 ENCOUNTER — TELEPHONE (OUTPATIENT)
Dept: UROLOGY | Facility: CLINIC | Age: 63
End: 2018-10-04

## 2018-10-04 NOTE — TELEPHONE ENCOUNTER
Called pt to confirm 8:00am arrival time for procedure. Gave pt NPO instructions and gave pt opportunity to ask questions. Pt verbalized understanding.

## 2018-10-05 ENCOUNTER — HOSPITAL ENCOUNTER (OUTPATIENT)
Facility: HOSPITAL | Age: 63
Discharge: HOME | End: 2018-10-05
Attending: UROLOGY | Admitting: UROLOGY
Payer: MEDICAID

## 2018-10-05 ENCOUNTER — ANESTHESIA (OUTPATIENT)
Dept: SURGERY | Facility: HOSPITAL | Age: 63
End: 2018-10-05
Payer: MEDICAID

## 2018-10-05 VITALS
WEIGHT: 206 LBS | HEART RATE: 82 BPM | BODY MASS INDEX: 35.17 KG/M2 | DIASTOLIC BLOOD PRESSURE: 63 MMHG | SYSTOLIC BLOOD PRESSURE: 136 MMHG | TEMPERATURE: 97 F | RESPIRATION RATE: 18 BRPM | HEIGHT: 64 IN | OXYGEN SATURATION: 97 %

## 2018-10-05 DIAGNOSIS — N20.9 UROLITHIASIS: Primary | ICD-10-CM

## 2018-10-05 DIAGNOSIS — N20.1 URETERAL STONE: ICD-10-CM

## 2018-10-05 PROCEDURE — 00918 ANES TRURL PX URTRL CAL RMVL: CPT | Performed by: UROLOGY

## 2018-10-05 PROCEDURE — 25500020 PHARM REV CODE 255: Performed by: UROLOGY

## 2018-10-05 PROCEDURE — 63600175 PHARM REV CODE 636 W HCPCS: Performed by: STUDENT IN AN ORGANIZED HEALTH CARE EDUCATION/TRAINING PROGRAM

## 2018-10-05 PROCEDURE — 27201423 OPTIME MED/SURG SUP & DEVICES STERILE SUPPLY: Performed by: UROLOGY

## 2018-10-05 PROCEDURE — C1758 CATHETER, URETERAL: HCPCS | Performed by: UROLOGY

## 2018-10-05 PROCEDURE — 71000015 HC POSTOP RECOV 1ST HR: Performed by: UROLOGY

## 2018-10-05 PROCEDURE — 37000009 HC ANESTHESIA EA ADD 15 MINS: Performed by: UROLOGY

## 2018-10-05 PROCEDURE — 63600175 PHARM REV CODE 636 W HCPCS: Performed by: NURSE ANESTHETIST, CERTIFIED REGISTERED

## 2018-10-05 PROCEDURE — 71000044 HC DOSC ROUTINE RECOVERY FIRST HOUR: Performed by: UROLOGY

## 2018-10-05 PROCEDURE — 25000003 PHARM REV CODE 250: Performed by: NURSE ANESTHETIST, CERTIFIED REGISTERED

## 2018-10-05 PROCEDURE — 36000706: Performed by: UROLOGY

## 2018-10-05 PROCEDURE — 37000008 HC ANESTHESIA 1ST 15 MINUTES: Performed by: UROLOGY

## 2018-10-05 PROCEDURE — D9220A PRA ANESTHESIA: Mod: CRNA,,, | Performed by: NURSE ANESTHETIST, CERTIFIED REGISTERED

## 2018-10-05 PROCEDURE — 36000707: Performed by: UROLOGY

## 2018-10-05 PROCEDURE — 52356 CYSTO/URETERO W/LITHOTRIPSY: CPT | Mod: LT,,, | Performed by: UROLOGY

## 2018-10-05 PROCEDURE — 76000 FLUOROSCOPY <1 HR PHYS/QHP: CPT | Mod: 26,59,, | Performed by: UROLOGY

## 2018-10-05 PROCEDURE — 82365 CALCULUS SPECTROSCOPY: CPT

## 2018-10-05 PROCEDURE — C2617 STENT, NON-COR, TEM W/O DEL: HCPCS | Performed by: UROLOGY

## 2018-10-05 PROCEDURE — D9220A PRA ANESTHESIA: Mod: ANES,,, | Performed by: ANESTHESIOLOGY

## 2018-10-05 PROCEDURE — 25000003 PHARM REV CODE 250: Performed by: STUDENT IN AN ORGANIZED HEALTH CARE EDUCATION/TRAINING PROGRAM

## 2018-10-05 DEVICE — STENT URETERAL UNIV 6FR 24CM: Type: IMPLANTABLE DEVICE | Site: URETER | Status: FUNCTIONAL

## 2018-10-05 RX ORDER — OXYCODONE AND ACETAMINOPHEN 5; 325 MG/1; MG/1
1 TABLET ORAL EVERY 4 HOURS PRN
Qty: 11 TABLET | Refills: 0 | Status: SHIPPED | OUTPATIENT
Start: 2018-10-05 | End: 2019-01-08

## 2018-10-05 RX ORDER — TAMSULOSIN HYDROCHLORIDE 0.4 MG/1
0.4 CAPSULE ORAL DAILY
Qty: 15 CAPSULE | Refills: 0 | Status: SHIPPED | OUTPATIENT
Start: 2018-10-05 | End: 2019-01-08

## 2018-10-05 RX ORDER — TAMSULOSIN HYDROCHLORIDE 0.4 MG/1
0.4 CAPSULE ORAL DAILY
Qty: 30 CAPSULE | Refills: 0 | Status: SHIPPED | OUTPATIENT
Start: 2018-10-05 | End: 2019-01-08

## 2018-10-05 RX ORDER — HYDROMORPHONE HYDROCHLORIDE 1 MG/ML
0.2 INJECTION, SOLUTION INTRAMUSCULAR; INTRAVENOUS; SUBCUTANEOUS EVERY 5 MIN PRN
Status: DISCONTINUED | OUTPATIENT
Start: 2018-10-05 | End: 2018-10-05 | Stop reason: HOSPADM

## 2018-10-05 RX ORDER — ONDANSETRON 8 MG/1
8 TABLET, ORALLY DISINTEGRATING ORAL EVERY 8 HOURS PRN
Status: DISCONTINUED | OUTPATIENT
Start: 2018-10-05 | End: 2018-10-05 | Stop reason: HOSPADM

## 2018-10-05 RX ORDER — LIDOCAINE HCL/PF 100 MG/5ML
SYRINGE (ML) INTRAVENOUS
Status: DISCONTINUED | OUTPATIENT
Start: 2018-10-05 | End: 2018-10-05

## 2018-10-05 RX ORDER — MIDAZOLAM HYDROCHLORIDE 1 MG/ML
INJECTION INTRAMUSCULAR; INTRAVENOUS
Status: DISCONTINUED | OUTPATIENT
Start: 2018-10-05 | End: 2018-10-05

## 2018-10-05 RX ORDER — SODIUM CHLORIDE 9 MG/ML
INJECTION, SOLUTION INTRAVENOUS CONTINUOUS
Status: DISCONTINUED | OUTPATIENT
Start: 2018-10-05 | End: 2018-10-05 | Stop reason: HOSPADM

## 2018-10-05 RX ORDER — ONDANSETRON HYDROCHLORIDE 2 MG/ML
INJECTION, SOLUTION INTRAMUSCULAR; INTRAVENOUS
Status: DISCONTINUED | OUTPATIENT
Start: 2018-10-05 | End: 2018-10-05

## 2018-10-05 RX ORDER — BRIMONIDINE TARTRATE 2 MG/ML
1 SOLUTION/ DROPS OPHTHALMIC EVERY 8 HOURS
COMMUNITY
End: 2019-01-08

## 2018-10-05 RX ORDER — ROCURONIUM BROMIDE 10 MG/ML
INJECTION, SOLUTION INTRAVENOUS
Status: DISCONTINUED | OUTPATIENT
Start: 2018-10-05 | End: 2018-10-05

## 2018-10-05 RX ORDER — PROPOFOL 10 MG/ML
VIAL (ML) INTRAVENOUS
Status: DISCONTINUED | OUTPATIENT
Start: 2018-10-05 | End: 2018-10-05

## 2018-10-05 RX ORDER — FENTANYL CITRATE 50 UG/ML
INJECTION, SOLUTION INTRAMUSCULAR; INTRAVENOUS
Status: DISCONTINUED | OUTPATIENT
Start: 2018-10-05 | End: 2018-10-05

## 2018-10-05 RX ORDER — FENTANYL CITRATE 50 UG/ML
INJECTION, SOLUTION INTRAMUSCULAR; INTRAVENOUS
Status: DISCONTINUED
Start: 2018-10-05 | End: 2018-10-05 | Stop reason: HOSPADM

## 2018-10-05 RX ORDER — NEOSTIGMINE METHYLSULFATE 1 MG/ML
INJECTION, SOLUTION INTRAVENOUS
Status: DISCONTINUED | OUTPATIENT
Start: 2018-10-05 | End: 2018-10-05

## 2018-10-05 RX ORDER — GLYCOPYRROLATE 0.2 MG/ML
INJECTION INTRAMUSCULAR; INTRAVENOUS
Status: DISCONTINUED | OUTPATIENT
Start: 2018-10-05 | End: 2018-10-05

## 2018-10-05 RX ORDER — SODIUM CHLORIDE 0.9 % (FLUSH) 0.9 %
3 SYRINGE (ML) INJECTION
Status: DISCONTINUED | OUTPATIENT
Start: 2018-10-05 | End: 2018-10-05 | Stop reason: HOSPADM

## 2018-10-05 RX ORDER — CEFAZOLIN SODIUM 1 G/3ML
2 INJECTION, POWDER, FOR SOLUTION INTRAMUSCULAR; INTRAVENOUS
Status: COMPLETED | OUTPATIENT
Start: 2018-10-05 | End: 2018-10-05

## 2018-10-05 RX ADMIN — GLYCOPYRROLATE 0.6 MG: 0.2 INJECTION, SOLUTION INTRAMUSCULAR; INTRAVENOUS at 10:10

## 2018-10-05 RX ADMIN — ROCURONIUM BROMIDE 30 MG: 10 INJECTION, SOLUTION INTRAVENOUS at 09:10

## 2018-10-05 RX ADMIN — FENTANYL CITRATE 25 MCG: 50 INJECTION, SOLUTION INTRAMUSCULAR; INTRAVENOUS at 10:10

## 2018-10-05 RX ADMIN — NEOSTIGMINE METHYLSULFATE 5 MG: 1 INJECTION INTRAVENOUS at 10:10

## 2018-10-05 RX ADMIN — CEFAZOLIN 2 G: 330 INJECTION, POWDER, FOR SOLUTION INTRAMUSCULAR; INTRAVENOUS at 09:10

## 2018-10-05 RX ADMIN — ONDANSETRON 4 MG: 2 INJECTION, SOLUTION INTRAMUSCULAR; INTRAVENOUS at 10:10

## 2018-10-05 RX ADMIN — FENTANYL CITRATE 50 MCG: 50 INJECTION, SOLUTION INTRAMUSCULAR; INTRAVENOUS at 09:10

## 2018-10-05 RX ADMIN — PROPOFOL 50 MG: 10 INJECTION, EMULSION INTRAVENOUS at 09:10

## 2018-10-05 RX ADMIN — MIDAZOLAM HYDROCHLORIDE 2 MG: 1 INJECTION, SOLUTION INTRAMUSCULAR; INTRAVENOUS at 09:10

## 2018-10-05 RX ADMIN — SODIUM CHLORIDE: 0.9 INJECTION, SOLUTION INTRAVENOUS at 08:10

## 2018-10-05 RX ADMIN — SODIUM CHLORIDE, SODIUM GLUCONATE, SODIUM ACETATE, POTASSIUM CHLORIDE, MAGNESIUM CHLORIDE, SODIUM PHOSPHATE, DIBASIC, AND POTASSIUM PHOSPHATE: .53; .5; .37; .037; .03; .012; .00082 INJECTION, SOLUTION INTRAVENOUS at 09:10

## 2018-10-05 RX ADMIN — LIDOCAINE HYDROCHLORIDE 80 MG: 20 INJECTION, SOLUTION INTRAVENOUS at 09:10

## 2018-10-05 RX ADMIN — PROPOFOL 150 MG: 10 INJECTION, EMULSION INTRAVENOUS at 09:10

## 2018-10-05 NOTE — ANESTHESIA PREPROCEDURE EVALUATION
10/05/2018  Christina Tomlinson is a 62 y.o., female.  Pre-operative evaluation for Procedure(s) (LRB):  URETEROSCOPY (Left)  LITHOTRIPSY, USING LASER (Left)  PLACEMENT-STENT (Left)  CYSTOSCOPY  PYELOGRAM, RETROGRADE (Left)  EXTRACTION - STONE (Left)    Christina Tomlinson is a 62 y.o. female     Patient Active Problem List   Diagnosis    Corns and callosities    Dermatophytosis of nail    Pain in limb    Sore throat    Torn rotator cuff    Breast cancer screening    Dysphagia, oropharyngeal    Dysphagia, unspecified(787.20)    Medial meniscus tear    Arthritis of knee    Arthritis of hand    Back pain of thoracolumbar region    GERD (gastroesophageal reflux disease)    Primary osteoarthritis of right knee    Acute rheumatoid arthritis    Primary hyperparathyroidism    Anemia    Screening for colorectal cancer    Essential hypertension    Anemia of chronic disease    Knee pain    S/P total knee arthroplasty    Decreased ROM of right knee    Weakness of right lower extremity    Difficulty walking    Decreased functional mobility    Cardiomyopathy due to hypertension, with heart failure    Cardiomyopathy    Coronary artery disease involving native coronary artery of native heart with angina pectoris    Moderate mitral insufficiency    Urolithiasis       Review of patient's allergies indicates:   Allergen Reactions    Lisinopril Edema       No current facility-administered medications on file prior to encounter.      Current Outpatient Medications on File Prior to Encounter   Medication Sig Dispense Refill    aspirin (ECOTRIN) 81 MG EC tablet Take 1 tablet (81 mg total) by mouth once. 30 tablet 11    atorvastatin (LIPITOR) 40 MG tablet TAKE ONE TABLET BY MOUTH ONCE DAILY 90 tablet 3    brimonidine 0.2% (ALPHAGAN) 0.2 % Drop 1 drop every 8 (eight) hours.      cholecalciferol,  vitamin D3, 3,000 unit Tab Take by mouth.      ferrous gluconate (FERGON) 240 (27 FE) MG tablet Take 480 mg by mouth 3 (three) times daily.      folic acid (FOLVITE) 800 MCG Tab Take 800 mcg by mouth once daily.      furosemide (LASIX) 40 MG tablet TAKE ONE TABLET BY MOUTH ONCE DAILY 90 tablet 3    hydroxychloroquine (PLAQUENIL) 200 mg tablet Take 200 mg by mouth 2 (two) times daily.       methotrexate 2.5 MG Tab Take 15 mg by mouth. Takes 7 tablets on tuesday      metoprolol succinate (TOPROL-XL) 50 MG 24 hr tablet TAKE ONE TABLET BY MOUTH ONCE DAILY 90 tablet 3    nitrofurantoin, macrocrystal-monohydrate, (MACROBID) 100 MG capsule Take 1 capsule (100 mg total) by mouth 2 (two) times daily. for 5 days 10 capsule 0    pantoprazole (PROTONIX) 40 MG tablet Take 1 tablet (40 mg total) by mouth once daily. 30 tablet 11    sulfamethoxazole-trimethoprim 800-160mg (BACTRIM DS) 800-160 mg Tab Take 1 tablet by mouth 2 (two) times daily. for 7 days 14 tablet 0    tamsulosin (FLOMAX) 0.4 mg Cap Take 1 capsule (0.4 mg total) by mouth once daily. 10 capsule 0    acetaminophen (TYLENOL) 500 MG tablet Take 500 mg by mouth every 6 (six) hours as needed for Pain.      losartan (COZAAR) 50 MG tablet Take 1 tablet (50 mg total) by mouth once daily. 90 tablet 3    methylPREDNISolone (MEDROL) 4 MG Tab Take 4 mg by mouth once daily.      omeprazole (PRILOSEC) 20 MG capsule Take 1 capsule (20 mg total) by mouth once daily. 30 capsule 0    oxyCODONE-acetaminophen (PERCOCET) 5-325 mg per tablet Take 1 tablet by mouth every 4 (four) hours as needed for Pain. 18 tablet 0       Past Surgical History:   Procedure Laterality Date    ARTHROPLASTY-KNEE Right 3/14/2016    [facility]formed [facility] OR    ARTHROSCOPY-KNEE Right 8/27/2014    [facility] at [facility] OR    CHOLECYSTECTOMY      COLONOSCOPY N/A 2/1/2016    Procedure: COLONOSCOPY;  Surgeon: Aidan Reynoso Jr., MD;  Location: [facility] ENDO;  Service:  Endoscopy;  Laterality: N/A;    COLONOSCOPY N/A 2/1/2016    Per[facility] by [facility]., [facility] at [facility] ENDO    ESOPHAGOGASTRODUODENOSCOPY (EGD) N/A 2/1/2016    Per[facility] by [facility] Jr., MD at [facility] ENDO    HYSTERECTOMY      IOVERA Right 3/9/2016    [facility] OR    IOVERA Right 9/16/2015    [facility] at [facility] OR    JOINT REPLACEMENT Right     TKA    KNEE ARTHROSCOPY      LARYNGOSCOPY BRONCHOSCOPY-DIRECT  12/2/2015    [facility] 2ND FLR    MENISCECTOMY Right 8/27/2014    [facility] OR    OTHER SURGICAL HISTORY      PARATHYROIDECTOMY      PARATHYROIDECTOMY N/A 12/2/2015    [facility] 2ND FLR    TONSILLECTOMY      TRANSESOPHAGEAL ECHOCARDIOGRAM (DEVAN) N/A 8/17/2016    [facility] by [facility] at [facility] OR       Social History     Socioeconomic History    Marital status:      Spouse name: Not on file    Number of children: Not on file    Years of education: Not on file    Highest education level: Not on file   Social Needs    Financial resource strain: Not on file    Food insecurity - worry: Not on file    Food insecurity - inability: Not on file    Transportation needs - medical: Not on file    Transportation needs - non-medical: Not on file   Occupational History    Not on file   Tobacco Use    Smoking status: Never Smoker    Smokeless tobacco: Never Used   Substance and Sexual Activity    Alcohol use: No     Alcohol/week: 0.0 oz    Drug use: No    Sexual activity: Not Currently   Other Topics Concern    Not on file   Social History Narrative    Not on file         CBC: No results for input(s): WBC, RBC, HGB, HCT, PLT, MCV, MCH, MCHC in the last 72 hours.    CMP: No results for input(s): NA, K, CL, CO2, BUN, CREATININE, GLU, MG, PHOS, CALCIUM, ALBUMIN, PROT, ALKPHOS, ALT, AST, BILITOT in the last 72 hours.    INR  No  results for input(s): PT, INR, PROTIME, APTT in the last 72 hours.        Diagnostic Studies:      EKD Echo:  Results for orders placed or performed during the hospital encounter of 17   2D Echo w/ Color Flow Doppler   Result Value Ref Range    EF 45 55 - 65    Mitral Valve Regurgitation MODERATE (A)     Diastolic Dysfunction No     Est. PA Systolic Pressure 18.68     Mitral Valve Mobility NORMAL     Tricuspid Valve Regurgitation TRIVIAL TO MILD          Anesthesia Evaluation    I have reviewed the Patient Summary Reports.     I have reviewed the Medications.     Review of Systems  Anesthesia Hx:  No problems with previous Anesthesia  History of prior surgery of interest to airway management or planning: Previous anesthesia: General Family Hx of Anesthesia complications:   Denies Personal Hx of Anesthesia complications.   Cardiovascular:   Exercise tolerance: good Hypertension CAD   Angina CHF    Hepatic/GI:   GERD        Physical Exam  General:  Well nourished    Airway/Jaw/Neck:  Airway Findings: Mouth Opening: Normal Tongue: Normal  General Airway Assessment: Adult  Mallampati: II  Improves to II with phonation.  TM Distance: Normal, at least 6 cm  Jaw/Neck Findings:  Neck ROM: Normal ROM      Dental:  Dental Findings: Upper Dentures, Lower Dentures   Chest/Lungs:  Chest/Lungs Findings: Clear to auscultation, Normal Respiratory Rate     Heart/Vascular:  Heart Findings: Rate: Normal  Rhythm: Regular Rhythm  Sounds: Normal        Mental Status:  Mental Status Findings:  Cooperative, Alert and Oriented         Anesthesia Plan  Type of Anesthesia, risks & benefits discussed:  Anesthesia Type:  general  Patient's Preference:   Intra-op Monitoring Plan: standard ASA monitors  Intra-op Monitoring Plan Comments:   Post Op Pain Control Plan: multimodal analgesia  Post Op Pain Control Plan Comments:   Induction:   IV  Beta Blocker:         Informed Consent: Patient understands risks and agrees with  Anesthesia plan.  Questions answered. Anesthesia consent signed with patient.  ASA Score: 3     Day of Surgery Review of History & Physical:    H&P update referred to the surgeon.         Ready For Surgery From Anesthesia Perspective.

## 2018-10-05 NOTE — DISCHARGE SUMMARY
[facility]  Discharge Note  Short Stay    Admit Date: 10/5/2018    Discharge Date and Time: 10/05/2018 11:12 AM      Attending Physician: Everette Quiroz MD     Discharge Provider: Jolanta Swanson    Diagnoses:  Active Hospital Problems    Diagnosis  POA    *Urolithiasis [N20.9]  Yes    Moderate mitral insufficiency [I34.0]  Yes    Coronary artery disease involving native coronary artery of native heart with angina pectoris [I25.119]  Yes    Cardiomyopathy due to hypertension, with heart failure [I11.0, I43]  Yes     L dominant system, LAD, D1, LCx patent, Ramus 60%, small RCA with 75% stenosis, EF 40% with LVEDP 28 mmHg  MR not well visualized, at least moderate      Decreased ROM of right knee [M25.661]  Yes    S/P total knee arthroplasty [Z96.659]  Not Applicable    Essential hypertension [I10]  Yes    Anemia of chronic disease [D63.8]  Yes    Primary hyperparathyroidism [E21.0]  Yes    GERD (gastroesophageal reflux disease) [K21.9]  Yes      Resolved Hospital Problems   No resolved problems to display.       Discharged Condition: good    Hospital Course: Patient was admitted for left ureteroscopy and tolerated the procedure well with no complications. The patient was discharged home in good condition on the same day.       Final Diagnoses: Same as principal problem.    Disposition: Home or Self Care    Follow up/Patient Instructions:    Medications:  Reconciled Home Medications:   Current Discharge Medication List      START taking these medications    Details   !! tamsulosin (FLOMAX) 0.4 mg Cap Take 1 capsule (0.4 mg total) by mouth once daily.  Qty: 30 capsule, Refills: 0       !! - Potential duplicate medications found. Please discuss with provider.      CONTINUE these medications which have CHANGED    Details   oxyCODONE-acetaminophen (PERCOCET) 5-325 mg per tablet Take 1 tablet by mouth every 4 (four) hours as needed for Pain.  Qty: 11 tablet, Refills: 0      !! tamsulosin (FLOMAX)  0.4 mg Cap Take 1 capsule (0.4 mg total) by mouth once daily.  Qty: 15 capsule, Refills: 0       !! - Potential duplicate medications found. Please discuss with provider.      CONTINUE these medications which have NOT CHANGED    Details   aspirin (ECOTRIN) 81 MG EC tablet Take 1 tablet (81 mg total) by mouth once.  Qty: 30 tablet, Refills: 11      atorvastatin (LIPITOR) 40 MG tablet TAKE ONE TABLET BY MOUTH ONCE DAILY  Qty: 90 tablet, Refills: 3      brimonidine 0.2% (ALPHAGAN) 0.2 % Drop 1 drop every 8 (eight) hours.      cholecalciferol, vitamin D3, 3,000 unit Tab Take by mouth.      ferrous gluconate (FERGON) 240 (27 FE) MG tablet Take 480 mg by mouth 3 (three) times daily.      folic acid (FOLVITE) 800 MCG Tab Take 800 mcg by mouth once daily.      furosemide (LASIX) 40 MG tablet TAKE ONE TABLET BY MOUTH ONCE DAILY  Qty: 90 tablet, Refills: 3    Comments: Please consider 90 day supplies to promote better adherence      hydroxychloroquine (PLAQUENIL) 200 mg tablet Take 200 mg by mouth 2 (two) times daily.       methotrexate 2.5 MG Tab Take 15 mg by mouth. Takes 7 tablets on tuesday      metoprolol succinate (TOPROL-XL) 50 MG 24 hr tablet TAKE ONE TABLET BY MOUTH ONCE DAILY  Qty: 90 tablet, Refills: 3    Comments: Please consider 90 day supplies to promote better adherence      nitrofurantoin, macrocrystal-monohydrate, (MACROBID) 100 MG capsule Take 1 capsule (100 mg total) by mouth 2 (two) times daily. for 5 days  Qty: 10 capsule, Refills: 0      pantoprazole (PROTONIX) 40 MG tablet Take 1 tablet (40 mg total) by mouth once daily.  Qty: 30 tablet, Refills: 11      sulfamethoxazole-trimethoprim 800-160mg (BACTRIM DS) 800-160 mg Tab Take 1 tablet by mouth 2 (two) times daily. for 7 days  Qty: 14 tablet, Refills: 0      acetaminophen (TYLENOL) 500 MG tablet Take 500 mg by mouth every 6 (six) hours as needed for Pain.      losartan (COZAAR) 50 MG tablet Take 1 tablet (50 mg total) by mouth once daily.  Qty: 90  tablet, Refills: 3      methylPREDNISolone (MEDROL) 4 MG Tab Take 4 mg by mouth once daily.      omeprazole (PRILOSEC) 20 MG capsule Take 1 capsule (20 mg total) by mouth once daily.  Qty: 30 capsule, Refills: 0           Discharge Procedure Orders   Diet general     Call MD for:  temperature >100.4     Call MD for:  persistent nausea and vomiting     Call MD for:  severe uncontrolled pain     No dressing needed     CYSTOSCOPY W/ STENT REMOVAL   Standing Status: Future Standing Exp. Date: 04/05/19     Activity as tolerated     Follow-up Information     Everette Quiroz MD In 2 weeks.    Specialty:  Urology  Why:  stent removal  Contact information:  [address] [facility] [address]  [phone]                     Jolanta Swanson MD  Urology, PGY- 4  Pager# [phone]

## 2018-10-05 NOTE — DISCHARGE INSTRUCTIONS
Shock Wave Lithotripsy  Passing a kidney stone can be very painful. Shock wave lithotripsy is a treatment that helps by breaking the kidney stone into smaller pieces that are easier to pass. This treatment is also called extracorporeal shock wave lithotripsy (ESWL). Lithotripsy takes about an hour. Its done in a hospital, lithotripsy center, or mobile lithotripsy van. You will likely go home the same day. This treatment is not used for all types of kidney stones. Your healthcare provider will discuss whether this is the right treatment for the type of stone you have.      Energy waves strike the stone, which begins to crack. The stone crumbles into tiny pieces.   During the procedure  · You get medicine to prevent pain and help you relax or sleep during lithotripsy. Once this takes effect, the procedure will start.  · A flexible tube (stent) with holes in it may be placed into your ureter, the tube that connects the kidney and the bladder. This helps keep urine flowing from the kidney.  · Your healthcare provider then uses X-ray or ultrasound to find the exact location of the kidney stone.  · Sound waves are aimed at the stone and sent at high speed. If youre awake, you may feel a tapping as they pass through your body.  After the procedure  · Youll be closely watched in a recovery room for about 1 to 3 hours. Antibiotics and pain medicine may be prescribed before you leave.  · Youll have a follow-up visit in a few weeks. If you received a stent, it will be removed. Your healthcare provider will also check for pieces of stone. If large pieces remain, you may need a second lithotripsy or another procedure.  Possible risks and complications  · Infection  · Bleeding in the kidney  · Bruising of the kidney or skin  · Blockage (obstruction) of the ureter  · Failure to break up the stone (other procedures may be needed)   Passing the stone  It can take a day to several weeks for the pieces of stone to leave your  body. Drink plenty of liquids to help flush your system. During this time:  · Your urine may be cloudy or slightly bloody. You may even see small pieces of stone.  · You may have a slight fever and some pain. Take prescribed or over-the-counter pain medicine as instructed by your healthcare provider.  · You may be asked to strain your urine to collect some stone particles. These will be studied in the lab.  When to call your healthcare provider   Call your healthcare provider right away if you have any of the following:  · Fever of 100.4°F (38°C) or higher, or as directed by your healthcare provider  · Heavy bleeding  · Pain that doesnt go away with medicine  · Upset stomach and vomiting  · Problems urinating       Cystoscopy    Cystoscopy is a procedure that lets your doctor look directly inside your urethra and bladder. It can be used to:  · Help diagnose a problem with your urethra, bladder, or kidneys.  · Take a sample (biopsy) of bladder or urethral tissue.  · Treat certain problems (such as removing kidney stones).  · Place a stent to bypass an obstruction.  · Take special X-rays of the kidneys.  Based on the findings, your doctor may recommend other tests or treatments.  What is a cystoscope?  A cystoscope is a telescope-like instrument that contains lenses and fiberoptics (small glass wires that make bright light). The cystoscope may be straight and rigid, or flexible to bend around curves in the urethra. The doctor may look directly into the cystoscope, or project the image onto a monitor.  Getting ready  · Ask your doctor if you should stop taking any medicines before the procedure.  · Ask whether you should avoid eating or drinking anything after midnight before the procedure.  · Follow any other instructions your doctor gives you.  Tell your doctor before the exam if you:  · Take any medicines, such as aspirin or blood thinners  · Have allergies to any medicines  · Are pregnant   The  procedure  Cystoscopy is done in the doctors office, surgery center, or hospital. The doctor and a nurse are present during the procedure. It takes only a few minutes, longer if a biopsy, X-ray, or treatment needs to be done.  During the procedure:  · You lie on an exam table on your back, knees bent and legs apart. You are covered with a drape.  · Your urethra and the area around it are washed. Anesthetic jelly may be applied to numb the urethra. Other pain medicine is usually not needed. In some cases, you may be offered a mild sedative to help you relax. If a more extensive procedure is to be done, such as a biopsy or kidney stone removal, general anesthesia may be needed.  · The cystoscope is inserted. A sterile fluid is put into the bladder to expand it. You may feel pressure from this fluid.  · When the procedure is done, the cystoscope is removed.  After the procedure  If you had a sedative, general anesthesia, or spinal anesthesia, you must have someone drive you home. Once youre home:  · Drink plenty of fluids.  · You may have burning or light bleeding when you urinate--this is normal.  · Medicines may be prescribed to ease any discomfort or prevent infection. Take these as directed.  · Call your doctor if you have heavy bleeding or blood clots, burning that lasts more than a day, a fever over 100°F  (38° C), or trouble urinating.      Discharge Instructions: After Your Surgery  Youve just had surgery. During surgery, you were given medicine called anesthesia to keep you relaxed and free of pain. After surgery, you may have some pain or nausea. This is common. Here are some tips for feeling better and getting well after surgery.     Stay on schedule with your medicine.   Going home  Your healthcare provider will show you how to take care of yourself when you go home. He or she will also answer your questions. Have an adult family member or friend drive you home. For the first 24 hours after your  surgery:  · Do not drive or use heavy equipment.  · Do not make important decisions or sign legal papers.  · Do not drink alcohol.  · Have someone stay with you, if needed. He or she can watch for problems and help keep you safe.  Be sure to go to all follow-up visits with your healthcare provider. And rest after your surgery for as long as your healthcare provider tells you to.  Coping with pain  If you have pain after surgery, pain medicine will help you feel better. Take it as told, before pain becomes severe. Also, ask your healthcare provider or pharmacist about other ways to control pain. This might be with heat, ice, or relaxation. And follow any other instructions your surgeon or nurse gives you.  Tips for taking pain medicine  To get the best relief possible, remember these points:  · Pain medicines can upset your stomach. Taking them with a little food may help.  · Most pain relievers taken by mouth need at least 20 to 30 minutes to start to work.  · Taking medicine on a schedule can help you remember to take it. Try to time your medicine so that you can take it before starting an activity. This might be before you get dressed, go for a walk, or sit down for dinner.  · Constipation is a common side effect of pain medicines. Call your healthcare provider before taking any medicines such as laxatives or stool softeners to help ease constipation. Also ask if you should skip any foods. Drinking lots of fluids and eating foods such as fruits and vegetables that are high in fiber can also help. Remember, do not take laxatives unless your surgeon has prescribed them.  · Drinking alcohol and taking pain medicine can cause dizziness and slow your breathing. It can even be deadly. Do not drink alcohol while taking pain medicine.  · Pain medicine can make you react more slowly to things. Do not drive or run machinery while taking pain medicine.  Your healthcare provider may tell you to take acetaminophen to help ease  your pain. Ask him or her how much you are supposed to take each day. Acetaminophen or other pain relievers may interact with your prescription medicines or other over-the-counter (OTC) medicines. Some prescription medicines have acetaminophen and other ingredients. Using both prescription and OTC acetaminophen for pain can cause you to overdose. Read the labels on your OTC medicines with care. This will help you to clearly know the list of ingredients, how much to take, and any warnings. It may also help you not take too much acetaminophen. If you have questions or do not understand the information, ask your pharmacist or healthcare provider to explain it to you before you take the OTC medicine.  Managing nausea  Some people have an upset stomach after surgery. This is often because of anesthesia, pain, or pain medicine, or the stress of surgery. These tips will help you handle nausea and eat healthy foods as you get better. If you were on a special food plan before surgery, ask your healthcare provider if you should follow it while you get better. These tips may help:  · Do not push yourself to eat. Your body will tell you when to eat and how much.  · Start off with clear liquids and soup. They are easier to digest.  · Next try semi-solid foods, such as mashed potatoes, applesauce, and gelatin, as you feel ready.  · Slowly move to solid foods. Dont eat fatty, rich, or spicy foods at first.  · Do not force yourself to have 3 large meals a day. Instead eat smaller amounts more often.  · Take pain medicines with a small amount of solid food, such as crackers or toast, to avoid nausea.     Call your surgeon if  · You still have pain an hour after taking medicine. The medicine may not be strong enough.  · You feel too sleepy, dizzy, or groggy. The medicine may be too strong.  · You have side effects like nausea, vomiting, or skin changes, such as rash, itching, or hives.       If you have obstructive sleep apnea  You  were given anesthesia medicine during surgery to keep you comfortable and free of pain. After surgery, you may have more apnea spells because of this medicine and other medicines you were given. The spells may last longer than usual.   At home:  · Keep using the continuous positive airway pressure (CPAP) device when you sleep. Unless your healthcare provider tells you not to, use it when you sleep, day or night. CPAP is a common device used to treat obstructive sleep apnea.  · Talk with your provider before taking any pain medicine, muscle relaxants, or sedatives. Your provider will tell you about the possible dangers of taking these medicines.  Date Last Reviewed: 12/1/2016  © 5632-1178 [employer]. [address] [place] [address], [place] [address]. All rights reserved. This information is not intended as a substitute for professional medical care. Always follow your healthcare professional's instructions.

## 2018-10-05 NOTE — PLAN OF CARE
Problem: Patient Care Overview  Goal: Plan of Care Review  Discharge instructions given and explained to pt and pt daughter. Both verbalized understanding. Pt meets criteria to be discharged home.

## 2018-10-05 NOTE — TRANSFER OF CARE
"Anesthesia Transfer of Care Note    Patient: Christina Tomlinson    Procedure(s) Performed: Procedure(s) (LRB):  URETEROSCOPY (Left)  LITHOTRIPSY, USING LASER (Left)  PLACEMENT-STENT (Left)  CYSTOSCOPY  PYELOGRAM, RETROGRADE (Left)  EXTRACTION - STONE (Left)    Patient location: PACU    Anesthesia Type: general    Transport from OR: Transported from OR on 6-10 L/min O2 by face mask with adequate spontaneous ventilation    Post pain: adequate analgesia    Post assessment: no apparent anesthetic complications and tolerated procedure well    Post vital signs: stable    Level of consciousness: awake    Nausea/Vomiting: no nausea/vomiting    Complications: none    Transfer of care protocol was followed      Last vitals:   Visit Vitals  BP (!) 164/94 (BP Location: Left arm, Patient Position: Lying)   Pulse 98   Temp 36.7 °C (98.1 °F) (Oral)   Resp 18   Ht 5' 4" (1.626 m)   Wt 93.4 kg (206 lb)   SpO2 100%   Breastfeeding? No   BMI 35.36 kg/m²     "

## 2018-10-05 NOTE — OP NOTE
[facility]  Operative Note    Date: 10/05/2018    Pre-Op Diagnosis:   1. Left renal stones    Patient Active Problem List   Diagnosis    Corns and callosities    Dermatophytosis of nail    Pain in limb    Sore throat    Torn rotator cuff    Breast cancer screening    Dysphagia, oropharyngeal    Dysphagia, unspecified(787.20)    Medial meniscus tear    Arthritis of knee    Arthritis of hand    Back pain of thoracolumbar region    GERD (gastroesophageal reflux disease)    Primary osteoarthritis of right knee    Acute rheumatoid arthritis    Primary hyperparathyroidism    Anemia    Screening for colorectal cancer    Essential hypertension    Anemia of chronic disease    Knee pain    S/P total knee arthroplasty    Decreased ROM of right knee    Weakness of right lower extremity    Difficulty walking    Decreased functional mobility    Cardiomyopathy due to hypertension, with heart failure    Cardiomyopathy    Coronary artery disease involving native coronary artery of native heart with angina pectoris    Moderate mitral insufficiency    Urolithiasis       Post-Op Diagnosis:   Same  Left ureteral stone    Procedure(s) Performed:   1.  Left ureteroscopy  2.  Cystoscopy  3.  Left retrograde pyelogram  4.  Left stent placement  5.  Stone basket extraction  6.  Laser lithotripsy  5.  Fluoro < 1 h    Specimen(s): stone for analysis    Staff Surgeon: Everette Quiroz MD    Assistant Surgeon: Jolanta Swanson MD; Chuckie Liu MD    Anesthesia: General endotracheal anesthesia    Indications: Christina Tomlinson is a 62 y.o. female with a left renal stones presenting for definitive management.       Findings:   - stone lasered within kidney and sizable fragments removed  - small ureteral stone noted on removal of access sheath, basketed    1 baskets were used throughout the case.      Estimated Blood Loss: min    Drains: 6 Fr x 24 cm JJ ureteral stent without strings    Procedure  in detail:  After informed consent was obtained, the patient was brought the the cystoscopy suite and placed in the supine position.  SCDs were applied and working.  Anesthesia was administered.  The patient was then placed in the dorsal lithotomy position and prepped and draped in the usual sterile fashion.      A rigid cystoscope in a 22 Fr sheath was introduced into the patient's urethra.  This passed easily.  The entire urethra was visualized which showed no strictures or masses.  Formal cystoscopy was performed which revealed no masses or lesions suspicious for malignancy, no bladder stones, no bladder diverticuli, no trabeculations.  The ureteral orifices were visualized in the normal anatomic position bilaterally.      A motion wire was passed up the left ureteral orifice and up into the kidney.  This passed easily and placement was confirmed using fluoro. A stiff glide wire was then passed in the same fashion. The cystoscope was removed keeping the wires in place.      An 11/13 Fr access sheath was placed over the stiff glide wire under fluoro. This passed easily. The flexible ureteroscope was inserted.     A stone was encountered in the renal pelvis.  A 200 micron Chalkable laser fiber was passed through the ureteroscope.  The stone was fragmented using the laser.  The laser fiber was removed and a Nitinol tipless basket was introduced through the ureteroscope.  Stone fragments were removed via the access sheath. A RGP was performed to ensure all calyces were visualized and outline the renal pelvis.    The sheath was then removed under direct vision. A 5 mm stone was encountered in the distal ureter, this was removed with the basket. There was minimal trauma noted to the ureter.     A 6 Fr x 24 cm JJ ureteral stent without strings was passed over the wire and up into the renal pelvis using fluoro.  When the coil appeared to be in good position in the kidney the wire was removed under continuous fluoro.  Good  coils were seen in the kidney and the bladder using fluoro. The bladder was drained.     The patient tolerated the procedure well and was transferred to the recovery room in stable condition.      Disposition:  The patient will follow up with Dr. Quiroz in 2 weeks for stent removal.      Jolanta Swanson MD

## 2018-10-08 ENCOUNTER — TELEPHONE (OUTPATIENT)
Dept: UROLOGY | Facility: CLINIC | Age: 63
End: 2018-10-08

## 2018-10-08 LAB
ANNOTATION COMMENT IMP: NORMAL
COMPN STONE: NORMAL
SPECIMEN SOURCE: NORMAL
STONE ANALYSIS IR-IMP: NORMAL

## 2018-10-08 NOTE — ANESTHESIA POSTPROCEDURE EVALUATION
"Anesthesia Post Evaluation    Patient: Christina Tolminson    Procedure(s) Performed: Procedure(s) (LRB):  URETEROSCOPY (Left)  LITHOTRIPSY, USING LASER (Left)  PLACEMENT-STENT (Left)  CYSTOSCOPY  PYELOGRAM, RETROGRADE (Left)  EXTRACTION - STONE (Left)    Final Anesthesia Type: general  Patient location during evaluation: PACU  Patient participation: Yes- Able to Participate  Level of consciousness: awake and alert  Post-procedure vital signs: reviewed and stable  Pain management: adequate  Airway patency: patent  PONV status at discharge: No PONV  Anesthetic complications: no      Cardiovascular status: blood pressure returned to baseline  Respiratory status: unassisted  Hydration status: euvolemic  Follow-up not needed.        Visit Vitals  /63   Pulse 82   Temp 36.2 °C (97.2 °F) (Temporal)   Resp 18   Ht 5' 4" (1.626 m)   Wt 93.4 kg (206 lb)   SpO2 97%   Breastfeeding? No   BMI 35.36 kg/m²       Pain/Renee Score: No Data Recorded      "

## 2018-10-08 NOTE — TELEPHONE ENCOUNTER
----- Message from Mark Mcgee sent at 10/8/2018 11:03 AM CDT -----  Contact: self:  [phone]  Patient Returning Call from [facility]    Who Left Message for Patient: pt was unsure on who called   Communication Preference:[phone]   Additional Information: pt stated called got disconnected before she can get a name

## 2018-10-15 ENCOUNTER — NURSE TRIAGE (OUTPATIENT)
Dept: ADMINISTRATIVE | Facility: CLINIC | Age: 63
End: 2018-10-15

## 2018-10-15 NOTE — TELEPHONE ENCOUNTER
Reason for Disposition   Health Information question, no triage required and triager able to answer question    Protocols used: ST INFORMATION ONLY CALL-A-    Patient called to report the following:     -tomorrow I will have scope and how do I prep   -patient advised to eat and take your usual medicines before this test per pre-op instructions   -patient verbalized understanding

## 2018-10-16 ENCOUNTER — HOSPITAL ENCOUNTER (OUTPATIENT)
Dept: UROLOGY | Facility: HOSPITAL | Age: 63
Discharge: HOME | End: 2018-10-16
Attending: UROLOGY
Payer: MEDICAID

## 2018-10-16 VITALS
WEIGHT: 207 LBS | RESPIRATION RATE: 20 BRPM | BODY MASS INDEX: 35.34 KG/M2 | SYSTOLIC BLOOD PRESSURE: 154 MMHG | HEART RATE: 106 BPM | HEIGHT: 64 IN | DIASTOLIC BLOOD PRESSURE: 91 MMHG | TEMPERATURE: 98 F

## 2018-10-16 DIAGNOSIS — N20.9 UROLITHIASIS: ICD-10-CM

## 2018-10-16 DIAGNOSIS — N20.1 CALCULUS OF URETER: ICD-10-CM

## 2018-10-16 PROCEDURE — 52310 CYSTOSCOPY AND TREATMENT: CPT

## 2018-10-16 PROCEDURE — 52310 CYSTOSCOPY AND TREATMENT: CPT | Mod: ,,, | Performed by: UROLOGY

## 2018-10-16 RX ORDER — CIPROFLOXACIN 500 MG/1
500 TABLET ORAL ONCE
Status: COMPLETED | OUTPATIENT
Start: 2018-10-16 | End: 2018-10-16

## 2018-10-16 RX ORDER — LIDOCAINE HYDROCHLORIDE 20 MG/ML
10 JELLY TOPICAL
Status: COMPLETED | OUTPATIENT
Start: 2018-10-16 | End: 2018-10-16

## 2018-10-16 RX ADMIN — LIDOCAINE HYDROCHLORIDE 10 ML: 20 JELLY TOPICAL at 11:10

## 2018-10-16 RX ADMIN — CIPROFLOXACIN 500 MG: 500 TABLET ORAL at 11:10

## 2018-10-16 NOTE — PATIENT INSTRUCTIONS

## 2018-10-16 NOTE — PROCEDURES
Procedure: Flexible cysto-uretheroscopy and stent removal   Pre Procedure Diagnosis:s/p ureteroscopy and stent placement  Post Procedure Diagnosis:same   Surgeon: Everette Quiroz MD   Anesthesia: 2% uro-jet lidocaine jelly for local analgesia   Flexible cysto-urethroscopy was performed after consent was obtained. The risks and benefits were explained.   2% lidocaine urojet was used for local analgesia.   The genitalia was prepped and draped in the sterile fashion with betadine.   The flexible scope was advanced into the urethra and into the bladder. The stent was removed without difficulty.   The patient tolerated the procedure well without complication.   They will follow up in 3 months with a renal ultrasound and KUB.

## 2019-01-08 ENCOUNTER — OFFICE VISIT (OUTPATIENT)
Dept: UROLOGY | Facility: CLINIC | Age: 64
End: 2019-01-08
Payer: MEDICAID

## 2019-01-08 ENCOUNTER — HOSPITAL ENCOUNTER (OUTPATIENT)
Dept: RADIOLOGY | Facility: HOSPITAL | Age: 64
Discharge: HOME | End: 2019-01-08
Attending: UROLOGY
Payer: MEDICAID

## 2019-01-08 VITALS
WEIGHT: 207 LBS | DIASTOLIC BLOOD PRESSURE: 69 MMHG | TEMPERATURE: 98 F | SYSTOLIC BLOOD PRESSURE: 154 MMHG | HEIGHT: 64 IN | BODY MASS INDEX: 35.34 KG/M2 | HEART RATE: 99 BPM

## 2019-01-08 DIAGNOSIS — N20.9 UROLITHIASIS: ICD-10-CM

## 2019-01-08 DIAGNOSIS — N20.0 KIDNEY STONES: Primary | ICD-10-CM

## 2019-01-08 PROCEDURE — 99213 OFFICE O/P EST LOW 20 MIN: CPT | Mod: PBBFAC,25 | Performed by: UROLOGY

## 2019-01-08 PROCEDURE — 74018 RADEX ABDOMEN 1 VIEW: CPT | Mod: TC,FY,PO,ER

## 2019-01-08 PROCEDURE — 99999 PR PBB SHADOW E&M-EST. PATIENT-LVL III: ICD-10-PCS | Mod: PBBFAC,,, | Performed by: UROLOGY

## 2019-01-08 PROCEDURE — 99213 OFFICE O/P EST LOW 20 MIN: CPT | Mod: S$PBB,,, | Performed by: UROLOGY

## 2019-01-08 PROCEDURE — 76770 US EXAM ABDO BACK WALL COMP: CPT | Mod: TC,PO,ER

## 2019-01-08 PROCEDURE — 99213 PR OFFICE/OUTPT VISIT, EST, LEVL III, 20-29 MIN: ICD-10-PCS | Mod: S$PBB,,, | Performed by: UROLOGY

## 2019-01-08 PROCEDURE — 99999 PR PBB SHADOW E&M-EST. PATIENT-LVL III: CPT | Mod: PBBFAC,,, | Performed by: UROLOGY

## 2019-01-08 NOTE — PROGRESS NOTES
Subjective:      Patient ID: Christina Tomlinson is a 63 y.o. female.    Chief Complaint: Other (u/s kub results)    Patient is a 63 y.o. female who is established to our clinic and referred by the ED, Dr. Nelson for evaluation of kidney stones.     HPI  She underwent a left ureteroscopy on 10/5/18.  Cysto/stent removal was 10/16.   She is doing well. No pain. No further stones.  Here to review imaging.  Never had stones prior to this.       Review of Systems   All other systems reviewed and negative except pertinent positives noted in HPI.    Objective:     Physical Exam   Constitutional: She is oriented to person, place, and time. She appears well-developed and well-nourished. No distress.   HENT:   Head: Normocephalic and atraumatic.   Eyes: No scleral icterus.   Neck: No tracheal deviation present.   Pulmonary/Chest: Effort normal. No respiratory distress.   Neurological: She is alert and oriented to person, place, and time.   Psychiatric: She has a normal mood and affect. Her behavior is normal. Judgment and thought content normal.     Assessment:     1. Kidney stones      Plan:     1. Kidney stones:  -General risk factors for kidney stones and the conservative measures to prevent kidney stones in the future were discussed with the patient in detail.  The patient was encouraged to drink 2-3 liters of water a day, limit iced tea and cas as well as foods high in oxalate.  They were cautioned to try to limit salt and red meat intake.  We also discussed adding citrate to the diet with the addition of jeri or lemon juice to their water or alternatively with crystal light.   -Ultrasound was independently reviewed today and reveals no stones and no hydronephrosis.   -f/u 1 year with renal US.

## 2019-01-09 ENCOUNTER — TELEPHONE (OUTPATIENT)
Dept: UROLOGY | Facility: CLINIC | Age: 64
End: 2019-01-09

## 2019-01-09 DIAGNOSIS — N20.0 KIDNEY STONES: Primary | ICD-10-CM

## 2019-01-10 ENCOUNTER — HOSPITAL ENCOUNTER (OUTPATIENT)
Dept: RADIOLOGY | Facility: HOSPITAL | Age: 64
Discharge: HOME | End: 2019-01-10
Attending: UROLOGY
Payer: MEDICAID

## 2019-01-10 DIAGNOSIS — N20.0 KIDNEY STONES: ICD-10-CM

## 2019-01-10 PROCEDURE — 74176 CT ABD & PELVIS W/O CONTRAST: CPT | Mod: TC

## 2019-01-10 PROCEDURE — 74176 CT ABD & PELVIS W/O CONTRAST: CPT | Mod: 26,,, | Performed by: RADIOLOGY

## 2019-01-10 PROCEDURE — 74176 CT RENAL STONE STUDY ABD PELVIS WO: ICD-10-PCS | Mod: 26,,, | Performed by: RADIOLOGY

## 2019-03-08 ENCOUNTER — OFFICE VISIT (OUTPATIENT)
Dept: CARDIOLOGY | Facility: CLINIC | Age: 64
End: 2019-03-08
Payer: MEDICAID

## 2019-03-08 VITALS
SYSTOLIC BLOOD PRESSURE: 120 MMHG | DIASTOLIC BLOOD PRESSURE: 70 MMHG | BODY MASS INDEX: 35.68 KG/M2 | WEIGHT: 209 LBS | HEIGHT: 64 IN | HEART RATE: 64 BPM

## 2019-03-08 DIAGNOSIS — I25.119 CORONARY ARTERY DISEASE INVOLVING NATIVE CORONARY ARTERY OF NATIVE HEART WITH ANGINA PECTORIS: ICD-10-CM

## 2019-03-08 DIAGNOSIS — I43 CARDIOMYOPATHY DUE TO HYPERTENSION, WITH HEART FAILURE: Primary | ICD-10-CM

## 2019-03-08 DIAGNOSIS — I10 ESSENTIAL HYPERTENSION: ICD-10-CM

## 2019-03-08 DIAGNOSIS — I34.0 MODERATE MITRAL INSUFFICIENCY: ICD-10-CM

## 2019-03-08 DIAGNOSIS — I11.0 CARDIOMYOPATHY DUE TO HYPERTENSION, WITH HEART FAILURE: Primary | ICD-10-CM

## 2019-03-08 PROCEDURE — 99214 OFFICE O/P EST MOD 30 MIN: CPT | Mod: S$PBB,,, | Performed by: INTERNAL MEDICINE

## 2019-03-08 PROCEDURE — 99999 PR PBB SHADOW E&M-EST. PATIENT-LVL III: ICD-10-PCS | Mod: PBBFAC,,, | Performed by: INTERNAL MEDICINE

## 2019-03-08 PROCEDURE — 99214 PR OFFICE/OUTPT VISIT, EST, LEVL IV, 30-39 MIN: ICD-10-PCS | Mod: S$PBB,,, | Performed by: INTERNAL MEDICINE

## 2019-03-08 PROCEDURE — 99213 OFFICE O/P EST LOW 20 MIN: CPT | Mod: PBBFAC,PO | Performed by: INTERNAL MEDICINE

## 2019-03-08 PROCEDURE — 99999 PR PBB SHADOW E&M-EST. PATIENT-LVL III: CPT | Mod: PBBFAC,,, | Performed by: INTERNAL MEDICINE

## 2019-03-08 NOTE — PROGRESS NOTES
Subjective:   Patient ID:  Christina Tomlinson is a 63 y.o. female who presents for follow-up of No chief complaint on file.      Problem List Items Addressed This Visit        Cardiac/Vascular    Cardiomyopathy due to hypertension, with heart failure - Primary    Essential hypertension    Coronary artery disease involving native coronary artery of native heart with angina pectoris    Moderate mitral insufficiency          HPI: Patient with the above medical problems is here for f/u. She is doing well with no acute concerns. She is here for f/u of [facility] with moderate MR.   She denies chest pain or dyspnea. She has cut back on greasy and spicy foods. She is on a salt restricted diet.   BP is controlled. She is compliant with medications.  Weight is stable. She rides stationary bike for 15 minutes 3 times weekly.   Ef improved to 45% so AICD.         Review of Systems   Constitution: Negative.   HENT: Negative.    Eyes: Negative.    Cardiovascular: Negative for chest pain, claudication, cyanosis, dyspnea on exertion, irregular heartbeat, leg swelling, near-syncope, orthopnea, palpitations, paroxysmal nocturnal dyspnea and syncope.   Respiratory: Negative.    Endocrine: Negative.    Hematologic/Lymphatic: Negative.    Skin: Negative.    Musculoskeletal: Negative.    Gastrointestinal: Negative.    Neurological: Negative.    Psychiatric/Behavioral: Negative.      Patient's Medications   New Prescriptions    No medications on file   Previous Medications    ACETAMINOPHEN (TYLENOL) 500 MG TABLET    Take 500 mg by mouth every 6 (six) hours as needed for Pain.    ASPIRIN (ECOTRIN) 81 MG EC TABLET    Take 1 tablet (81 mg total) by mouth once.    ATORVASTATIN (LIPITOR) 40 MG TABLET    TAKE ONE TABLET BY MOUTH ONCE DAILY    CHOLECALCIFEROL, VITAMIN D3, 3,000 UNIT TAB    Take by mouth.    FERROUS GLUCONATE (FERGON) 240 (27 FE) MG TABLET    Take 480 mg by mouth 3 (three) times daily.    FOLIC ACID (FOLVITE) 800 MCG TAB    Take 800  mcg by mouth once daily.    FUROSEMIDE (LASIX) 40 MG TABLET    TAKE ONE TABLET BY MOUTH ONCE DAILY    HYDROXYCHLOROQUINE (PLAQUENIL) 200 MG TABLET    Take 200 mg by mouth 2 (two) times daily.     LOSARTAN (COZAAR) 50 MG TABLET    Take 1 tablet (50 mg total) by mouth once daily.    METHOTREXATE 2.5 MG TAB    Take 15 mg by mouth. Takes 7 tablets on tuesday    METHYLPREDNISOLONE (MEDROL) 4 MG TAB    Take 4 mg by mouth once daily.    METOPROLOL SUCCINATE (TOPROL-XL) 50 MG 24 HR TABLET    TAKE ONE TABLET BY MOUTH ONCE DAILY    OMEPRAZOLE (PRILOSEC) 20 MG CAPSULE    Take 1 capsule (20 mg total) by mouth once daily.    PANTOPRAZOLE (PROTONIX) 40 MG TABLET    Take 1 tablet (40 mg total) by mouth once daily.   Modified Medications    No medications on file   Discontinued Medications    No medications on file       Objective:   Physical Exam   Constitutional: She is oriented to person, place, and time. She appears well-developed and well-nourished. No distress.   Examination of the digits showed no clubbing or cyanosis   HENT:   Head: Normocephalic and atraumatic.   Eyes: Conjunctivae are normal. Pupils are equal, round, and reactive to light. Right eye exhibits no discharge.   Neck: Normal range of motion. Neck supple. No JVD present. No thyromegaly present.   No carotid bruits   Cardiovascular: Normal rate, regular rhythm, S1 normal, S2 normal, intact distal pulses and normal pulses. PMI is not displaced. Exam reveals no gallop, no friction rub and no opening snap.   Murmur heard.  Pulmonary/Chest: Effort normal and breath sounds normal. No respiratory distress. She has no wheezes. She has no rales. She exhibits no tenderness.   Abdominal: Soft. Bowel sounds are normal. She exhibits no distension and no mass. There is no tenderness. There is no guarding.   No hepatosplenomegaly   Musculoskeletal: Normal range of motion. She exhibits no edema or tenderness.   Lymphadenopathy:     She has no cervical adenopathy.    Neurological: She is alert and oriented to person, place, and time.   Skin: Skin is warm. No rash noted. She is not diaphoretic. No erythema.   Psychiatric: She has a normal mood and affect.   Nursing note and vitals reviewed.      ECGs reviewed-NSR with LVH  LABS reviewed  Imaging including Echoes reviewed- ef 45% with moderate MR  Angiographic Results     Diagnostic:          Patient has a left dominant coronary artery.        - Left Main Coronary Artery:             The ostial LM is normal. There is ARIELA 3 flow.     - Left Anterior Descending Artery:             The LAD is normal. There is ARIELA 3 flow.     - D1:             The D1 has luminal irregularities. There is ARIELA 3 flow.     - Ramus:             The proximal ramus has a 60% stenosis. There is ARIELA 3 flow.     - Left Circumflex Artery:             The LCX is normal. There is ARIELA 3 flow.     - Right Coronary Artery:             The proximal RCA has a 75% stenosis. There is ARIELA 3 flow. The remaining portion of the vessel is of small caliber.      Assessment:     1. Cardiomyopathy due to hypertension, with heart failure    2. Essential hypertension    3. Moderate mitral insufficiency    4. Coronary artery disease involving native coronary artery of native heart with angina pectoris        Plan:     Patient doing well  Continue current medications  Weight loss  Low salt diet  Activity as tolerate  F/u in 6 months.

## 2019-03-15 ENCOUNTER — TELEPHONE (OUTPATIENT)
Dept: CARDIOLOGY | Facility: CLINIC | Age: 64
End: 2019-03-15

## 2019-03-15 DIAGNOSIS — I42.9 CARDIOMYOPATHY, UNSPECIFIED TYPE: Primary | ICD-10-CM

## 2019-03-15 RX ORDER — VALSARTAN 160 MG/1
160 TABLET ORAL DAILY
Qty: 90 TABLET | Refills: 3 | Status: SHIPPED | OUTPATIENT
Start: 2019-03-15 | End: 2019-09-18 | Stop reason: SDUPTHER

## 2019-03-15 RX ORDER — VALSARTAN 160 MG/1
160 TABLET ORAL DAILY
Qty: 90 TABLET | Refills: 3 | Status: SHIPPED | OUTPATIENT
Start: 2019-03-15 | End: 2019-03-15 | Stop reason: SDUPTHER

## 2019-03-15 NOTE — TELEPHONE ENCOUNTER
----- Message from Teresita Tamez sent at 3/15/2019  2:19 PM CDT -----  Contact: pt came in the [place] office  Pt came in the [place] office, she said that her Losartan has been recalled and she needs a replacement medication.

## 2019-03-15 NOTE — TELEPHONE ENCOUNTER
----- Message from Cheyanne Herrmann sent at 3/14/2019  4:14 PM CDT -----  Contact: self / [phone]  Patient is requesting a call back regarding the below medication. Please advise         losartan (COZAAR) 50 MG tablet

## 2019-03-19 ENCOUNTER — TELEPHONE (OUTPATIENT)
Dept: CARDIOLOGY | Facility: CLINIC | Age: 64
End: 2019-03-19

## 2019-03-19 NOTE — TELEPHONE ENCOUNTER
----- Message from Teresita Tamez sent at 3/19/2019  8:22 AM CDT -----  Contact: Teresita -   Yesterday I called pt's insurance company to get a prior authorization for Mrs. Tomlinson's  Valsartan.  I talked to the Pharmacist she informed me in order for this pt's request to be processed within 24 hours I had to write URGENT on the cover sheet and have Dr. Solorio fill out the forms, fax it back to them and it will be processed within 24 hours.  I wrote urgent on the form, please have Dr Solorio fill out the forms and I can fax them back to the insurance company.

## 2019-06-25 RX ORDER — METOPROLOL SUCCINATE 50 MG/1
TABLET, EXTENDED RELEASE ORAL
Qty: 90 TABLET | Refills: 3 | Status: SHIPPED | OUTPATIENT
Start: 2019-06-25 | End: 2019-09-18 | Stop reason: SDUPTHER

## 2019-07-22 RX ORDER — ATORVASTATIN CALCIUM 40 MG/1
TABLET, FILM COATED ORAL
Qty: 90 TABLET | Refills: 3 | Status: SHIPPED | OUTPATIENT
Start: 2019-07-22 | End: 2019-09-18 | Stop reason: SDUPTHER

## 2019-08-04 RX ORDER — FUROSEMIDE 40 MG/1
TABLET ORAL
Qty: 90 TABLET | Refills: 3 | Status: SHIPPED | OUTPATIENT
Start: 2019-08-04 | End: 2019-09-18 | Stop reason: SDUPTHER

## 2019-08-22 RX ORDER — PANTOPRAZOLE SODIUM 40 MG/1
TABLET, DELAYED RELEASE ORAL
Qty: 30 TABLET | Refills: 11 | Status: SHIPPED | OUTPATIENT
Start: 2019-08-22 | End: 2020-08-10

## 2019-08-27 ENCOUNTER — TELEPHONE (OUTPATIENT)
Dept: CARDIOLOGY | Facility: CLINIC | Age: 64
End: 2019-08-27

## 2019-08-27 NOTE — TELEPHONE ENCOUNTER
----- Message from Joaquina Narayan sent at 8/27/2019 10:53 AM CDT -----  Contact: [phone]/self  Pt of Dr. Solorio is due for her annual check up. She is requesting to be seen by Dr. Arroyo at the [place] location. Leave vm if no answer. Please advise.

## 2019-08-27 NOTE — TELEPHONE ENCOUNTER
Returned pt phone call    Pt requested to establish care with new cardiologist due to Dr. Solorio leaving     Scheduled pt to see Dr. Mckeon in [facility] due to medicaid insurance, baltazar slip mailed out

## 2019-09-18 ENCOUNTER — OFFICE VISIT (OUTPATIENT)
Dept: CARDIOLOGY | Facility: CLINIC | Age: 64
End: 2019-09-18
Payer: MEDICARE

## 2019-09-18 VITALS
OXYGEN SATURATION: 97 % | HEIGHT: 64 IN | DIASTOLIC BLOOD PRESSURE: 74 MMHG | WEIGHT: 218.88 LBS | SYSTOLIC BLOOD PRESSURE: 120 MMHG | BODY MASS INDEX: 37.37 KG/M2 | HEART RATE: 97 BPM

## 2019-09-18 DIAGNOSIS — I42.9 CARDIOMYOPATHY, UNSPECIFIED TYPE: ICD-10-CM

## 2019-09-18 DIAGNOSIS — I11.0 CARDIOMYOPATHY DUE TO HYPERTENSION, WITH HEART FAILURE: Primary | ICD-10-CM

## 2019-09-18 DIAGNOSIS — I43 CARDIOMYOPATHY DUE TO HYPERTENSION, WITH HEART FAILURE: Primary | ICD-10-CM

## 2019-09-18 DIAGNOSIS — I25.119 CORONARY ARTERY DISEASE INVOLVING NATIVE CORONARY ARTERY OF NATIVE HEART WITH ANGINA PECTORIS: ICD-10-CM

## 2019-09-18 DIAGNOSIS — I10 ESSENTIAL HYPERTENSION: ICD-10-CM

## 2019-09-18 PROCEDURE — 99214 OFFICE O/P EST MOD 30 MIN: CPT | Mod: S$GLB,,, | Performed by: STUDENT IN AN ORGANIZED HEALTH CARE EDUCATION/TRAINING PROGRAM

## 2019-09-18 PROCEDURE — 99214 PR OFFICE/OUTPT VISIT, EST, LEVL IV, 30-39 MIN: ICD-10-PCS | Mod: S$GLB,,, | Performed by: STUDENT IN AN ORGANIZED HEALTH CARE EDUCATION/TRAINING PROGRAM

## 2019-09-18 RX ORDER — FUROSEMIDE 40 MG/1
40 TABLET ORAL DAILY
Qty: 90 TABLET | Refills: 3 | Status: SHIPPED | OUTPATIENT
Start: 2019-09-18 | End: 2020-08-10 | Stop reason: SDUPTHER

## 2019-09-18 RX ORDER — METOPROLOL SUCCINATE 50 MG/1
50 TABLET, EXTENDED RELEASE ORAL DAILY
Qty: 90 TABLET | Refills: 3 | Status: SHIPPED | OUTPATIENT
Start: 2019-09-18 | End: 2020-08-10 | Stop reason: SDUPTHER

## 2019-09-18 RX ORDER — AMITRIPTYLINE HYDROCHLORIDE 25 MG/1
25 TABLET, FILM COATED ORAL NIGHTLY PRN
COMMUNITY

## 2019-09-18 RX ORDER — BRIMONIDINE TARTRATE 1.5 MG/ML
1 SOLUTION/ DROPS OPHTHALMIC 3 TIMES DAILY
COMMUNITY

## 2019-09-18 RX ORDER — ATORVASTATIN CALCIUM 40 MG/1
40 TABLET, FILM COATED ORAL DAILY
Qty: 90 TABLET | Refills: 3 | Status: SHIPPED | OUTPATIENT
Start: 2019-09-18 | End: 2020-08-10 | Stop reason: SDUPTHER

## 2019-09-18 RX ORDER — ASPIRIN 81 MG/1
81 TABLET ORAL ONCE
Qty: 90 TABLET | Refills: 3
Start: 2019-09-18 | End: 2020-08-10 | Stop reason: SDUPTHER

## 2019-09-18 RX ORDER — VALSARTAN 160 MG/1
160 TABLET ORAL DAILY
Qty: 90 TABLET | Refills: 3 | Status: SHIPPED | OUTPATIENT
Start: 2019-09-18 | End: 2020-08-10 | Stop reason: SDUPTHER

## 2019-09-18 NOTE — PROGRESS NOTES
"   Cardiology Clinic note    Subjective:   Patient ID:  Christina Tomlinson is a 63 y.o. female who presents for evaluation of cardiomyopathy    HPI:   Christina Tomlinson  has a past medical history of Arthritis, Cardiomyopathy, Coronary artery disease, GERD (gastroesophageal reflux disease), and Hypertension.   Pt was previously seen by Dr. Solorio..    Hx of cardiomyopathy with LVEF ~ 45  9/18/19  Pt is doing well. No acute complaints at this time.  Weight is stable. She rides stationary bike for 15 minutes 3 times weekly. Good functional capacity. Compliant with home medication.  Ef improved to 45% so not needing AICD.   Needing refill of home medications      Vitals  Vitals:    09/18/19 0812   BP: 120/74   Pulse: 97   SpO2: 97%   Weight: 99.3 kg (218 lb 14.4 oz)   Height: 5' 4" (1.626 m)       Patient Active Problem List    Diagnosis Date Noted    Urolithiasis 10/05/2018    Moderate mitral insufficiency 01/10/2017    Cardiomyopathy 08/09/2016    Coronary artery disease involving native coronary artery of native heart with angina pectoris 08/09/2016    Cardiomyopathy due to hypertension, with heart failure 06/22/2016     L dominant system, LAD, D1, LCx patent, Ramus 60%, small RCA with 75% stenosis, EF 40% with LVEDP 28 mmHg  MR not well visualized, at least moderate      Decreased ROM of right knee 03/15/2016    Weakness of right lower extremity 03/15/2016    Difficulty walking 03/15/2016    Decreased functional mobility 03/15/2016    S/P total knee arthroplasty 03/14/2016    Knee pain 03/09/2016    Essential hypertension 03/02/2016    Anemia of chronic disease 03/02/2016    Screening for colorectal cancer 02/01/2016    Anemia 01/13/2016    Primary hyperparathyroidism 12/02/2015    Acute rheumatoid arthritis 10/08/2015    Primary osteoarthritis of right knee 09/16/2015    Back pain of thoracolumbar region 06/29/2015    GERD (gastroesophageal reflux disease) 06/29/2015    Arthritis of knee " 04/07/2015    Arthritis of hand 04/07/2015    Medial meniscus tear 08/27/2014    Dysphagia, oropharyngeal 09/06/2013    Dysphagia, unspecified(787.20) 09/06/2013     Dx updated per 2019 IMO Load      Breast cancer screening 07/30/2013    Sore throat 01/07/2013    Torn rotator cuff 01/07/2013    Corns and callosities 11/30/2012    Dermatophytosis of nail 11/30/2012    Pain in limb 11/30/2012       Patient's Medications   New Prescriptions    No medications on file   Previous Medications    ACETAMINOPHEN (TYLENOL) 500 MG TABLET    Take 500 mg by mouth every 6 (six) hours as needed for Pain.    AMITRIPTYLINE (ELAVIL) 25 MG TABLET    Take 25 mg by mouth nightly as needed for Insomnia.    BRIMONIDINE 0.15 % OPTH DROP (ALPHAGAN) 0.15 % OPHTHALMIC SOLUTION    1 drop 3 (three) times daily.    CHOLECALCIFEROL, VITAMIN D3, 3,000 UNIT TAB    Take by mouth.    FERROUS GLUCONATE (FERGON) 240 (27 FE) MG TABLET    Take 480 mg by mouth 3 (three) times daily.    FOLIC ACID (FOLVITE) 800 MCG TAB    Take 800 mcg by mouth once daily.    HYDROXYCHLOROQUINE (PLAQUENIL) 200 MG TABLET    Take 200 mg by mouth 2 (two) times daily.     PANTOPRAZOLE (PROTONIX) 40 MG TABLET    TAKE 1 TABLET BY MOUTH ONCE DAILY   Modified Medications    Modified Medication Previous Medication    ASPIRIN (ECOTRIN) 81 MG EC TABLET aspirin (ECOTRIN) 81 MG EC tablet       Take 1 tablet (81 mg total) by mouth once. for 1 dose    Take 1 tablet (81 mg total) by mouth once.    ATORVASTATIN (LIPITOR) 40 MG TABLET atorvastatin (LIPITOR) 40 MG tablet       Take 1 tablet (40 mg total) by mouth once daily.    TAKE 1 TABLET BY MOUTH ONCE DAILY    FUROSEMIDE (LASIX) 40 MG TABLET furosemide (LASIX) 40 MG tablet       Take 1 tablet (40 mg total) by mouth once daily.    TAKE 1 TABLET BY MOUTH ONCE DAILY    METOPROLOL SUCCINATE (TOPROL-XL) 50 MG 24 HR TABLET metoprolol succinate (TOPROL-XL) 50 MG 24 hr tablet       Take 1 tablet (50 mg total) by mouth once daily.    TAKE  1 TABLET BY MOUTH ONCE DAILY    VALSARTAN (DIOVAN) 160 MG TABLET valsartan (DIOVAN) 160 MG tablet       Take 1 tablet (160 mg total) by mouth once daily.    Take 1 tablet (160 mg total) by mouth once daily.   Discontinued Medications    METHOTREXATE 2.5 MG TAB    Take 15 mg by mouth. Takes 7 tablets on tuesday    METHYLPREDNISOLONE (MEDROL) 4 MG TAB    Take 4 mg by mouth once daily.    OMEPRAZOLE (PRILOSEC) 20 MG CAPSULE    Take 1 capsule (20 mg total) by mouth once daily.         Review of Systems   Constitution: Negative for chills, decreased appetite, malaise/fatigue and weight gain.   HENT: Negative for congestion and ear discharge.    Eyes: Negative for blurred vision and double vision.   Cardiovascular: Negative for chest pain, cyanosis, dyspnea on exertion, irregular heartbeat, leg swelling, near-syncope, orthopnea, palpitations and syncope.   Respiratory: Negative for cough, shortness of breath and sleep disturbances due to breathing.    Skin: Negative for color change and dry skin.   Musculoskeletal: Negative for joint pain, joint swelling and muscle cramps.   Gastrointestinal: Negative for bloating, heartburn, hematemesis and hematochezia.   Genitourinary: Negative for bladder incontinence and dysuria.   Neurological: Negative for aphonia, excessive daytime sleepiness, dizziness, focal weakness, headaches, light-headedness, loss of balance and weakness.   Psychiatric/Behavioral: Negative for altered mental status, depression and memory loss. The patient does not have insomnia and is not nervous/anxious.    All other systems reviewed and are negative.        Objective:   Physical Exam   Constitutional: She is oriented to person, place, and time. She appears well-developed and well-nourished.   HENT:   Head: Normocephalic and atraumatic.   Eyes: Conjunctivae and EOM are normal.   Neck: Normal range of motion. Neck supple. No JVD present.   Cardiovascular: Normal rate, regular rhythm and normal heart sounds.    No murmur heard.  Pulmonary/Chest: Effort normal and breath sounds normal. No respiratory distress. She has no wheezes. She has no rales.   Abdominal: Soft. Bowel sounds are normal. She exhibits no distension.   Musculoskeletal: Normal range of motion. She exhibits no edema.   Neurological: She is alert and oriented to person, place, and time.   Skin: Skin is warm and dry. No erythema.   Psychiatric: She has a normal mood and affect. Her behavior is normal. Judgment and thought content normal.   Nursing note and vitals reviewed.      Lab Results    Lab Results   Component Value Date     10/08/2019    K 3.7 10/08/2019     10/08/2019    CO2 30 (H) 10/08/2019    BUN 15 10/08/2019    CREATININE 1.23 10/08/2019    GLU 99 10/08/2019    HGBA1C 5.2 02/15/2016    MG 2.1 04/04/2018    AST 33 09/30/2018    ALT 25 09/30/2018    ALBUMIN 4.0 09/30/2018    PROT 7.5 09/30/2018    BILITOT 0.4 09/30/2018    WBC 10.65 09/30/2018    HGB 11.1 (L) 09/30/2018    HCT 34.3 (L) 09/30/2018    MCV 93 09/30/2018     09/30/2018    INR 1.0 08/17/2016    TSH 0.669 04/07/2015    CHOL 157 06/22/2016    HDL 39 (L) 06/22/2016    LDLCALC 97.2 06/22/2016    TRIG 104 06/22/2016    CRP 27.6 (H) 09/14/2016       Lipid panel  Lab Results   Component Value Date    CHOL 157 06/22/2016     Lab Results   Component Value Date    HDL 39 (L) 06/22/2016     Lab Results   Component Value Date    LDLCALC 97.2 06/22/2016     Lab Results   Component Value Date    TRIG 104 06/22/2016       Cardiac Studies  Significant Imaging: Echocardiogram:   2D echo with color flow doppler:   Results for orders placed or performed during the hospital encounter of 01/16/17   2D Echo w/ Color Flow Doppler   Result Value Ref Range    QEF 45 55 - 65    Mitral Valve Regurgitation MODERATE (A)     Diastolic Dysfunction No     Est. PA Systolic Pressure 18.68     Mitral Valve Mobility NORMAL     Tricuspid Valve Regurgitation TRIVIAL TO MILD     Narrative    Date of  Procedure: 01/16/2017        TEST DESCRIPTION   Technical Quality: This is a technically adequate study.     Aorta: The aortic root is normal in size, measuring 2.1 cm at sinotubular junction.     Left Atrium: The left atrial volume index is normal, measuring 24.06 cc/m2.     Left Ventricle: The left ventricle is normal in size, with an end-diastolic diameter of 5.2 cm, and an end-systolic diameter of 4.1 cm. LV wall thickness is normal, with the septum measuring 0.6 cm and the posterior wall measuring 0.5 cm across. Relative   wall thickness was normal at 0.19, and the LV mass index was 53.6 g/m2 consistent with normal left ventricular mass. Global left ventricular systolic function appears mildly depressed. Visually estimated ejection fraction is 45-50%. The LV Doppler   derived stroke volume equals 33.0 ccs.   The E/e'(lat) is 10, consistent with normal diastolic function.     Right Atrium: The right atrium is normal in size, measuring 3.3 cm in length in the apical view.     Right Ventricle: The right ventricle is normal in size measuring 2.3 cm at the base in the apical right ventricle-focused view. Global right ventricular systolic function appears normal. Tricuspid annular plane systolic excursion (TAPSE) is 2.0 cm. The   estimated PA systolic pressure is 19 mmHg.     Aortic Valve:  The aortic valve is normal in structure with normal leaflet mobility.     Mitral Valve:  The mitral valve is normal in structure with normal leaflet mobility. There is moderate mitral regurgitation.     Tricuspid Valve:  The tricuspid valve is normal in structure with normal leaflet mobility. There is trivial to mild tricuspid regurgitation.     Pulmonary Valve:  The pulmonic valve is not well seen.     IVC: IVC is normal in size and collapses > 50% with a sniff, suggesting normal right atrial pressure of 3 mmHg.     Atrial Septum: The atrial septum is intact.     Intracavitary: There is no evidence of pericardial effusion,  intracavity mass, thrombi, or vegetation.         CONCLUSIONS     1 - Mildly depressed left ventricular systolic function (EF 45-50%).     2 - Normal left ventricular diastolic function.     3 - Normal right ventricular systolic function .     4 - Moderate mitral regurgitation.             This document has been electronically    SIGNED BY: Chan Solorio MD On: 01/16/2017 11:55    and Transthoracic echo (TTE) complete (Cupid Only): No results found for this or any previous visit.  ECG:  normal EKG, normal sinus rhythm, unchanged from previous tracings.    Cath study :  2016    Angiographic Results     Diagnostic:          Patient has a left dominant coronary artery.        - Left Main Coronary Artery:             The ostial LM is normal. There is ARIELA 3 flow.     - Left Anterior Descending Artery:             The LAD is normal. There is ARIELA 3 flow.     - D1:             The D1 has luminal irregularities. There is ARIELA 3 flow.     - Ramus:             The proximal ramus has a 60% stenosis. There is ARIELA 3 flow.     - Left Circumflex Artery:             The LCX is normal. There is ARIELA 3 flow.     - Right Coronary Artery:             The proximal RCA has a 75% stenosis. There is ARIELA 3 flow. The remaining portion of the vessel is of small caliber.       Assessment:     1. Cardiomyopathy due to hypertension, with heart failure    2. Coronary artery disease involving native coronary artery of native heart with angina pectoris    3. Essential hypertension    4. Cardiomyopathy, unspecified type        Plan:     1. Milldy depressed LVEF. preserved LVEF at 45-50%  - NICM  - doing well.  - NYHA I, asymptomatic from HF perspective  - c/w toprol, diovan, lasix  - weight is up 8-9 lb from last visit  - Pt states she is compliant w/ her home medications  - no clinical s/s of heart failure. No s/s of volume overload  I spent 5-10 minutes asking, assessing, assisting, arranging and advising heart healthy diet improvements. This  included low-salt meals, portion control and health food alternatives. I also encourage 30 minutes of moderate exercise 3-4x a week.     2. Stable CAD  - stable CAD in the RAMUS and RCA from 2016  - c/w asa/statin/BB/ARB    3. HTN  - controlled, c/w home medications  - low salt diet    Doing well mostly, unsure why weight increased.  Will get bmp to follow up renal function    Continue with current medical plan and lifestyle changes.  Return sooner for concerns or questions. If symptoms persist go to the ED  Total duration of face to face visit time 30 minutes.  Total time spent counseling greater than fifty percent of total visit time.  Counseling included discussion regarding imaging findings, diagnosis, possibilities, treatment options, risks and benefits.      Orders Placed This Encounter   Procedures    Basic metabolic panel     Standing Status:   Future     Number of Occurrences:   1     Standing Expiration Date:   11/16/2020       Follow up as scheduled. Return to clinic in 6 months  She expressed verbal understanding and agreed with the plan    Thank you for the opportunity to care for this patient. Will be available for questions if needed.     Jasper Mckeon MD [facility]  Interventional Cardiology  [facility]  Pager: [phone]

## 2019-10-06 ENCOUNTER — PATIENT MESSAGE (OUTPATIENT)
Dept: CARDIOLOGY | Facility: CLINIC | Age: 64
End: 2019-10-06

## 2019-10-07 NOTE — TELEPHONE ENCOUNTER
I left patient a v/m letting her know her order for blood work is in her chart and she can walk into any [facility] lab to have her blood drawn.

## 2019-10-08 ENCOUNTER — LAB VISIT (OUTPATIENT)
Dept: LAB | Facility: HOSPITAL | Age: 64
End: 2019-10-08
Attending: STUDENT IN AN ORGANIZED HEALTH CARE EDUCATION/TRAINING PROGRAM
Payer: MEDICARE

## 2019-10-08 ENCOUNTER — TELEPHONE (OUTPATIENT)
Dept: CARDIOLOGY | Facility: CLINIC | Age: 64
End: 2019-10-08

## 2019-10-08 DIAGNOSIS — I10 ESSENTIAL HYPERTENSION: ICD-10-CM

## 2019-10-08 DIAGNOSIS — I43 CARDIOMYOPATHY DUE TO HYPERTENSION, WITH HEART FAILURE: ICD-10-CM

## 2019-10-08 DIAGNOSIS — I11.0 CARDIOMYOPATHY DUE TO HYPERTENSION, WITH HEART FAILURE: ICD-10-CM

## 2019-10-08 LAB
ANION GAP SERPL CALC-SCNC: 6 MMOL/L (ref 8–16)
BUN SERPL-MCNC: 15 MG/DL (ref 7–17)
CALCIUM SERPL-MCNC: 9.3 MG/DL (ref 8.7–10.5)
CHLORIDE SERPL-SCNC: 104 MMOL/L (ref 95–110)
CO2 SERPL-SCNC: 30 MMOL/L (ref 23–29)
CREAT SERPL-MCNC: 1.23 MG/DL (ref 0.5–1.4)
EST. GFR  (AFRICAN AMERICAN): 53.6 ML/MIN/1.73 M^2
EST. GFR  (NON AFRICAN AMERICAN): 46.5 ML/MIN/1.73 M^2
GLUCOSE SERPL-MCNC: 99 MG/DL (ref 70–110)
POTASSIUM SERPL-SCNC: 3.7 MMOL/L (ref 3.5–5.1)
SODIUM SERPL-SCNC: 140 MMOL/L (ref 136–145)

## 2019-10-08 PROCEDURE — 80048 BASIC METABOLIC PNL TOTAL CA: CPT | Mod: PO

## 2019-10-08 PROCEDURE — 36415 COLL VENOUS BLD VENIPUNCTURE: CPT | Mod: PO

## 2019-10-08 NOTE — TELEPHONE ENCOUNTER
----- Message from Jasper Mckeon MD sent at 10/8/2019  1:05 PM CDT -----  Can you let pt known her bmp is within normal limits.

## 2019-11-18 ENCOUNTER — TELEPHONE (OUTPATIENT)
Dept: INTERNAL MEDICINE | Facility: CLINIC | Age: 64
End: 2019-11-18

## 2020-01-08 ENCOUNTER — HOSPITAL ENCOUNTER (OUTPATIENT)
Dept: RADIOLOGY | Facility: HOSPITAL | Age: 65
Discharge: HOME | End: 2020-01-08
Attending: UROLOGY
Payer: MEDICARE

## 2020-01-08 ENCOUNTER — OFFICE VISIT (OUTPATIENT)
Dept: UROLOGY | Facility: CLINIC | Age: 65
End: 2020-01-08
Payer: MEDICARE

## 2020-01-08 VITALS
WEIGHT: 216 LBS | HEIGHT: 64 IN | BODY MASS INDEX: 36.88 KG/M2 | SYSTOLIC BLOOD PRESSURE: 135 MMHG | RESPIRATION RATE: 16 BRPM | HEART RATE: 83 BPM | DIASTOLIC BLOOD PRESSURE: 68 MMHG

## 2020-01-08 DIAGNOSIS — N20.0 KIDNEY STONES: ICD-10-CM

## 2020-01-08 DIAGNOSIS — N20.1 CALCULUS OF URETER: Primary | ICD-10-CM

## 2020-01-08 PROCEDURE — 99999 PR PBB SHADOW E&M-EST. PATIENT-LVL III: CPT | Mod: PBBFAC,,, | Performed by: UROLOGY

## 2020-01-08 PROCEDURE — 3078F PR MOST RECENT DIASTOLIC BLOOD PRESSURE < 80 MM HG: ICD-10-PCS | Mod: CPTII,S$GLB,, | Performed by: UROLOGY

## 2020-01-08 PROCEDURE — 99213 PR OFFICE/OUTPT VISIT, EST, LEVL III, 20-29 MIN: ICD-10-PCS | Mod: S$GLB,,, | Performed by: UROLOGY

## 2020-01-08 PROCEDURE — 76770 US EXAM ABDO BACK WALL COMP: CPT | Mod: 26,,, | Performed by: RADIOLOGY

## 2020-01-08 PROCEDURE — 3075F SYST BP GE 130 - 139MM HG: CPT | Mod: CPTII,S$GLB,, | Performed by: UROLOGY

## 2020-01-08 PROCEDURE — 3075F PR MOST RECENT SYSTOLIC BLOOD PRESS GE 130-139MM HG: ICD-10-PCS | Mod: CPTII,S$GLB,, | Performed by: UROLOGY

## 2020-01-08 PROCEDURE — 3008F PR BODY MASS INDEX (BMI) DOCUMENTED: ICD-10-PCS | Mod: CPTII,S$GLB,, | Performed by: UROLOGY

## 2020-01-08 PROCEDURE — 76770 US EXAM ABDO BACK WALL COMP: CPT | Mod: TC

## 2020-01-08 PROCEDURE — 1101F PT FALLS ASSESS-DOCD LE1/YR: CPT | Mod: CPTII,S$GLB,, | Performed by: UROLOGY

## 2020-01-08 PROCEDURE — 76770 US RETROPERITONEAL COMPLETE: ICD-10-PCS | Mod: 26,,, | Performed by: RADIOLOGY

## 2020-01-08 PROCEDURE — 3078F DIAST BP <80 MM HG: CPT | Mod: CPTII,S$GLB,, | Performed by: UROLOGY

## 2020-01-08 PROCEDURE — 3008F BODY MASS INDEX DOCD: CPT | Mod: CPTII,S$GLB,, | Performed by: UROLOGY

## 2020-01-08 PROCEDURE — 99213 OFFICE O/P EST LOW 20 MIN: CPT | Mod: S$GLB,,, | Performed by: UROLOGY

## 2020-01-08 PROCEDURE — 99999 PR PBB SHADOW E&M-EST. PATIENT-LVL III: ICD-10-PCS | Mod: PBBFAC,,, | Performed by: UROLOGY

## 2020-01-08 PROCEDURE — 1101F PR PT FALLS ASSESS DOC 0-1 FALLS W/OUT INJ PAST YR: ICD-10-PCS | Mod: CPTII,S$GLB,, | Performed by: UROLOGY

## 2020-01-08 NOTE — PROGRESS NOTES
Subjective:      Patient ID: Christina Tomlinson is a 64 y.o. female.    Chief Complaint: Nephrolithiasis (no complaints)    Patient is a 64 y.o. female who is established to our clinic and referred by the ED, Dr. Nelson for evaluation of kidney stones.     HPI  She underwent a left ureteroscopy on 10/5/18.  Cysto/stent removal was 10/16.   She is doing well. No pain. No further stones.  Here to review imaging.  Never had stones prior to this.       Review of Systems   All other systems reviewed and negative except pertinent positives noted in HPI.    Objective:     Physical Exam   Constitutional: She is oriented to person, place, and time. She appears well-developed and well-nourished. No distress.   HENT:   Head: Normocephalic and atraumatic.   Eyes: No scleral icterus.   Neck: No tracheal deviation present.   Pulmonary/Chest: Effort normal. No respiratory distress.   Neurological: She is alert and oriented to person, place, and time.   Psychiatric: She has a normal mood and affect. Her behavior is normal. Judgment and thought content normal.     Assessment:     No diagnosis found.  Plan:     1. Kidney stones:  -General risk factors for kidney stones and the conservative measures to prevent kidney stones in the future were discussed with the patient in detail.  The patient was encouraged to drink 2-3 liters of water a day, limit iced tea and cas as well as foods high in oxalate.  They were cautioned to try to limit salt and red meat intake.  We also discussed adding citrate to the diet with the addition of jeri or lemon juice to their water or alternatively with crystal light.   -Ultrasound was independently reviewed today and reveals no stones and no hydronephrosis.   -f/u prn    I spent 15 minutes with the patient of which more than half was spent in direct consultation with the patient in regards to our treatment and plan.

## 2020-06-24 ENCOUNTER — CLINICAL SUPPORT (OUTPATIENT)
Dept: REHABILITATION | Facility: HOSPITAL | Age: 65
End: 2020-06-24
Payer: MEDICARE

## 2020-06-24 DIAGNOSIS — M25.611 DECREASED ROM OF RIGHT SHOULDER: ICD-10-CM

## 2020-06-24 DIAGNOSIS — G89.29 CHRONIC PAIN OF BOTH SHOULDERS: ICD-10-CM

## 2020-06-24 DIAGNOSIS — M25.512 CHRONIC PAIN OF BOTH SHOULDERS: ICD-10-CM

## 2020-06-24 DIAGNOSIS — M62.81 MUSCLE WEAKNESS OF LEFT ARM: ICD-10-CM

## 2020-06-24 DIAGNOSIS — M25.511 CHRONIC PAIN OF BOTH SHOULDERS: ICD-10-CM

## 2020-06-24 DIAGNOSIS — M25.612 DECREASED ROM OF LEFT SHOULDER: ICD-10-CM

## 2020-06-24 DIAGNOSIS — M62.81 MUSCLE WEAKNESS OF RIGHT ARM: ICD-10-CM

## 2020-06-24 PROCEDURE — 97110 THERAPEUTIC EXERCISES: CPT | Mod: PO

## 2020-06-24 PROCEDURE — 97162 PT EVAL MOD COMPLEX 30 MIN: CPT | Mod: PO

## 2020-06-24 NOTE — PLAN OF CARE
[facility]  Physical Therapy Initial Evaluation    Date: 6/24/2020   Name: Christina Tomlinson  Clinic Number: [id number]    Therapy Diagnosis:   Encounter Diagnoses   Name Primary?    Chronic pain of both shoulders     Decreased ROM of right shoulder     Decreased ROM of left shoulder     Muscle weakness of left arm     Muscle weakness of right arm      Physician: Everette Aguirre MD    Physician Orders: PT Eval and Treat   Medical Diagnosis from Referral:   M75.101 (ICD-10-CM) - Unspecified rotator cuff tear or rupture of right shoulder, not specified as traumatic   M12.811 (ICD-10-CM) - Other specific arthropathies, not elsewhere classified, right shoulder   M12.812 (ICD-10-CM) - Other specific arthropathies, not elsewhere classified, left shoulder   M75.102 (ICD-10-CM) - Unspecified rotator cuff tear or rupture of left shoulder, not specified as traumatic   Evaluation Date: 6/24/2020  Authorization Period Expiration: 08/19/2020  Plan of Care Expiration: 8/24/2020  Visit # / Visits authorized: 1/ 12    Time In: 12:30 pm  Time Out: 1:15 pm  Total Appointment Time (timed & untimed codes): 45 minutes    Precautions: Standard    Subjective   Date of onset: chronic  History of current condition - Christina reports: history of bilateral shoulder pain that has recently gotten worse.  She is having increased pain that limits ability to reaching overhead, behind her back, lifting, pushing, and pulling.  Pt has limited tolerance to cooking, cleaning, sleeping, dressing, and grooming.       Medical History:   Past Medical History:   Diagnosis Date    Arthritis     Cardiomyopathy     Coronary artery disease     GERD (gastroesophageal reflux disease)     Hypertension        Surgical History:   Christina Tomlinson  has a past surgical history that includes Hysterectomy; Tonsillectomy; Other surgical history; Cholecystectomy; Knee arthroscopy; Colonoscopy (N/A, 2/1/2016); Parathyroidectomy;  Joint replacement (Right); Ureteroscopy (Left, 10/5/2018); Laser lithotripsy (Left, 10/5/2018); Cystoscopy (10/5/2018); and Retrograde pyelography (Left, 10/5/2018).    Medications:   Christina has a current medication list which includes the following prescription(s): acetaminophen, amitriptyline, aspirin, atorvastatin, brimonidine 0.15 % opth drop, cholecalciferol (vitamin d3), ferrous gluconate, folic acid, furosemide, hydroxychloroquine, metoprolol succinate, pantoprazole, and valsartan.    Allergies:   Review of patient's allergies indicates:   Allergen Reactions    Lisinopril Edema        Imaging, no recent    Prior Therapy: yes  Social History:  lives with their daughter  Occupation: retired  Prior Level of Function: independent  Current Level of Function: limited tolerance to cleaning, cooking, and ADL's    Pain:  Current 8/10, worst 10/10, best 5/10   Location: bilateral shoulder   Description: Sharp  Aggravating Factors: reaching, lifting, pushing, pulling  Easing Factors: nothing    Pts goals: decrease pain and improve functional mobility    Objective     Observation: pt is a 64 year old female that presents to therapy in no apparent distress.  She has limited AROM in bilateral shoulders    Posture: rounded shoulder      Passive Range of Motion:  Measured in supine  Shoulder Left Right   Flexion 95 110   Abduction 80 90   ER at 45 5 40   IR 65 55      Active Range of Motion: measured in standing  Shoulder Left Right   Flexion 95 100   Abduction 65 85   ER  Reach ear Reach C3   IR Reach glute Reach sacrum     Upper Extremity Strength   (L) UE (R) UE   Shoulder flexion: 3-/5 3/5   Shoulder Abduction: 3-/5 3/5   Shoulder ER 3+/5 3+/5   Shoulder IR 3+/5 4-/5   Middle Trap 3/5 3/5   Rhomboids 3/5 3/5         Special Tests: limited tolerance to special testing with pain and limited AROM in all planes    Joint Mobility: hypomobile GH joint mobility in bilateral UE's    Palpation: tenderness to palpation noted in  "deltoids, pec major, pec minor, upper trapezius, levator scapulae, rhomboids, posterior rotator cuff, and lat dorsi    Sensation: intact to light touch          Limitation/Restriction for FOTO Shoulder Survey    Therapist reviewed FOTO scores for Christina Tomlinson on 6/24/2020.   FOTO documents entered into [facility] - see Media section.    Limitation Score: 47%         TREATMENT   Treatment Time In: 1:05 pm  Treatment Time Out: 1:15 pm  Total Treatment time (time-based codes) separate from Evaluation: 10 minutes    Christina received therapeutic exercises to develop strength, ROM and flexibility for 10 minutes including:    Date  6/24/2020   VISIT 1/12       POC EXP. DATE 8/24/2020   VISIT AMOUNT  MEDICARE TOTAL 113.20   FACE-TO-FACE 7/24/2020   FOTO 1/5       Pulley for flexion next   TABLE:    Scapular retraction 10 x 3"   Wand flexion 1 x 10   Wand ER next   pec stretch next   Snow michael next                       STANDING:    pec stretch next   row next   Corazon's next   Codman's next               Initials MA         Home Exercises and Patient Education Provided    Education provided:   - HEP    Written Home Exercises Provided: yes.  Exercises were reviewed and Christina was able to demonstrate them prior to the end of the session.  Christina demonstrated good  understanding of the education provided.     See EMR under Patient Instructions for exercises provided 6/24/2020.    Assessment   Christina is a 64 y.o. female referred to outpatient Physical Therapy with a medical diagnosis of bilateral rotator cuff tear. Pt presents with signs and symptoms consistent with the diagnosis.  She is noted to have rounded shoulder posture, decreased ROM, decreased strength, and bilateral shoulder pain limiting tolerance to ADL's.  She would benefit from manual therapy, AAROM, AROM, rotator cuff strengthening, scapular stabilization, and posture exercises to improve functional mobility, and return to prior level of function.    Pt " prognosis is Good.   Pt will benefit from skilled outpatient Physical Therapy to address the deficits stated above and in the chart below, provide pt/family education, and to maximize pt's level of independence.     Plan of care discussed with patient: Yes  Pt's spiritual, cultural and educational needs considered and patient is agreeable to the plan of care and goals as stated below:     Anticipated Barriers for therapy: none    Medical Necessity is demonstrated by the following  History  Co-morbidities and personal factors that may impact the plan of care Co-morbidities:   CAD, high BMI and HTN    Personal Factors:   no deficits     low   Examination  Body Structures and Functions, activity limitations and participation restrictions that may impact the plan of care Body Regions:   upper extremities    Body Systems:    ROM  strength    Participation Restrictions:   none    Activity limitations:   Learning and applying knowledge  No deficits    General Tasks and Commands  no deficits    Communication  no deficits    Mobility  lifting and carrying objects  fine hand use (grasping/picking up)    Self care  no deficits    Domestic Life  shopping  cooking  doing house work (cleaning house, washing dishes, laundry)    Interactions/Relationships  no deficits    Life Areas  no deficits    Community and Social Life  no deficits         moderate   Clinical Presentation evolving clinical presentation with changing clinical characteristics moderate   Decision Making/ Complexity Score: moderate     Goals:  Short Term Goals:  4 weeks  1. Patient will be compliant with HEP to promote the independent management of current diagnosis.  2. Patient will increase bilateral shoulder flexion to 110 degrees for function of grooming.  3. Patient will increase bilateral shoulder functiona ER to reach C7.  4. Patient will report a decrease in complaints of bilateral shoulder pain to 5/10 during performance of ADL's for independence of self  care activities.       Long Term Goals:  8 weeks  1. Patient will increase bilateral shoulder strength to 4/5 to improve tolerance to normal house work..  2. Patient will increase bilateral shoulder flexion to 125 degrees in order to place dishes in overhead cabinets independently for self care independence.   3. Patient will increase bilateral shoulder functional IR to reach L3 for function of dressing.  4. Patient will increase scapular stabilization strength to 4/5 to improve tolerance to normal lifting.  5. Patient will improve FOTO limitation status from 47% to 36%  indicating increased functional mobility.      Plan   Plan of care Certification: 6/24/2020 to 8/24/2020.    Outpatient Physical Therapy 2 times weekly for 8 weeks to include the following interventions: Manual Therapy, Moist Heat/ Ice, Neuromuscular Re-ed, Patient Education, Therapeutic Activites, Therapeutic Exercise and IASTM, vacuum cupping, dry needling, and kinesiotaping.     Everette Redd, PT     I certify the need for these services furnished under this plan of treatment and while under my care.  Everette Aguirre MD

## 2020-06-26 ENCOUNTER — CLINICAL SUPPORT (OUTPATIENT)
Dept: REHABILITATION | Facility: HOSPITAL | Age: 65
End: 2020-06-26
Payer: MEDICARE

## 2020-06-26 DIAGNOSIS — M25.611 DECREASED ROM OF RIGHT SHOULDER: ICD-10-CM

## 2020-06-26 DIAGNOSIS — M25.612 DECREASED ROM OF LEFT SHOULDER: ICD-10-CM

## 2020-06-26 DIAGNOSIS — M62.81 MUSCLE WEAKNESS OF LEFT ARM: ICD-10-CM

## 2020-06-26 DIAGNOSIS — M62.81 MUSCLE WEAKNESS OF RIGHT ARM: ICD-10-CM

## 2020-06-26 DIAGNOSIS — M25.512 CHRONIC PAIN OF BOTH SHOULDERS: ICD-10-CM

## 2020-06-26 DIAGNOSIS — M25.511 CHRONIC PAIN OF BOTH SHOULDERS: ICD-10-CM

## 2020-06-26 DIAGNOSIS — G89.29 CHRONIC PAIN OF BOTH SHOULDERS: ICD-10-CM

## 2020-06-26 PROCEDURE — 97110 THERAPEUTIC EXERCISES: CPT | Mod: PO

## 2020-06-26 NOTE — PROGRESS NOTES
"  Physical Therapy Treatment Note     Name: Christina Tomlinson  Clinic Number: [id number]    Therapy Diagnosis:   Encounter Diagnoses   Name Primary?    Chronic pain of both shoulders     Decreased ROM of right shoulder     Decreased ROM of left shoulder     Muscle weakness of left arm     Muscle weakness of right arm      Physician: Everette Aguirre MD    Visit Date: 6/26/2020    Physician Orders: PT Eval and Treat   Medical Diagnosis from Referral:   M75.101 (ICD-10-CM) - Unspecified rotator cuff tear or rupture of right shoulder, not specified as traumatic   M12.811 (ICD-10-CM) - Other specific arthropathies, not elsewhere classified, right shoulder   M12.812 (ICD-10-CM) - Other specific arthropathies, not elsewhere classified, left shoulder   M75.102 (ICD-10-CM) - Unspecified rotator cuff tear or rupture of left shoulder, not specified as traumatic   Evaluation Date: 6/24/2020  Authorization Period Expiration: 08/19/2020  Plan of Care Expiration: 8/24/2020  Visit # / Visits authorized: 1/ 12    Time In: 11:05 am  Time Out: 11:43   Total Billable Time: 36 minutes    Precautions: Standard    Subjective     Pt reports: shoulders are hurting today  She was compliant with home exercise program.  Response to previous treatment: no increase in soreness  Functional change: none at this time    Pain: 7/10  Location: bilateral shoulder  L>R    Objective     Christina received therapeutic exercises to develop strength, ROM and flexibility for 36 minutes including:    Date  6/26/2020 6/24/2020   VISIT 2/12 1/12          POC EXP. DATE 8/24/2020 8/24/2020   VISIT AMOUNT  MEDICARE TOTAL 60.64  173.84 113.20   FACE-TO-FACE 7/24/2020 7/24/2020   FOTO 2/5 1/5          Pulley for flexion 2' next   TABLE:      Scapular retraction 15 x 3" 10 x 3"   Wand flexion 1 x 10 1 x 10   Wand ER -- next   pec stretch -- next   Snow michael -- next   Post shoulder roll 1 x 10     Table slides       flex 1 x 10      abd 1 x 10      ER 1 x 10   "   STANDING:      pec stretch next next   row next next   Corazon's next next   Codman's next next                        Initials DG MA       Home Exercises Provided and Patient Education Provided     Education provided:   - continued compliance with HEP    Written Home Exercises Provided: yes.  Exercises were reviewed and Christina was able to demonstrate them prior to the end of the session.  Christina demonstrated good  understanding of the education provided.     See EMR under Patient Instructions for exercises provided 6/26/2020.    Assessment     Christina was able to begin making progress towards her goals as she was able to perform all of today's exercises with no increase in symptoms prior to leaving the clinic. She required frequent verbal and tactile cues to avoid substitutions with all exercises. Wand abduction and ER were not attempted today due to patient's limited ROM and complaints of severe pulling in axillary region with flexion.   Christina is progressing well towards her goals.   Pt prognosis is Good.     Pt will continue to benefit from skilled outpatient physical therapy to address the deficits listed in the problem list box on initial evaluation, provide pt/family education and to maximize pt's level of independence in the home and community environment.     Pt's spiritual, cultural and educational needs considered and pt agreeable to plan of care and goals.     Anticipated barriers to physical therapy: none    Goals:   Short Term Goals:  4 weeks  1. Patient will be compliant with HEP to promote the independent management of current diagnosis. In progress, not met  2. Patient will increase bilateral shoulder flexion to 110 degrees for function of grooming. In progress, not met  3. Patient will increase bilateral shoulder functiona ER to reach C7. In progress, not met  4. Patient will report a decrease in complaints of bilateral shoulder pain to 5/10 during performance of ADL's for independence of self  care activities.  In progress, not met        Long Term Goals:  8 weeks  1. Patient will increase bilateral shoulder strength to 4/5 to improve tolerance to normal house work.. In progress, not met  2. Patient will increase bilateral shoulder flexion to 125 degrees in order to place dishes in overhead cabinets independently for self care independence.  In progress, not met  3. Patient will increase bilateral shoulder functional IR to reach L3 for function of dressing. In progress, not met  4. Patient will increase scapular stabilization strength to 4/5 to improve tolerance to normal lifting. In progress, not met  5. Patient will improve FOTO limitation status from 47% to 36%  indicating increased functional mobility. In progress, not met  Plan     Continue with the plan of care and increase reps with table slides next visit.     Cora Degroot, PT

## 2020-06-26 NOTE — PATIENT INSTRUCTIONS
Flexion (Passive)        Sitting upright, slide forearm forward along table, bending from the waist until a stretch is felt.   Repeat 10 times. Do 2 sessions per day.    Copyright © I. All rights reserved.   Abduction (Passive)        With arm out to side, resting on table, slide arm across table.   Repeat 10 times. Do 2 sessions per day.    Copyright © [employer]. All rights reserved.   External Rotation (Assistive)        Seated with elbow bent at right angle and held against side, slide arm on table surface in an outward arc.  Repeat 10 times. Do 2 sessions per day.      Copyright © [employer]. All rights reserved.

## 2020-06-29 ENCOUNTER — CLINICAL SUPPORT (OUTPATIENT)
Dept: REHABILITATION | Facility: HOSPITAL | Age: 65
End: 2020-06-29
Payer: MEDICARE

## 2020-06-29 DIAGNOSIS — M62.81 MUSCLE WEAKNESS OF LEFT ARM: ICD-10-CM

## 2020-06-29 DIAGNOSIS — M62.81 MUSCLE WEAKNESS OF RIGHT ARM: ICD-10-CM

## 2020-06-29 DIAGNOSIS — M25.511 CHRONIC PAIN OF BOTH SHOULDERS: ICD-10-CM

## 2020-06-29 DIAGNOSIS — G89.29 CHRONIC PAIN OF BOTH SHOULDERS: ICD-10-CM

## 2020-06-29 DIAGNOSIS — M25.611 DECREASED ROM OF RIGHT SHOULDER: ICD-10-CM

## 2020-06-29 DIAGNOSIS — M25.612 DECREASED ROM OF LEFT SHOULDER: ICD-10-CM

## 2020-06-29 DIAGNOSIS — M25.512 CHRONIC PAIN OF BOTH SHOULDERS: ICD-10-CM

## 2020-06-29 PROCEDURE — 97110 THERAPEUTIC EXERCISES: CPT | Mod: PO

## 2020-07-06 ENCOUNTER — CLINICAL SUPPORT (OUTPATIENT)
Dept: REHABILITATION | Facility: HOSPITAL | Age: 65
End: 2020-07-06
Payer: MEDICARE

## 2020-07-06 DIAGNOSIS — M25.611 DECREASED ROM OF RIGHT SHOULDER: ICD-10-CM

## 2020-07-06 DIAGNOSIS — M25.511 CHRONIC PAIN OF BOTH SHOULDERS: Primary | ICD-10-CM

## 2020-07-06 DIAGNOSIS — M25.612 DECREASED ROM OF LEFT SHOULDER: ICD-10-CM

## 2020-07-06 DIAGNOSIS — M25.512 CHRONIC PAIN OF BOTH SHOULDERS: Primary | ICD-10-CM

## 2020-07-06 DIAGNOSIS — M62.81 MUSCLE WEAKNESS OF RIGHT ARM: ICD-10-CM

## 2020-07-06 DIAGNOSIS — G89.29 CHRONIC PAIN OF BOTH SHOULDERS: Primary | ICD-10-CM

## 2020-07-06 DIAGNOSIS — M62.81 MUSCLE WEAKNESS OF LEFT ARM: ICD-10-CM

## 2020-07-06 PROCEDURE — 97110 THERAPEUTIC EXERCISES: CPT | Mod: PO,CQ

## 2020-07-06 NOTE — PROGRESS NOTES
Physical Therapy Treatment Note     Name: Christina Tomlinson  Clinic Number: [id number]    Therapy Diagnosis:   Encounter Diagnoses   Name Primary?    Chronic pain of both shoulders Yes    Decreased ROM of right shoulder     Decreased ROM of left shoulder     Muscle weakness of left arm     Muscle weakness of right arm      Physician: Everette Aguirre MD    Visit Date: 7/6/2020    Physician Orders: PT Eval and Treat   Medical Diagnosis from Referral:   M75.101 (ICD-10-CM) - Unspecified rotator cuff tear or rupture of right shoulder, not specified as traumatic   M12.811 (ICD-10-CM) - Other specific arthropathies, not elsewhere classified, right shoulder   M12.812 (ICD-10-CM) - Other specific arthropathies, not elsewhere classified, left shoulder   M75.102 (ICD-10-CM) - Unspecified rotator cuff tear or rupture of left shoulder, not specified as traumatic   Evaluation Date: 6/24/2020  Authorization Period Expiration: 08/19/2020  Plan of Care Expiration: 8/24/2020  Visit # / Visits authorized: 4 / 12    Time In: 10:30 am  Time Out: 11:15 am  Total Billable Time: 45 minutes    Precautions: Standard    Subjective     Pt reports: Pt performed exercises over the weekend and reports they are getting a little easier.   She was compliant with home exercise program.  Response to previous treatment: slight increase in soreness  Functional change: none at this time    Pain: 5/10  Location: bilateral shoulder  L>R    Objective     Christina received therapeutic exercises to develop strength, ROM and flexibility for 45 minutes 1:1 with PTA including:    Date  7/6/2020 6/29/2020 6/26/2020 6/24/2020   VISIT 4/12 3/12 2/12 1/12            POC EXP. DATE 8/24/2020 8/24/2020 8/24/2020 8/24/2020   VISIT AMOUNT  MEDICARE TOTAL 90.96  355.76 90.96  264.80 60.64  173.84 113.20   FACE-TO-FACE 7/24/2020 7/24/2020 7/24/2020 7/24/2020   FOTO 4/5 3/5 2/5 1/5            Pulley for flexion 2' 2' 2' next   TABLE:        Scapular retraction 15 x  "3" 15 x 3"  15 x 3" 10 x 3"   Wand flexion -- -- 1 x 10 1 x 10   Wand ER -- -- -- next   Pec stretch -- -- -- next   Snow michael -- -- -- next   Post shoulder roll 1 x 15 1 x 15 1 x 10     Table slides:  - Flex  - ABD  - ER   1 x 15  1 x 15  1 x 15   1 x 15  1 x 15  1 x 15   1 x 10  1 x 10  1 x 10     STANDING:        Pec stretch 3 x 10" next next next   Functional IR str with strap/towel 3 x 10"      Row 2 x 10 next next next   Corazon's -- next next next   Codman's 20 x ea  CW/CCW next next next   Wall walks Flex/ABD   1 x 5 ea 1 x 5      Mod. Child's pose -- 3 x 5"               Initials SB DG DG MA       Home Exercises Provided and Patient Education Provided     Education provided:   - continued compliance with HEP    Written Home Exercises Provided: yes.  Exercises were reviewed and Christina was able to demonstrate them prior to the end of the session.  Christina demonstrated good  understanding of the education provided.     See EMR under Patient Instructions for exercises provided 6/26/2020.    Assessment     Christina was able to perform all of today's progressions and new exercises with no increase in symptoms prior to leaving the clinic. Pt continues to demo limitations in OH ROM and internal/eternal rotation of B shoulders, although improving each visit.   Christina is progressing well towards her goals.   Pt prognosis is Good.     Pt will continue to benefit from skilled outpatient physical therapy to address the deficits listed in the problem list box on initial evaluation, provide pt/family education and to maximize pt's level of independence in the home and community environment.     Pt's spiritual, cultural and educational needs considered and pt agreeable to plan of care and goals.     Anticipated barriers to physical therapy: none    Goals:   Short Term Goals:  4 weeks  1. Patient will be compliant with HEP to promote the independent management of current diagnosis. In progress, not met  2. Patient will " increase bilateral shoulder flexion to 110 degrees for function of grooming. In progress, not met  3. Patient will increase bilateral shoulder functiona ER to reach C7. In progress, not met  4. Patient will report a decrease in complaints of bilateral shoulder pain to 5/10 during performance of ADL's for independence of self care activities.  In progress, not met        Long Term Goals:  8 weeks  1. Patient will increase bilateral shoulder strength to 4/5 to improve tolerance to normal house work.. In progress, not met  2. Patient will increase bilateral shoulder flexion to 125 degrees in order to place dishes in overhead cabinets independently for self care independence.  In progress, not met  3. Patient will increase bilateral shoulder functional IR to reach L3 for function of dressing. In progress, not met  4. Patient will increase scapular stabilization strength to 4/5 to improve tolerance to normal lifting. In progress, not met  5. Patient will improve FOTO limitation status from 47% to 36%  indicating increased functional mobility. In progress, not met  Plan     Continue with the plan of care. Increase reps with wall walks and begin [place]'s next visit.     Chelly Carter, PTA 1/6

## 2020-07-08 ENCOUNTER — CLINICAL SUPPORT (OUTPATIENT)
Dept: REHABILITATION | Facility: HOSPITAL | Age: 65
End: 2020-07-08
Payer: MEDICARE

## 2020-07-08 DIAGNOSIS — M25.512 CHRONIC PAIN OF BOTH SHOULDERS: ICD-10-CM

## 2020-07-08 DIAGNOSIS — M25.612 DECREASED ROM OF LEFT SHOULDER: ICD-10-CM

## 2020-07-08 DIAGNOSIS — M25.611 DECREASED ROM OF RIGHT SHOULDER: ICD-10-CM

## 2020-07-08 DIAGNOSIS — M62.81 MUSCLE WEAKNESS OF RIGHT ARM: ICD-10-CM

## 2020-07-08 DIAGNOSIS — M62.81 MUSCLE WEAKNESS OF LEFT ARM: ICD-10-CM

## 2020-07-08 DIAGNOSIS — G89.29 CHRONIC PAIN OF BOTH SHOULDERS: ICD-10-CM

## 2020-07-08 DIAGNOSIS — M25.511 CHRONIC PAIN OF BOTH SHOULDERS: ICD-10-CM

## 2020-07-08 PROCEDURE — 97110 THERAPEUTIC EXERCISES: CPT | Mod: PO

## 2020-07-08 NOTE — PROGRESS NOTES
Physical Therapy Treatment Note     Name: Christina Tomlinson  Clinic Number: [id number]    Therapy Diagnosis:   Encounter Diagnoses   Name Primary?    Chronic pain of both shoulders     Decreased ROM of right shoulder     Decreased ROM of left shoulder     Muscle weakness of left arm     Muscle weakness of right arm      Physician: Everette Aguirre MD    Visit Date: 7/8/2020    Physician Orders: PT Eval and Treat   Medical Diagnosis from Referral:   M75.101 (ICD-10-CM) - Unspecified rotator cuff tear or rupture of right shoulder, not specified as traumatic   M12.811 (ICD-10-CM) - Other specific arthropathies, not elsewhere classified, right shoulder   M12.812 (ICD-10-CM) - Other specific arthropathies, not elsewhere classified, left shoulder   M75.102 (ICD-10-CM) - Unspecified rotator cuff tear or rupture of left shoulder, not specified as traumatic   Evaluation Date: 6/24/2020  Authorization Period Expiration: 08/19/2020  Plan of Care Expiration: 8/24/2020  Visit # / Visits authorized: 5 / 12    Time In: 10:30 am  Time Out: 10:15 am  Total Billable Time: 45 minutes    Precautions: Standard    Subjective     Pt reports:she is experiencing her usual pain   She was compliant with home exercise program.  Response to previous treatment: slight increase in soreness  Functional change: none at this time    Pain: 6/10  Location: bilateral shoulder  L>R    Objective     Christina received therapeutic exercises to develop strength, ROM and flexibility for 45 minutes 1:1 with PT including:    Date  07/08/2020 7/6/2020 6/29/2020 6/26/2020 6/24/2020   VISIT 5/12 4/12 3/12 2/12 1/12             POC EXP. DATE 8/24/2020 8/24/2020 8/24/2020 8/24/2020 8/24/2020   VISIT AMOUNT  MEDICARE TOTAL 90.96  446.72 90.96  355.76 90.96  264.80 60.64  173.84 113.20   FACE-TO-FACE 7/24/2020 7/24/2020 7/24/2020 7/24/2020 7/24/2020   FOTO 5/5 4/5 3/5 2/5 1/5             Pulley for flexion 2' 2' 2' 2' next   TABLE:         Scapular retraction  "15 x 3" 15 x 3" 15 x 3"  15 x 3" 10 x 3"   Wand flexion -- -- -- 1 x 10 1 x 10   Wand ER -- -- -- -- next   Pec stretch -- -- -- -- next   Snow michael -- -- -- -- next   Post shoulder roll 1 x 15  1 x 15 1 x 15 1 x 10     Table slides:  - Flex  - ABD  - ER   2 x 10  2 x 10  2 x 10     1 x 15  1 x 15  1 x 15   1 x 15  1 x 15  1 x 15   1 x 10  1 x 10  1 x 10     STANDING:         Pec stretch 5 x 10"   Arms low  3 x 10" next next next   Functional IR str with strap/towel NT 3 x 10"      Row 2 x 10 RTB 2 x 10 next next next   Avenal's -- -- next next next   Codman's NT 20 x ea  CW/CCW next next next   Wall walks Flex/ABD  1 x 10  Flex/ABD   1 x 5 ea 1 x 5      Mod. Child's pose -- -- 3 x 5"      AROM  - flexion   - abduction   - scaption    1 x 5  1 x 5  1 x 5                         Initials BJ SB DG DG MA       Home Exercises Provided and Patient Education Provided     Education provided:   - continued compliance with HEP    Written Home Exercises Provided: yes.  Exercises were reviewed and Christina was able to demonstrate them prior to the end of the session.  Christina demonstrated good  understanding of the education provided.     See EMR under Patient Instructions for exercises provided 6/26/2020.    Assessment     Christina was able to perform all of today's progressions and new exercises with no increase in symptoms prior to leaving the clinic.Christina was able to increase her repetitions in exercise today and progress with newly added AROM in 3 planes of movement. Functional limitation reporting disability percentage was obtained through use of FOTO Shoulder Survey indicating 50% disability.     Christina is progressing well towards her goals.   Pt prognosis is Good.     Pt will continue to benefit from skilled outpatient physical therapy to address the deficits listed in the problem list box on initial evaluation, provide pt/family education and to maximize pt's level of independence in the home and community environment. "     Pt's spiritual, cultural and educational needs considered and pt agreeable to plan of care and goals.     Anticipated barriers to physical therapy: none    Goals:   Short Term Goals:  4 weeks  1. Patient will be compliant with HEP to promote the independent management of current diagnosis. In progress, not met  2. Patient will increase bilateral shoulder flexion to 110 degrees for function of grooming. In progress, not met  3. Patient will increase bilateral shoulder functiona ER to reach C7. In progress, not met  4. Patient will report a decrease in complaints of bilateral shoulder pain to 5/10 during performance of ADL's for independence of self care activities.  In progress, not met        Long Term Goals:  8 weeks  1. Patient will increase bilateral shoulder strength to 4/5 to improve tolerance to normal house work.. In progress, not met  2. Patient will increase bilateral shoulder flexion to 125 degrees in order to place dishes in overhead cabinets independently for self care independence.  In progress, not met  3. Patient will increase bilateral shoulder functional IR to reach L3 for function of dressing. In progress, not met  4. Patient will increase scapular stabilization strength to 4/5 to improve tolerance to normal lifting. In progress, not met  5. Patient will improve FOTO limitation status from 47% to 36%  indicating increased functional mobility. In progress, not met  Plan     Continue with the plan of care. Begin [place]'s next visit.     Yoselin Walton, PT, DPT

## 2020-07-09 ENCOUNTER — TELEPHONE (OUTPATIENT)
Dept: CARDIOLOGY | Facility: CLINIC | Age: 65
End: 2020-07-09

## 2020-07-09 NOTE — TELEPHONE ENCOUNTER
----- Message from Renée Castellon sent at 7/9/2020  1:04 PM CDT -----  Contact: Patient  Type:  RX Refill Request    Who Called: Patient  Refill or New Rx:refill  RX Name and Strength:valsartan (DIOVAN) 160 MG   How is the patient currently taking it? (ex. 1XDay):   Is this a 30 day or 90 day RX:   Preferred Pharmacy with phone number:[facility] [address] [place] [address] [facility] [phone] (Phone)  [phone] (Fax      Local or Mail Order:   Ordering Provider:   Would the patient rather a call back or a response via [facility]?    Best Call Back Number:[phone]  Additional Information:

## 2020-07-13 ENCOUNTER — TELEPHONE (OUTPATIENT)
Dept: CARDIOLOGY | Facility: CLINIC | Age: 65
End: 2020-07-13

## 2020-07-13 ENCOUNTER — CLINICAL SUPPORT (OUTPATIENT)
Dept: REHABILITATION | Facility: HOSPITAL | Age: 65
End: 2020-07-13
Payer: MEDICARE

## 2020-07-13 DIAGNOSIS — M62.81 MUSCLE WEAKNESS OF RIGHT ARM: ICD-10-CM

## 2020-07-13 DIAGNOSIS — G89.29 CHRONIC PAIN OF BOTH SHOULDERS: ICD-10-CM

## 2020-07-13 DIAGNOSIS — M25.512 CHRONIC PAIN OF BOTH SHOULDERS: ICD-10-CM

## 2020-07-13 DIAGNOSIS — M62.81 MUSCLE WEAKNESS OF LEFT ARM: ICD-10-CM

## 2020-07-13 DIAGNOSIS — M25.612 DECREASED ROM OF LEFT SHOULDER: ICD-10-CM

## 2020-07-13 DIAGNOSIS — M25.611 DECREASED ROM OF RIGHT SHOULDER: ICD-10-CM

## 2020-07-13 DIAGNOSIS — M25.511 CHRONIC PAIN OF BOTH SHOULDERS: ICD-10-CM

## 2020-07-13 PROCEDURE — 97110 THERAPEUTIC EXERCISES: CPT | Mod: PO

## 2020-07-13 NOTE — TELEPHONE ENCOUNTER
----- Message from Mary Barker sent at 7/13/2020  8:57 AM CDT -----  Pt is calling and stating that she has an rx for valsartan (DIOVAN) 160 MG tablet 90 tablet 3 9/18/2019 9/17/2020 No  Sig - Route: Take 1 tablet (160 mg total) by mouth once daily. - Oral  Patient not taking: Reported on 1/8/2020       Sent to pharmacy as: valsartan (DIOVAN) 160 MG tablet  Class: Normal  Order: [id number]  Date/Time Signed: 9/18/2019 08:35      E-Prescribing Status: Receipt confirmed by pharmacy (9/18/2019  8:35 AM CDT    She's stating that the pharmacy does not have it in stock and would like for the nurse to give her a call back casue she only have 1 pill left

## 2020-07-13 NOTE — PROGRESS NOTES
Physical Therapy Treatment Note     Name: Christina Tomlinson  Clinic Number: [id number]    Therapy Diagnosis:   Encounter Diagnoses   Name Primary?    Chronic pain of both shoulders     Decreased ROM of right shoulder     Decreased ROM of left shoulder     Muscle weakness of left arm     Muscle weakness of right arm      Physician: Everette Aguirre MD    Visit Date: 7/13/2020    Physician Orders: PT Eval and Treat   Medical Diagnosis from Referral:   M75.101 (ICD-10-CM) - Unspecified rotator cuff tear or rupture of right shoulder, not specified as traumatic   M12.811 (ICD-10-CM) - Other specific arthropathies, not elsewhere classified, right shoulder   M12.812 (ICD-10-CM) - Other specific arthropathies, not elsewhere classified, left shoulder   M75.102 (ICD-10-CM) - Unspecified rotator cuff tear or rupture of left shoulder, not specified as traumatic   Evaluation Date: 6/24/2020  Authorization Period Expiration: 08/19/2020  Plan of Care Expiration: 8/24/2020  Visit # / Visits authorized: 6 / 12    Time In: 10:30 am  Time Out: 11:15 am  Total Billable Time: 43 minutes    Precautions: Standard    Subjective     Pt reports:she is experiencing her usual pain, but states its really not that bad   She was compliant with home exercise program.  Response to previous treatment: slight increase in soreness  Functional change: feeling that her shoulders are getting better and her ROM is improving     Pain: 6/10  Location: bilateral shoulder  L>R    Objective     Christina received therapeutic exercises to develop strength, ROM and flexibility for 43 minutes 1:1 with PT including:    Date  07/13/2020 07/08/2020 7/6/2020 6/29/2020 6/26/2020 6/24/2020   VISIT 6/12 5/12 4/12 3/12 2/12 1/12              POC EXP. DATE 8/24/2020 8/24/2020 8/24/2020 8/24/2020 8/24/2020 8/24/2020   VISIT AMOUNT  MEDICARE TOTAL 90.96  537.68 90.96  446.72 90.96  355.76 90.96  264.80 60.64  173.84 113.20   FACE-TO-FACE 7/24/2020 7/24/2020 7/24/2020  "7/24/2020 7/24/2020 7/24/2020   FOTO -- 5/5 4/5 3/5 2/5 1/5              Pulley for flexion 2' 2' 2' 2' 2' next   TABLE:          Scapular retraction 20 x 3" 15 x 3" 15 x 3" 15 x 3"  15 x 3" 10 x 3"   Wand flexion -- -- -- -- 1 x 10 1 x 10   Wand ER -- -- -- -- -- next   Pec stretch -- -- -- -- -- next   Snow michael -- -- -- -- -- next   Post shoulder roll 2 x 10  1 x 15  1 x 15 1 x 15 1 x 10     Table slides:  - Flex  - ABD  - ER   2 x 10  2 x 10   2 x 10     2 x 10  2 x 10  2 x 10     1 x 15  1 x 15  1 x 15   1 x 15  1 x 15  1 x 15   1 x 10  1 x 10  1 x 10     STANDING:          Pec stretch 5 x 10"   Arms low  5 x 10"   Arms low  3 x 10" next next next   Functional IR str with strap/towel NT NT 3 x 10"      Row 2 x 10 RTB 2 x 10 RTB 2 x 10 next next next   Corazon's 1 x 10 ea RTB -- -- next next next   Codman's NT NT 20 x ea  CW/CCW next next next   Wall walks Flex/ABD  1 x 10  Flex/ABD  1 x 10  Flex/ABD   1 x 5 ea 1 x 5      Mod. Child's pose  -- -- 3 x 5"      AROM  - flexion   - abduction   - scaption    1 x 10  1 x 10   1 x 10     1 x 5  1 x 5  1 x 5                           Initials BJ BJ SB DG DG MA       Home Exercises Provided and Patient Education Provided     Education provided:   - continued compliance with HEP    Written Home Exercises Provided: yes.  Exercises were reviewed and Christina was able to demonstrate them prior to the end of the session.  Christina demonstrated good  understanding of the education provided.     See EMR under Patient Instructions for exercises provided 6/26/2020.    Assessment     Christina was able to perform all of today's progressions and new exercises with no increase in symptoms prior to leaving the clinic.Christina's AROM is improving with less overall symptoms and difficulty. Will continue to progress patient as tolerated.      Christina is progressing well towards her goals.   Pt prognosis is Good.     Pt will continue to benefit from skilled outpatient physical therapy to address " the deficits listed in the problem list box on initial evaluation, provide pt/family education and to maximize pt's level of independence in the home and community environment.     Pt's spiritual, cultural and educational needs considered and pt agreeable to plan of care and goals.     Anticipated barriers to physical therapy: none    Goals:   Short Term Goals:  4 weeks  1. Patient will be compliant with HEP to promote the independent management of current diagnosis. In progress, not met  2. Patient will increase bilateral shoulder flexion to 110 degrees for function of grooming. In progress, not met  3. Patient will increase bilateral shoulder functiona ER to reach C7. In progress, not met  4. Patient will report a decrease in complaints of bilateral shoulder pain to 5/10 during performance of ADL's for independence of self care activities.  In progress, not met        Long Term Goals:  8 weeks  1. Patient will increase bilateral shoulder strength to 4/5 to improve tolerance to normal house work.. In progress, not met  2. Patient will increase bilateral shoulder flexion to 125 degrees in order to place dishes in overhead cabinets independently for self care independence.  In progress, not met  3. Patient will increase bilateral shoulder functional IR to reach L3 for function of dressing. In progress, not met  4. Patient will increase scapular stabilization strength to 4/5 to improve tolerance to normal lifting. In progress, not met  5. Patient will improve FOTO limitation status from 47% to 36%  indicating increased functional mobility. In progress, not met  Plan     Continue with the plan of care.     Yoselin Walton, PT, DPT

## 2020-07-15 ENCOUNTER — CLINICAL SUPPORT (OUTPATIENT)
Dept: REHABILITATION | Facility: HOSPITAL | Age: 65
End: 2020-07-15
Payer: MEDICARE

## 2020-07-15 DIAGNOSIS — M25.511 CHRONIC PAIN OF BOTH SHOULDERS: ICD-10-CM

## 2020-07-15 DIAGNOSIS — M62.81 MUSCLE WEAKNESS OF LEFT ARM: ICD-10-CM

## 2020-07-15 DIAGNOSIS — M62.81 MUSCLE WEAKNESS OF RIGHT ARM: ICD-10-CM

## 2020-07-15 DIAGNOSIS — M25.512 CHRONIC PAIN OF BOTH SHOULDERS: ICD-10-CM

## 2020-07-15 DIAGNOSIS — M25.611 DECREASED ROM OF RIGHT SHOULDER: ICD-10-CM

## 2020-07-15 DIAGNOSIS — G89.29 CHRONIC PAIN OF BOTH SHOULDERS: ICD-10-CM

## 2020-07-15 DIAGNOSIS — M25.612 DECREASED ROM OF LEFT SHOULDER: ICD-10-CM

## 2020-07-15 PROCEDURE — 97110 THERAPEUTIC EXERCISES: CPT | Mod: PO

## 2020-07-15 NOTE — TELEPHONE ENCOUNTER
Called the pt in regards to this message.   The pt stated she called her PCP and Dr. Castillo sent the Rx refill to [facility].   The pt picked up the medication today and stated we can cancel this refill request.     ND

## 2020-07-15 NOTE — TELEPHONE ENCOUNTER
Please check if patient can  from [place] pharmacy, or if there is another pharmacy closer to her I can call it in there. [facility]  has it in stock thanks

## 2020-07-15 NOTE — PROGRESS NOTES
Physical Therapy Treatment Note     Name: Chirstina Tomlinson  Clinic Number: [id number]    Therapy Diagnosis:   Encounter Diagnoses   Name Primary?    Chronic pain of both shoulders     Decreased ROM of right shoulder     Decreased ROM of left shoulder     Muscle weakness of left arm     Muscle weakness of right arm      Physician: Everette Aguirre MD    Visit Date: 7/15/2020    Physician Orders: PT Eval and Treat   Medical Diagnosis from Referral:   M75.101 (ICD-10-CM) - Unspecified rotator cuff tear or rupture of right shoulder, not specified as traumatic   M12.811 (ICD-10-CM) - Other specific arthropathies, not elsewhere classified, right shoulder   M12.812 (ICD-10-CM) - Other specific arthropathies, not elsewhere classified, left shoulder   M75.102 (ICD-10-CM) - Unspecified rotator cuff tear or rupture of left shoulder, not specified as traumatic   Evaluation Date: 6/24/2020  Authorization Period Expiration: 08/19/2020  Plan of Care Expiration: 8/24/2020  Visit # / Visits authorized: 7 / 12    Time In: 10:20 am  Time Out: 10:55 am   Total Billable Time: 33 minutes    Precautions: Standard    Subjective     Pt reports:doing better today, but finds she is having to bend her elbow when lifting up her left arm.   She was compliant with home exercise program.  Response to previous treatment: slight increase in soreness  Functional change: feeling that her shoulders are getting better and her ROM is improving     Pain: 5/10  Location: bilateral shoulder  L>R    Objective     Christina received therapeutic exercises to develop strength, ROM and flexibility for 33 minutes 1:1 with PT including:    Date  07/15/2020 07/13/2020 07/08/2020 7/6/2020 6/29/2020 6/26/2020 6/24/2020   VISIT 7/12 6/12 5/12 4/12 3/12 2/12 1/12               POC EXP. DATE 8/24/2020 8/24/2020 8/24/2020 8/24/2020 8/24/2020 8/24/2020 8/24/2020   VISIT AMOUNT  MEDICARE TOTAL 60.64  598.52 90.96  537.68 90.96  446.72 90.96  355.76 90.96  264.80  "60.64  173.84 113.20   FACE-TO-FACE 7/24/2020 7/24/2020 7/24/2020 7/24/2020 7/24/2020 7/24/2020 7/24/2020   FOTO -- -- 5/5 4/5 3/5 2/5 1/5               Pulley for flexion 2' 2' 2' 2' 2' 2' next   TABLE:           Scapular retraction 20 x 3" 20 x 3" 15 x 3" 15 x 3" 15 x 3"  15 x 3" 10 x 3"   Wand flexion -- -- -- -- -- 1 x 10 1 x 10   Wand ER -- -- -- -- -- -- next   Pec stretch -- -- -- -- -- -- next   Snow michael -- -- -- -- -- -- next   Post shoulder roll 2 x10 2 x 10  1 x 15  1 x 15 1 x 15 1 x 10     Table slides:  - Flex  - ABD  - ER   Not today  Not today  Not today   2 x 10  2 x 10   2 x 10     2 x 10  2 x 10  2 x 10     1 x 15  1 x 15  1 x 15   1 x 15  1 x 15  1 x 15   1 x 10  1 x 10  1 x 10     STANDING:           Pec stretch 5 x 10"   Arms low 5 x 10"   Arms low  5 x 10"   Arms low  3 x 10" next next next   Functional IR str with strap/towel 1 x 10 NT NT 3 x 10"      Row 2 x 10 RTB 2 x 10 RTB 2 x 10 RTB 2 x 10 next next next   Corazon's Not today 1 x 10 ea RTB -- -- next next next   Codman's Not today NT NT 20 x ea  CW/CCW next next next   Wall walks Flex/abd  1 x 10 Flex/ABD  1 x 10  Flex/ABD  1 x 10  Flex/ABD   1 x 5 ea 1 x 5      Mod. Child's pose -  -- -- 3 x 5"      AROM  - flexion   - abduction   - scaption    1 x 10  1 x 10  1 x 10   1 x 10  1 x 10   1 x 10     1 x 5  1 x 5  1 x 5       ER B 1 x 10 YTB          D1 flexion 1 x 10          Initials DG BJ BJ SB DG DG MA       Home Exercises Provided and Patient Education Provided     Education provided:   - continued compliance with HEP    Written Home Exercises Provided: yes.  Exercises were reviewed and Christina was able to demonstrate them prior to the end of the session.  Christina demonstrated good  understanding of the education provided.     See EMR under Patient Instructions for exercises provided 6/26/2020.    Assessment     Christina continues to require verbal and tactile cues occasionally as she fatigues to avoid substitutions with all of her " exercises. She was able to perform all of today's new exercises with no increase in symptoms prior to leaving the clinic. She continues to be limited in her ADLs by her weakness and decreased ROM.      Christina is progressing well towards her goals.   Pt prognosis is Good.     Pt will continue to benefit from skilled outpatient physical therapy to address the deficits listed in the problem list box on initial evaluation, provide pt/family education and to maximize pt's level of independence in the home and community environment.     Pt's spiritual, cultural and educational needs considered and pt agreeable to plan of care and goals.     Anticipated barriers to physical therapy: none    Goals:   Short Term Goals:  4 weeks  1. Patient will be compliant with HEP to promote the independent management of current diagnosis. In progress, not met  2. Patient will increase bilateral shoulder flexion to 110 degrees for function of grooming. In progress, not met  3. Patient will increase bilateral shoulder functiona ER to reach C7. In progress, not met  4. Patient will report a decrease in complaints of bilateral shoulder pain to 5/10 during performance of ADL's for independence of self care activities.  In progress, not met        Long Term Goals:  8 weeks  1. Patient will increase bilateral shoulder strength to 4/5 to improve tolerance to normal house work.. In progress, not met  2. Patient will increase bilateral shoulder flexion to 125 degrees in order to place dishes in overhead cabinets independently for self care independence.  In progress, not met  3. Patient will increase bilateral shoulder functional IR to reach L3 for function of dressing. In progress, not met  4. Patient will increase scapular stabilization strength to 4/5 to improve tolerance to normal lifting. In progress, not met  5. Patient will improve FOTO limitation status from 47% to 36%  indicating increased functional mobility. In progress, not met  Plan      Continue with the plan of care. Resume all of her usual exercises next visit.     Cora Degroot, PT, DPT

## 2020-07-20 ENCOUNTER — CLINICAL SUPPORT (OUTPATIENT)
Dept: REHABILITATION | Facility: HOSPITAL | Age: 65
End: 2020-07-20
Payer: MEDICARE

## 2020-07-20 DIAGNOSIS — M25.612 DECREASED ROM OF LEFT SHOULDER: ICD-10-CM

## 2020-07-20 DIAGNOSIS — M25.512 CHRONIC PAIN OF BOTH SHOULDERS: ICD-10-CM

## 2020-07-20 DIAGNOSIS — M25.511 CHRONIC PAIN OF BOTH SHOULDERS: ICD-10-CM

## 2020-07-20 DIAGNOSIS — M62.81 MUSCLE WEAKNESS OF RIGHT ARM: ICD-10-CM

## 2020-07-20 DIAGNOSIS — M62.81 MUSCLE WEAKNESS OF LEFT ARM: ICD-10-CM

## 2020-07-20 DIAGNOSIS — G89.29 CHRONIC PAIN OF BOTH SHOULDERS: ICD-10-CM

## 2020-07-20 DIAGNOSIS — M25.611 DECREASED ROM OF RIGHT SHOULDER: ICD-10-CM

## 2020-07-20 PROCEDURE — 97110 THERAPEUTIC EXERCISES: CPT | Mod: PO

## 2020-07-20 NOTE — PROGRESS NOTES
Physical Therapy Treatment Note     Name: Christina Tomlinson  Clinic Number: [id number]    Therapy Diagnosis:   Encounter Diagnoses   Name Primary?    Chronic pain of both shoulders     Decreased ROM of right shoulder     Decreased ROM of left shoulder     Muscle weakness of left arm     Muscle weakness of right arm      Physician: Everette Aguirre MD    Visit Date: 7/20/2020    Physician Orders: PT Eval and Treat   Medical Diagnosis from Referral:   M75.101 (ICD-10-CM) - Unspecified rotator cuff tear or rupture of right shoulder, not specified as traumatic   M12.811 (ICD-10-CM) - Other specific arthropathies, not elsewhere classified, right shoulder   M12.812 (ICD-10-CM) - Other specific arthropathies, not elsewhere classified, left shoulder   M75.102 (ICD-10-CM) - Unspecified rotator cuff tear or rupture of left shoulder, not specified as traumatic   Evaluation Date: 6/24/2020  Authorization Period Expiration: 08/19/2020  Plan of Care Expiration: 8/24/2020  Visit # / Visits authorized: 8 / 12    Time In: 10:10 am  Time Out: 10:50 am   Total Billable Time: 45 minutes    Precautions: Standard    Subjective     Pt reports: her arthritis has flared up all weekend with pain in knees and hands.  She reports shoulder pain is less and has improving mobility of shoulders.  She has been compliant with HEP.    She was compliant with home exercise program.  Response to previous treatment: slight increase in soreness  Functional change: feeling that her shoulders are getting better and her ROM is improving     Pain: 5/10  Location: bilateral shoulder  L>R    Objective     Christina received therapeutic exercises to develop strength, ROM and flexibility for 40 minutes 1:1 with PT including:    Date  7/20/2020 07/15/2020 07/13/2020 07/08/2020 7/6/2020 6/29/2020 6/26/2020 6/24/2020   VISIT 8/12 7/12 6/12 5/12 4/12 3/12 2/12 1/12                POC EXP. DATE 8/24/2020 8/24/2020 8/24/2020 8/24/2020 8/24/2020 8/24/2020  "8/24/2020 8/24/2020   VISIT AMOUNT  MEDICARE TOTAL 90.96  689.48 60.64  598.52 90.96  537.68 90.96  446.72 90.96  355.76 90.96  264.80 60.64  173.84 113.20   FACE-TO-FACE 7/24/2020 7/24/2020 7/24/2020 7/24/2020 7/24/2020 7/24/2020 7/24/2020 7/24/2020   FOTO -- -- -- 5/5 4/5 3/5 2/5 1/5                Pulley for flexion 2' 2' 2' 2' 2' 2' 2' next   TABLE:            Scapular retraction 20 x 3" 20 x 3" 20 x 3" 15 x 3" 15 x 3" 15 x 3"  15 x 3" 10 x 3"   Wand flexion -- -- -- -- -- -- 1 x 10 1 x 10   Post shoulder roll 2 x 10 2 x10 2 x 10  1 x 15  1 x 15 1 x 15 1 x 10     Table slides:  - Flex  - ABD  - ER   2 x 10  2 x 10   2 x 10     Not today  Not today  Not today   2 x 10  2 x 10   2 x 10     2 x 10  2 x 10  2 x 10     1 x 15  1 x 15  1 x 15   1 x 15  1 x 15  1 x 15   1 x 10  1 x 10  1 x 10     STANDING:            Pec stretch 5 x 10"  Arms low 5 x 10"   Arms low 5 x 10"   Arms low  5 x 10"   Arms low  3 x 10" next next next   Functional IR str with strap/towel 1 x 10 1 x 10 NT NT 3 x 10"      Row 2 x 10 GTB 2 x 10 RTB 2 x 10 RTB 2 x 10 RTB 2 x 10 next next next   Corazon's NT Not today 1 x 10 ea RTB -- -- next next next   Codman's NT Not today NT NT 20 x ea  CW/CCW next next next   Wall walks Flex  1 x 10 Flex/abd  1 x 10 Flex/ABD  1 x 10  Flex/ABD  1 x 10  Flex/ABD   1 x 5 ea 1 x 5      Mod. Child's pose -- -  -- -- 3 x 5"      AROM  - flexion   - abduction   - scaption    1 x 20  1 x 10  1 x 10   1 x 10  1 x 10  1 x 10   1 x 10  1 x 10   1 x 10     1 x 5  1 x 5  1 x 5       ER B 1 x 10 YTB B 1 x 10 YTB          D1 flexion 1 x 10 1 x 10          Initials CHERYLE HINES BJ BJ SB DG DG MA     Pt has 118 degrees active shoulder flexion bilaterally.    Home Exercises Provided and Patient Education Provided     Education provided:   - continued compliance with HEP    Written Home Exercises Provided: yes.  Exercises were reviewed and Christina was able to demonstrate them prior to the end of the session.  Christina demonstrated good  " understanding of the education provided.     See EMR under Patient Instructions for exercises provided 6/26/2020.    Assessment     Christina is noted to have improved AROM of bilateral shoulders and is reporting improved functional mobility during dressing.  She has been compliant with HEP and achieves STG's #1 and 2.  Pt will continue with progression of AROM and shoulder strengthening exercises as tolerated.        Christina is progressing well towards her goals.   Pt prognosis is Good.     Pt will continue to benefit from skilled outpatient physical therapy to address the deficits listed in the problem list box on initial evaluation, provide pt/family education and to maximize pt's level of independence in the home and community environment.     Pt's spiritual, cultural and educational needs considered and pt agreeable to plan of care and goals.     Anticipated barriers to physical therapy: none    Goals:   Short Term Goals:  4 weeks  1. Patient will be compliant with HEP to promote the independent management of current diagnosis. met  2. Patient will increase bilateral shoulder flexion to 110 degrees for function of grooming. met  3. Patient will increase bilateral shoulder functiona ER to reach C7. In progress, not met  4. Patient will report a decrease in complaints of bilateral shoulder pain to 5/10 during performance of ADL's for independence of self care activities.  In progress, not met        Long Term Goals:  8 weeks  1. Patient will increase bilateral shoulder strength to 4/5 to improve tolerance to normal house work.. In progress, not met  2. Patient will increase bilateral shoulder flexion to 125 degrees in order to place dishes in overhead cabinets independently for self care independence.  In progress, not met  3. Patient will increase bilateral shoulder functional IR to reach L3 for function of dressing. In progress, not met  4. Patient will increase scapular stabilization strength to 4/5 to improve  tolerance to normal lifting. In progress, not met  5. Patient will improve FOTO limitation status from 47% to 36%  indicating increased functional mobility. In progress, not met  Plan     Continue with the plan of care. Attempt progression to 2 sets of 10 reps during resisted shoulder ER.    Everette Redd, PT, DPT

## 2020-07-22 ENCOUNTER — CLINICAL SUPPORT (OUTPATIENT)
Dept: REHABILITATION | Facility: HOSPITAL | Age: 65
End: 2020-07-22
Payer: MEDICARE

## 2020-07-22 ENCOUNTER — DOCUMENTATION ONLY (OUTPATIENT)
Dept: REHABILITATION | Facility: HOSPITAL | Age: 65
End: 2020-07-22

## 2020-07-22 DIAGNOSIS — G89.29 CHRONIC PAIN OF BOTH SHOULDERS: ICD-10-CM

## 2020-07-22 DIAGNOSIS — M62.81 MUSCLE WEAKNESS OF LEFT ARM: ICD-10-CM

## 2020-07-22 DIAGNOSIS — M25.512 CHRONIC PAIN OF BOTH SHOULDERS: ICD-10-CM

## 2020-07-22 DIAGNOSIS — M25.511 CHRONIC PAIN OF BOTH SHOULDERS: ICD-10-CM

## 2020-07-22 DIAGNOSIS — M25.612 DECREASED ROM OF LEFT SHOULDER: ICD-10-CM

## 2020-07-22 DIAGNOSIS — M62.81 MUSCLE WEAKNESS OF RIGHT ARM: ICD-10-CM

## 2020-07-22 DIAGNOSIS — M25.611 DECREASED ROM OF RIGHT SHOULDER: ICD-10-CM

## 2020-07-22 PROCEDURE — 97110 THERAPEUTIC EXERCISES: CPT | Mod: PO

## 2020-07-22 NOTE — PROGRESS NOTES
Face to Face PTA Conference performed with Chelly Carter PTA regarding patient's current status, overall progress, and plan of care. Pt will be seen by a physical therapist minimally every 6th visit or every 30 days.    Face to Face PTA Conference performed with Everette Redd PT regarding patient's current status, overall progress, and plan of care. Pt will be seen by a physical therapist minimally every 6th visit or every 30 days.    Chelly Carter PTA  7/22/2020

## 2020-07-22 NOTE — PROGRESS NOTES
Physical Therapy Treatment Note     Name: Christina Tomlinson  Clinic Number: [id number]    Therapy Diagnosis:   Encounter Diagnoses   Name Primary?    Chronic pain of both shoulders     Decreased ROM of right shoulder     Decreased ROM of left shoulder     Muscle weakness of left arm     Muscle weakness of right arm      Physician: Everette Aguirre MD    Visit Date: 7/22/2020    Physician Orders: PT Eval and Treat   Medical Diagnosis from Referral:   M75.101 (ICD-10-CM) - Unspecified rotator cuff tear or rupture of right shoulder, not specified as traumatic   M12.811 (ICD-10-CM) - Other specific arthropathies, not elsewhere classified, right shoulder   M12.812 (ICD-10-CM) - Other specific arthropathies, not elsewhere classified, left shoulder   M75.102 (ICD-10-CM) - Unspecified rotator cuff tear or rupture of left shoulder, not specified as traumatic   Evaluation Date: 6/24/2020  Authorization Period Expiration: 08/19/2020  Plan of Care Expiration: 8/24/2020  Visit # / Visits authorized: 9 / 12    Time In: 10:15 am  Time Out: 10:55 am   Total Billable Time: 40 minutes    Precautions: Standard    Subjective     Pt reports: having decrease in arthritic pain of hands and knee as compared to last visit.  She reports having improved movement of shoulders and performing exercises at home most everyday.    She was compliant with home exercise program.  Response to previous treatment: slight increase in soreness  Functional change: feeling that her shoulders are getting better and her ROM is improving     Pain: 5/10  Location: bilateral shoulder  L>R    Objective     Christina received therapeutic exercises to develop strength, ROM and flexibility for 40 minutes 1:1 with PT including:    Date  7/22/2020 7/20/2020 07/15/2020 07/13/2020 07/08/2020 7/6/2020 6/29/2020 6/26/2020 6/24/2020   VISIT 9/12 8/12 7/12 6/12 5/12 4/12 3/12 2/12 1/12                 POC EXP. DATE 8/24/2020 8/24/2020 8/24/2020 8/24/2020 8/24/2020  "8/24/2020 8/24/2020 8/24/2020 8/24/2020   VISIT AMOUNT  MEDICARE TOTAL 90.96  780.44 90.96  689.48 60.64  598.52 90.96  537.68 90.96  446.72 90.96  355.76 90.96  264.80 60.64  173.84 113.20   FACE-TO-FACE 8/22/2020 7/24/2020 7/24/2020 7/24/2020 7/24/2020 7/24/2020 7/24/2020 7/24/2020 7/24/2020   FOTO  -- -- -- 5/5 4/5 3/5 2/5 1/5                 Pulley for flexion 2' 2' 2' 2' 2' 2' 2' 2' next   TABLE:             Scapular retraction 20 x 3" 20 x 3" 20 x 3" 20 x 3" 15 x 3" 15 x 3" 15 x 3"  15 x 3" 10 x 3"   Wand flexion -- -- -- -- -- -- -- 1 x 10 1 x 10   Post shoulder roll 2 x 10 2 x 10 2 x10 2 x 10  1 x 15  1 x 15 1 x 15 1 x 10     Table slides:  - Flex  - ABD  - ER   2 x 10  2 x 10   2 x 10   2 x 10  2 x 10   2 x 10     Not today  Not today  Not today   2 x 10  2 x 10   2 x 10     2 x 10  2 x 10  2 x 10     1 x 15  1 x 15  1 x 15   1 x 15  1 x 15  1 x 15   1 x 10  1 x 10  1 x 10     STANDING:             Pec stretch 5 x 10"  Arms low 5 x 10"  Arms low 5 x 10"   Arms low 5 x 10"   Arms low  5 x 10"   Arms low  3 x 10" next next next   Functional IR str with strap/towel 1 x 10 1 x 10 1 x 10 NT NT 3 x 10"      Row 3 x 10 GTB 2 x 10 GTB 2 x 10 RTB 2 x 10 RTB 2 x 10 RTB 2 x 10 next next next   Corazon's NT NT Not today 1 x 10 ea RTB -- -- next next next   Codman's NT NT Not today NT NT 20 x ea  CW/CCW next next next   Wall walks Flex  1 x 10 Flex  1 x 10 Flex/abd  1 x 10 Flex/ABD  1 x 10  Flex/ABD  1 x 10  Flex/ABD   1 x 5 ea 1 x 5      Mod. Child's pose -- -- -  -- -- 3 x 5"      AROM  - flexion   - abduction   - scaption    1 x 20  1 x 20  1 x 15   1 x 20  1 x 10  1 x 10   1 x 10  1 x 10  1 x 10   1 x 10  1 x 10   1 x 10     1 x 5  1 x 5  1 x 5       ER B 1 x 15 YTB B 1 x 10 YTB B 1 x 10 YTB          D1 flexion 1 x 15 1 x 10 1 x 10          Initials CHERYLE HINES MA     Right shoulder Functional ER: reaches to C4  Left shoulder functional ER: reaches to C7    Home Exercises Provided and Patient Education " Provided     Education provided:   - continued compliance with HEP    Written Home Exercises Provided: yes.  Exercises were reviewed and Christina was able to demonstrate them prior to the end of the session.  Christina demonstrated good  understanding of the education provided.     See EMR under Patient Instructions for exercises provided 6/26/2020.    Assessment     Christina is noted to have improved functional ER and continues to progress with shoulder exercises.  She continues to have limited strength and AROM of bilateral shoulders causing some difficulty performing grooming/dressing.  Pt will continue with progression of repetitions during AROM and shoulder strengthening exercises.      Christina is progressing well towards her goals.   Pt prognosis is Good.     Pt will continue to benefit from skilled outpatient physical therapy to address the deficits listed in the problem list box on initial evaluation, provide pt/family education and to maximize pt's level of independence in the home and community environment.     Pt's spiritual, cultural and educational needs considered and pt agreeable to plan of care and goals.     Anticipated barriers to physical therapy: none    Goals:   Short Term Goals:  4 weeks  1. Patient will be compliant with HEP to promote the independent management of current diagnosis. met  2. Patient will increase bilateral shoulder flexion to 110 degrees for function of grooming. met  3. Patient will increase bilateral shoulder functiona ER to reach C7. partially met (right reaches C4)  4. Patient will report a decrease in complaints of bilateral shoulder pain to 5/10 during performance of ADL's for independence of self care activities.  In progress, not met        Long Term Goals:  8 weeks  1. Patient will increase bilateral shoulder strength to 4/5 to improve tolerance to normal house work.. In progress, not met  2. Patient will increase bilateral shoulder flexion to 125 degrees in order to place  dishes in overhead cabinets independently for self care independence.  In progress, not met  3. Patient will increase bilateral shoulder functional IR to reach L3 for function of dressing. In progress, not met  4. Patient will increase scapular stabilization strength to 4/5 to improve tolerance to normal lifting. In progress, not met  5. Patient will improve FOTO limitation status from 47% to 36%  indicating increased functional mobility. In progress, not met  Plan     Continue with the plan of care. Attempt progression to 2 sets of 10 reps during resisted shoulder ER.    Everette Redd, PT, DPT

## 2020-07-24 NOTE — PROGRESS NOTES
Physical Therapy Treatment Note     Name: Christina Tomlinson  Clinic Number: [id number]    Therapy Diagnosis:   Encounter Diagnoses   Name Primary?    Chronic pain of both shoulders Yes    Decreased ROM of right shoulder     Decreased ROM of left shoulder     Muscle weakness of left arm     Muscle weakness of right arm      Physician: Everette Aguirre MD    Visit Date: 7/27/2020    Physician Orders: PT Eval and Treat   Medical Diagnosis from Referral:   M75.101 (ICD-10-CM) - Unspecified rotator cuff tear or rupture of right shoulder, not specified as traumatic   M12.811 (ICD-10-CM) - Other specific arthropathies, not elsewhere classified, right shoulder   M12.812 (ICD-10-CM) - Other specific arthropathies, not elsewhere classified, left shoulder   M75.102 (ICD-10-CM) - Unspecified rotator cuff tear or rupture of left shoulder, not specified as traumatic   Evaluation Date: 6/24/2020  Authorization Period Expiration: 08/19/2020  Plan of Care Expiration: 8/24/2020  Visit # / Visits authorized: 10 / 12    Time In: 10:35 am  Time Out: 11:15 am   Total Billable Time: 40 minutes    Precautions: Standard    Subjective     Pt reports: She reports having improved movement of shoulders.  She was compliant with home exercise program.  Response to previous treatment: slight increase in soreness  Functional change: improved ROM in shoulders    Pain: 4/10  Location: bilateral shoulder  L>R    Objective     Christina received therapeutic exercises to develop strength, ROM and flexibility for 40 minutes 1:1 with PTA including:    Date  7/27/2020 7/22/2020 7/20/2020 07/15/2020 07/13/2020 07/08/2020 7/6/2020 6/29/2020 6/26/2020 6/24/2020   VISIT 10/12 9/12 8/12 7/12 6/12 5/12 4/12 3/12 2/12 1/12                  POC EXP. DATE 8/24/2020 8/24/2020 8/24/2020 8/24/2020 8/24/2020 8/24/2020 8/24/2020 8/24/2020 8/24/2020 8/24/2020   VISIT AMOUNT  MEDICARE TOTAL 90.96  871.40 90.96  780.44 90.96  689.48 60.64  598.52 90.96  537.68  "90.96  446.72 90.96  355.76 90.96  264.80 60.64  173.84 113.20   FACE-TO-FACE 8/22/2020 8/22/2020 7/24/2020 7/24/2020 7/24/2020 7/24/2020 7/24/2020 7/24/2020 7/24/2020 7/24/2020   FOTO --  -- -- -- 5/5 4/5 3/5 2/5 1/5                  Pulley for flexion 2' 2' 2' 2' 2' 2' 2' 2' 2' next   TABLE:              Scapular retraction 20 x 3" 20 x 3" 20 x 3" 20 x 3" 20 x 3" 15 x 3" 15 x 3" 15 x 3"  15 x 3" 10 x 3"   Wand flexion -- -- -- -- -- -- -- -- 1 x 10 1 x 10   Post shoulder roll 2 x10 2 x 10 2 x 10 2 x10 2 x 10  1 x 15  1 x 15 1 x 15 1 x 10     Table slides:  - Flex  - ABD  - ER Wall  1 x 5  1 x 5  --   2 x 10  2 x 10   2 x 10   2 x 10  2 x 10   2 x 10     Not today  Not today  Not today   2 x 10  2 x 10   2 x 10     2 x 10  2 x 10  2 x 10     1 x 15  1 x 15  1 x 15   1 x 15  1 x 15  1 x 15   1 x 10  1 x 10  1 x 10     STANDING:              Pec stretch 5 x 10"  Arms low 5 x 10"  Arms low 5 x 10"  Arms low 5 x 10"   Arms low 5 x 10"   Arms low  5 x 10"   Arms low  3 x 10" next next next   Functional IR str with strap/towel 5 x 10" B 1 x 10 1 x 10 1 x 10 NT NT 3 x 10"      Row 3 x 10 GTB 3 x 10 GTB 2 x 10 GTB 2 x 10 RTB 2 x 10 RTB 2 x 10 RTB 2 x 10 next next next   I's 2 x 10 RTB            Corazon's NT NT NT Not today 1 x 10 ea RTB -- -- next next next   Codman's NT NT NT Not today NT NT 20 x ea  CW/CCW next next next   Wall walks (B) Flex/ABD   1 x 5 ea Flex  1 x 10 Flex  1 x 10 Flex/abd  1 x 10 Flex/ABD  1 x 10  Flex/ABD  1 x 10  Flex/ABD   1 x 5 ea 1 x 5      Mod. Child's pose -- -- -- -  -- -- 3 x 5"      AROM  - flexion   - abduction   - scaption    2 x 10 x 1#  2 x 10  2 x 10 x 1#   1 x 20  1 x 20  1 x 15   1 x 20  1 x 10  1 x 10   1 x 10  1 x 10  1 x 10   1 x 10  1 x 10   1 x 10     1 x 5  1 x 5  1 x 5       ER B 2 x 10 RTB B 1 x 15 YTB B 1 x 10 YTB B 1 x 10 YTB         D1 flexion 1 x 15 B 1 x 15 1 x 10 1 x 10                       Initials ENRRIQUE HINES MA     Right shoulder Functional ER: " reaches to C4  Left shoulder functional ER: reaches to C7    Home Exercises Provided and Patient Education Provided     Education provided:   - continued compliance with HEP    Written Home Exercises Provided: yes.  Exercises were reviewed and Christina was able to demonstrate them prior to the end of the session.  Christina demonstrated good  understanding of the education provided.     See EMR under Patient Instructions for exercises provided 6/26/2020.    Assessment     Christina is noted to have improved functional ER and IR. Pt's AROM has improved to allow resistance with certain motions, although required cues to prevent compensation with the upper trap for increased shoulder ROM. Pt tolerated all of today's progressions well without any increase in symptoms prior to leaving clinic. Pt will continue with progression of repetitions during AROM and shoulder strengthening exercises.      Christina is progressing well towards her goals.   Pt prognosis is Good.     Pt will continue to benefit from skilled outpatient physical therapy to address the deficits listed in the problem list box on initial evaluation, provide pt/family education and to maximize pt's level of independence in the home and community environment.     Pt's spiritual, cultural and educational needs considered and pt agreeable to plan of care and goals.     Anticipated barriers to physical therapy: none    Goals:   Short Term Goals:  4 weeks  1. Patient will be compliant with HEP to promote the independent management of current diagnosis. met  2. Patient will increase bilateral shoulder flexion to 110 degrees for function of grooming. met  3. Patient will increase bilateral shoulder functiona ER to reach C7. partially met (right reaches C4)  4. Patient will report a decrease in complaints of bilateral shoulder pain to 5/10 during performance of ADL's for independence of self care activities.  In progress, not met        Long Term Goals:  8 weeks  1. Patient  will increase bilateral shoulder strength to 4/5 to improve tolerance to normal house work.. In progress, not met  2. Patient will increase bilateral shoulder flexion to 125 degrees in order to place dishes in overhead cabinets independently for self care independence.  In progress, not met  3. Patient will increase bilateral shoulder functional IR to reach L3 for function of dressing. In progress, not met  4. Patient will increase scapular stabilization strength to 4/5 to improve tolerance to normal lifting. In progress, not met  5. Patient will improve FOTO limitation status from 47% to 36%  indicating increased functional mobility. In progress, not met  Plan     Continue with the plan of care. Increase reps with wall slides next visit.     Chelly Carter, PTA 1/6

## 2020-07-27 ENCOUNTER — CLINICAL SUPPORT (OUTPATIENT)
Dept: REHABILITATION | Facility: HOSPITAL | Age: 65
End: 2020-07-27
Payer: MEDICARE

## 2020-07-27 DIAGNOSIS — M62.81 MUSCLE WEAKNESS OF RIGHT ARM: ICD-10-CM

## 2020-07-27 DIAGNOSIS — M25.612 DECREASED ROM OF LEFT SHOULDER: ICD-10-CM

## 2020-07-27 DIAGNOSIS — G89.29 CHRONIC PAIN OF BOTH SHOULDERS: Primary | ICD-10-CM

## 2020-07-27 DIAGNOSIS — M25.512 CHRONIC PAIN OF BOTH SHOULDERS: Primary | ICD-10-CM

## 2020-07-27 DIAGNOSIS — M25.511 CHRONIC PAIN OF BOTH SHOULDERS: Primary | ICD-10-CM

## 2020-07-27 DIAGNOSIS — M25.611 DECREASED ROM OF RIGHT SHOULDER: ICD-10-CM

## 2020-07-27 DIAGNOSIS — M62.81 MUSCLE WEAKNESS OF LEFT ARM: ICD-10-CM

## 2020-07-27 PROCEDURE — 97110 THERAPEUTIC EXERCISES: CPT | Mod: PO,CQ

## 2020-07-29 ENCOUNTER — CLINICAL SUPPORT (OUTPATIENT)
Dept: REHABILITATION | Facility: HOSPITAL | Age: 65
End: 2020-07-29
Payer: MEDICARE

## 2020-07-29 DIAGNOSIS — M25.611 DECREASED ROM OF RIGHT SHOULDER: ICD-10-CM

## 2020-07-29 DIAGNOSIS — M25.612 DECREASED ROM OF LEFT SHOULDER: ICD-10-CM

## 2020-07-29 DIAGNOSIS — G89.29 CHRONIC PAIN OF BOTH SHOULDERS: Primary | ICD-10-CM

## 2020-07-29 DIAGNOSIS — M25.511 CHRONIC PAIN OF BOTH SHOULDERS: Primary | ICD-10-CM

## 2020-07-29 DIAGNOSIS — M25.512 CHRONIC PAIN OF BOTH SHOULDERS: Primary | ICD-10-CM

## 2020-07-29 DIAGNOSIS — M62.81 MUSCLE WEAKNESS OF RIGHT ARM: ICD-10-CM

## 2020-07-29 DIAGNOSIS — M62.81 MUSCLE WEAKNESS OF LEFT ARM: ICD-10-CM

## 2020-07-29 PROCEDURE — 97110 THERAPEUTIC EXERCISES: CPT | Mod: PO,CQ

## 2020-07-29 NOTE — PROGRESS NOTES
Physical Therapy Treatment Note     Name: Christina Tomlinson  Clinic Number: [id number]    Therapy Diagnosis:   Encounter Diagnoses   Name Primary?    Chronic pain of both shoulders Yes    Decreased ROM of right shoulder     Decreased ROM of left shoulder     Muscle weakness of left arm     Muscle weakness of right arm      Physician: Everette Aguirre MD    Visit Date: 7/29/2020    Physician Orders: PT Eval and Treat   Medical Diagnosis from Referral:   M75.101 (ICD-10-CM) - Unspecified rotator cuff tear or rupture of right shoulder, not specified as traumatic   M12.811 (ICD-10-CM) - Other specific arthropathies, not elsewhere classified, right shoulder   M12.812 (ICD-10-CM) - Other specific arthropathies, not elsewhere classified, left shoulder   M75.102 (ICD-10-CM) - Unspecified rotator cuff tear or rupture of left shoulder, not specified as traumatic   Evaluation Date: 6/24/2020  Authorization Period Expiration: 08/19/2020  Plan of Care Expiration: 8/24/2020  Visit # / Visits authorized: 11 / 12    Time In: 10:30 am  Time Out: 11:15 am   Total Billable Time: 45 minutes    Precautions: Standard    Subjective     Pt reports: She reports feeling good today with minimal arthritic pain and improved movement of B shoulders. Pt reports doing wall slides at home and sees improvement and getting easier.   She was compliant with home exercise program.  Response to previous treatment: slight increase in soreness  Functional change: improved ROM in shoulders    Pain: 3/10  Location: bilateral shoulder  L>R    Objective     Christina received therapeutic exercises to develop strength, ROM and flexibility for 45 minutes 1:1 with PTA including:    Date  7/29/2020 7/27/2020 7/22/2020 7/20/2020 07/15/2020 07/13/2020 07/08/2020 7/6/2020 6/29/2020 6/26/2020 6/24/2020   VISIT 11/12 10/12 9/12 8/12 7/12 6/12 5/12 4/12 3/12 2/12 1/12                   POC EXP. DATE 8/24/2020 8/24/2020 8/24/2020 8/24/2020 8/24/2020 8/24/2020  "8/24/2020 8/24/2020 8/24/2020 8/24/2020 8/24/2020   VISIT AMOUNT  MEDICARE TOTAL 90.96  962.36 90.96  871.40 90.96  780.44 90.96  689.48 60.64  598.52 90.96  537.68 90.96  446.72 90.96  355.76 90.96  264.80 60.64  173.84 113.20   FACE-TO-FACE 8/22/2020 8/22/2020 8/22/2020 7/24/2020 7/24/2020 7/24/2020 7/24/2020 7/24/2020 7/24/2020 7/24/2020 7/24/2020   FOTO -- --  -- -- -- 5/5 4/5 3/5 2/5 1/5                   Pulley for flexion Flex 2'  ABD 2' 2' 2' 2' 2' 2' 2' 2' 2' 2' next   TABLE:               Scapular retraction 25 x 3" 20 x 3" 20 x 3" 20 x 3" 20 x 3" 20 x 3" 15 x 3" 15 x 3" 15 x 3"  15 x 3" 10 x 3"   Wand flexion -- -- -- -- -- -- -- -- -- 1 x 10 1 x 10   Post shoulder roll 2 x 10 2 x 10 2 x 10 2 x 10 2 x10 2 x 10  1 x 15  1 x 15 1 x 15 1 x 10     Table slides:  - Flex  - ABD  - Scaption  - ER Wall  1 x 10  1 x 10  1 x 10  -- Wall  1 x 5  1 x 5    --   2 x 10  2 x 10     2 x 10   2 x 10  2 x 10     2 x 10     Not today  Not today    Not today   2 x 10  2 x 10     2 x 10     2 x 10  2 x 10    2 x 10     1 x 15  1 x 15    1 x 15   1 x 15  1 x 15    1 x 15   1 x 10  1 x 10    1 x 10     STANDING:               Pec stretch 10 x 10"  Low 5 x 10"  Arms low 5 x 10"  Arms low 5 x 10"  Arms low 5 x 10"   Arms low 5 x 10"   Arms low  5 x 10"   Arms low  3 x 10" next next next   Functional IR str with strap/towel 10 x 10" B 5 x 10" B 1 x 10 1 x 10 1 x 10 NT NT 3 x 10"      Row 3 x 10 GTB 3 x 10 GTB 3 x 10 GTB 2 x 10 GTB 2 x 10 RTB 2 x 10 RTB 2 x 10 RTB 2 x 10 next next next   I's 2 x 10 GTB   2 x 10 RTB            Carlock's BUE RTB  2 x 10 ea NT NT NT Not today 1 x 10 ea RTB -- -- next next next   Codman's -- NT NT NT Not today NT NT 20 x ea  CW/CCW next next next   Wall walks NT (B) Flex/ABD   1 x 5 ea Flex  1 x 10 Flex  1 x 10 Flex/abd  1 x 10 Flex/ABD  1 x 10  Flex/ABD  1 x 10  Flex/ABD   1 x 5 ea 1 x 5      Mod. Child's pose -- -- -- -- -  -- -- 3 x 5"      AROM  - Flexion   - Abduction   - ABD w/thumb up  - " Scaption    2 x 10 x 1#  2 x 10  1 x 10  2 x 10 x 1#   2 x 10 x 1#  2 x 10    2 x 10 x 1#   1 x 20  1 x 20    1 x 15   1 x 20  1 x 10    1 x 10   1 x 10  1 x 10    1 x 10   1 x 10  1 x 10     1 x 10     1 x 5  1 x 5    1 x 5       ER D/C (Corazon's) B 2 x 10 RTB B 1 x 15 YTB B 1 x 10 YTB B 1 x 10 YTB         D1 flexion 1 x 15 B 1 x 15 B 1 x 15 1 x 10 1 x 10                        Initials SB SB MA MA DG BJ BJ SB DG DG MA     Right shoulder Functional ER: reaches to C4  Left shoulder functional ER: reaches to C7    Home Exercises Provided and Patient Education Provided     Education provided:   - continued compliance with HEP    Written Home Exercises Provided: yes.  Exercises were reviewed and Christina was able to demonstrate them prior to the end of the session.  Christina demonstrated good  understanding of the education provided.     See EMR under Patient Instructions for exercises provided 6/26/2020.    Assessment     Christina returns to therapy on this date with minimal pain and improved ROM. She is noted to have improved functional ER and IR (limited more on R side due to arthritis). Pt continues to make great progress towards all set goals and ROM is greatly improving with resistance. Pt has the most difficulty noted with ABD and required cueing for upper trap compensation. Pt was able to complete all progressions to therex routine without increased symptoms or complaints noted prior to leaving clinic.      Christina is progressing well towards her goals.   Pt prognosis is Good.     Pt will continue to benefit from skilled outpatient physical therapy to address the deficits listed in the problem list box on initial evaluation, provide pt/family education and to maximize pt's level of independence in the home and community environment.     Pt's spiritual, cultural and educational needs considered and pt agreeable to plan of care and goals.     Anticipated barriers to physical therapy: none    Goals:   Short Term Goals:   4 weeks  1. Patient will be compliant with HEP to promote the independent management of current diagnosis. met  2. Patient will increase bilateral shoulder flexion to 110 degrees for function of grooming. met  3. Patient will increase bilateral shoulder functiona ER to reach C7. partially met (right reaches C4)  4. Patient will report a decrease in complaints of bilateral shoulder pain to 5/10 during performance of ADL's for independence of self care activities.  In progress, not met        Long Term Goals:  8 weeks  1. Patient will increase bilateral shoulder strength to 4/5 to improve tolerance to normal house work.. In progress, not met  2. Patient will increase bilateral shoulder flexion to 125 degrees in order to place dishes in overhead cabinets independently for self care independence.  In progress, not met  3. Patient will increase bilateral shoulder functional IR to reach L3 for function of dressing. In progress, not met  4. Patient will increase scapular stabilization strength to 4/5 to improve tolerance to normal lifting. In progress, not met  5. Patient will improve FOTO limitation status from 47% to 36%  indicating increased functional mobility. In progress, not met  Plan     Continue with the plan of care.    Chelly Carter, PTA 2/6

## 2020-08-04 ENCOUNTER — CLINICAL SUPPORT (OUTPATIENT)
Dept: REHABILITATION | Facility: HOSPITAL | Age: 65
End: 2020-08-04
Payer: MEDICARE

## 2020-08-04 DIAGNOSIS — M25.612 DECREASED ROM OF LEFT SHOULDER: ICD-10-CM

## 2020-08-04 DIAGNOSIS — M62.81 MUSCLE WEAKNESS OF LEFT ARM: ICD-10-CM

## 2020-08-04 DIAGNOSIS — M25.512 CHRONIC PAIN OF BOTH SHOULDERS: ICD-10-CM

## 2020-08-04 DIAGNOSIS — G89.29 CHRONIC PAIN OF BOTH SHOULDERS: ICD-10-CM

## 2020-08-04 DIAGNOSIS — M62.81 MUSCLE WEAKNESS OF RIGHT ARM: ICD-10-CM

## 2020-08-04 DIAGNOSIS — M25.511 CHRONIC PAIN OF BOTH SHOULDERS: ICD-10-CM

## 2020-08-04 DIAGNOSIS — M25.611 DECREASED ROM OF RIGHT SHOULDER: ICD-10-CM

## 2020-08-04 PROCEDURE — 97110 THERAPEUTIC EXERCISES: CPT | Mod: PO

## 2020-08-04 NOTE — PROGRESS NOTES
Physical Therapy Treatment Note     Name: Christina Tomlinson  Clinic Number: [id number]    Therapy Diagnosis:   Encounter Diagnoses   Name Primary?    Chronic pain of both shoulders     Decreased ROM of right shoulder     Decreased ROM of left shoulder     Muscle weakness of left arm     Muscle weakness of right arm      Physician: Everette Aguirre MD    Visit Date: 8/4/2020    Physician Orders: PT Eval and Treat   Medical Diagnosis from Referral:   M75.101 (ICD-10-CM) - Unspecified rotator cuff tear or rupture of right shoulder, not specified as traumatic   M12.811 (ICD-10-CM) - Other specific arthropathies, not elsewhere classified, right shoulder   M12.812 (ICD-10-CM) - Other specific arthropathies, not elsewhere classified, left shoulder   M75.102 (ICD-10-CM) - Unspecified rotator cuff tear or rupture of left shoulder, not specified as traumatic   Evaluation Date: 6/24/2020  Authorization Period Expiration: 08/19/2020  Plan of Care Expiration: 8/24/2020  Visit # / Visits authorized: 12 / 12    Time In: 11:46 am  Time Out: 12:30 pm  Total Billable Time: 40 minutes    Precautions: Standard    Subjective     Pt reports: reports doing good today, did her HEP this morning.   She was compliant with home exercise program.  Response to previous treatment: slight increase in soreness  Functional change: improved ROM in shoulders    Pain: 4/10  Location: bilateral shoulder  L>R    Objective     Christina received therapeutic exercises to develop strength, ROM and flexibility for 40 minutes 1:1 with PT including:    Date  8/4/2020 7/29/2020 7/27/2020 7/22/2020 7/20/2020 07/15/2020 07/13/2020 07/08/2020 7/6/2020 6/29/2020 6/26/2020 6/24/2020   VISIT 12/12 11/12 10/12 9/12 8/12 7/12 6/12 5/12 4/12 3/12 2/12 1/12                    POC EXP. DATE 8/24/2020 8/24/2020 8/24/2020 8/24/2020 8/24/2020 8/24/2020 8/24/2020 8/24/2020 8/24/2020 8/24/2020 8/24/2020 8/24/2020   VISIT AMOUNT  MEDICARE TOTAL 90.96  1053.32  "90.96  962.36 90.96  871.40 90.96  780.44 90.96  689.48 60.64  598.52 90.96  537.68 90.96  446.72 90.96  355.76 90.96  264.80 60.64  173.84 113.20   FACE-TO-FACE 8/22/2020 8/22/2020 8/22/2020 8/22/2020 7/24/2020 7/24/2020 7/24/2020 7/24/2020 7/24/2020 7/24/2020 7/24/2020 7/24/2020   FOTO -- -- --  -- -- -- 5/5 4/5 3/5 2/5 1/5                    Pulley for flexion Flex 2'  abd 2' Flex 2'  ABD 2' 2' 2' 2' 2' 2' 2' 2' 2' 2' next   TABLE:                Scapular retraction 25 x 3" 25 x 3" 20 x 3" 20 x 3" 20 x 3" 20 x 3" 20 x 3" 15 x 3" 15 x 3" 15 x 3"  15 x 3" 10 x 3"   Wand flexion -- -- -- -- -- -- -- -- -- -- 1 x 10 1 x 10   Post shoulder roll 2 x 10 2 x 10 2 x 10 2 x 10 2 x 10 2 x10 2 x 10  1 x 15  1 x 15 1 x 15 1 x 10     Table slides:  - Flex  - ABD  - Scaption  - ER Wall  1 x 10  1 x 10  Not today  -- Wall  1 x 10  1 x 10  1 x 10  -- Wall  1 x 5  1 x 5    --   2 x 10  2 x 10     2 x 10   2 x 10  2 x 10     2 x 10     Not today  Not today    Not today   2 x 10  2 x 10     2 x 10     2 x 10  2 x 10    2 x 10     1 x 15  1 x 15    1 x 15   1 x 15  1 x 15    1 x 15   1 x 10  1 x 10    1 x 10     STANDING:                Pec stretch 10 x 10" 10 x 10"  Low 5 x 10"  Arms low 5 x 10"  Arms low 5 x 10"  Arms low 5 x 10"   Arms low 5 x 10"   Arms low  5 x 10"   Arms low  3 x 10" next next next   Functional IR str with strap/towel 10 x 10" B 10 x 10" B 5 x 10" B 1 x 10 1 x 10 1 x 10 NT NT 3 x 10"      Row 3 x 10 GTB 3 x 10 GTB 3 x 10 GTB 3 x 10 GTB 2 x 10 GTB 2 x 10 RTB 2 x 10 RTB 2 x 10 RTB 2 x 10 next next next   I's 3 x 10 GTB 2 x 10 GTB   2 x 10 RTB            Mccleary's B RTB  2 x 10 ea  BUE RTB  2 x 10 ea NT NT NT Not today 1 x 10 ea RTB -- -- next next next   Codman's -- -- NT NT NT Not today NT NT 20 x ea  CW/CCW next next next   Wall walks -- NT (B) Flex/ABD   1 x 5 ea Flex  1 x 10 Flex  1 x 10 Flex/abd  1 x 10 Flex/ABD  1 x 10  Flex/ABD  1 x 10  Flex/ABD   1 x 5 ea 1 x 5      Mod. Child's pose -- -- -- -- -- -  -- " "-- 3 x 5"      AROM  - Flexion   - Abduction   - ABD w/thumb up  - Scaption    2 x 10 x 1#  Not today  Not today  Not today   2 x 10 x 1#  2 x 10  1 x 10  2 x 10 x 1#   2 x 10 x 1#  2 x 10    2 x 10 x 1#   1 x 20  1 x 20    1 x 15   1 x 20  1 x 10    1 x 10   1 x 10  1 x 10    1 x 10   1 x 10  1 x 10     1 x 10     1 x 5  1 x 5    1 x 5       ER -- D/C (Corazon's) B 2 x 10 RTB B 1 x 15 YTB B 1 x 10 YTB B 1 x 10 YTB         D1 flexion Not today 1 x 15 B 1 x 15 B 1 x 15 1 x 10 1 x 10                         Initials DG SB SB CHERYLE HINES MA       Home Exercises Provided and Patient Education Provided     Education provided:   - continued compliance with HEP    Written Home Exercises Provided: yes.  Exercises were reviewed and Christina was able to demonstrate them prior to the end of the session.  Christina demonstrated good  understanding of the education provided.     See EMR under Patient Instructions for exercises provided 6/26/2020.    Assessment     Christina was able to perform all of today's progressions with no increase in symptoms prior to leaving the clinic. She continues to require occasional verbal and tactile cues to avoid overuse of her upper trap with theraband exercises.  When performing ER with theraband she required frequent verbal and tactile cues to avoid substitutions especially with L UE.      Christina is progressing well towards her goals.   Pt prognosis is Good.     Pt will continue to benefit from skilled outpatient physical therapy to address the deficits listed in the problem list box on initial evaluation, provide pt/family education and to maximize pt's level of independence in the home and community environment.     Pt's spiritual, cultural and educational needs considered and pt agreeable to plan of care and goals.     Anticipated barriers to physical therapy: none    Goals:   Short Term Goals:  4 weeks  1. Patient will be compliant with HEP to promote the independent management of " current diagnosis. met  2. Patient will increase bilateral shoulder flexion to 110 degrees for function of grooming. met  3. Patient will increase bilateral shoulder functiona ER to reach C7. partially met (right reaches C4)  4. Patient will report a decrease in complaints of bilateral shoulder pain to 5/10 during performance of ADL's for independence of self care activities.  In progress, not met        Long Term Goals:  8 weeks  1. Patient will increase bilateral shoulder strength to 4/5 to improve tolerance to normal house work.. In progress, not met  2. Patient will increase bilateral shoulder flexion to 125 degrees in order to place dishes in overhead cabinets independently for self care independence.  In progress, not met  3. Patient will increase bilateral shoulder functional IR to reach L3 for function of dressing. In progress, not met  4. Patient will increase scapular stabilization strength to 4/5 to improve tolerance to normal lifting. In progress, not met  5. Patient will improve FOTO limitation status from 47% to 36%  indicating increased functional mobility. In progress, not met  Plan     Continue with the plan of care. Resume all of her usual exercises next visit.     Cora Degroot, PT

## 2020-08-06 ENCOUNTER — CLINICAL SUPPORT (OUTPATIENT)
Dept: REHABILITATION | Facility: HOSPITAL | Age: 65
End: 2020-08-06
Payer: MEDICARE

## 2020-08-06 DIAGNOSIS — M62.81 MUSCLE WEAKNESS OF LEFT ARM: ICD-10-CM

## 2020-08-06 DIAGNOSIS — M25.512 CHRONIC PAIN OF BOTH SHOULDERS: ICD-10-CM

## 2020-08-06 DIAGNOSIS — M25.612 DECREASED ROM OF LEFT SHOULDER: ICD-10-CM

## 2020-08-06 DIAGNOSIS — M25.511 CHRONIC PAIN OF BOTH SHOULDERS: ICD-10-CM

## 2020-08-06 DIAGNOSIS — M62.81 MUSCLE WEAKNESS OF RIGHT ARM: ICD-10-CM

## 2020-08-06 DIAGNOSIS — M25.611 DECREASED ROM OF RIGHT SHOULDER: ICD-10-CM

## 2020-08-06 DIAGNOSIS — G89.29 CHRONIC PAIN OF BOTH SHOULDERS: ICD-10-CM

## 2020-08-06 PROCEDURE — 97110 THERAPEUTIC EXERCISES: CPT | Mod: PO

## 2020-08-06 NOTE — PROGRESS NOTES
Physical Therapy Treatment Note     Name: Christina Tomlinson  Clinic Number: [id number]    Therapy Diagnosis:   Encounter Diagnoses   Name Primary?    Chronic pain of both shoulders     Decreased ROM of right shoulder     Decreased ROM of left shoulder     Muscle weakness of left arm     Muscle weakness of right arm      Physician: Everette Aguirre MD    Visit Date: 8/6/2020    Physician Orders: PT Eval and Treat   Medical Diagnosis from Referral:   M75.101 (ICD-10-CM) - Unspecified rotator cuff tear or rupture of right shoulder, not specified as traumatic   M12.811 (ICD-10-CM) - Other specific arthropathies, not elsewhere classified, right shoulder   M12.812 (ICD-10-CM) - Other specific arthropathies, not elsewhere classified, left shoulder   M75.102 (ICD-10-CM) - Unspecified rotator cuff tear or rupture of left shoulder, not specified as traumatic   Evaluation Date: 6/24/2020  Authorization Period Expiration: 08/19/2020  Plan of Care Expiration: 8/24/2020  Visit # / Visits authorized: 13 / pending    Time In: 12:00 pm  Time Out: 12:40 pm  Total Billable Time: 40 minutes    Precautions: Standard    Subjective     Pt reports: having arthritis flare up in knee, wrist, and hands.  She reports shoulders are doing well.  She was compliant with home exercise program.  Response to previous treatment: slight increase in soreness  Functional change: improved ROM in shoulders    Pain: 4/10  Location: bilateral shoulder  L>R    Objective     Christina received therapeutic exercises to develop strength, ROM and flexibility for 40 minutes 1:1 with PT including:    Date  8/6/2020 8/4/2020 7/29/2020 7/27/2020 7/22/2020 7/20/2020 07/15/2020 07/13/2020 07/08/2020 7/6/2020 6/29/2020 6/26/2020 6/24/2020   VISIT 13/pending 12/12 11/12 10/12 9/12 8/12 7/12 6/12 5/12 4/12 3/12 2/12 1/12                     POC EXP. DATE 8/24/2020 8/24/2020 8/24/2020 8/24/2020 8/24/2020 8/24/2020 8/24/2020 8/24/2020 8/24/2020 8/24/2020 8/24/2020  "8/24/2020 8/24/2020   VISIT AMOUNT  MEDICARE TOTAL 90.96  1144.28 90.96  1053.32 90.96  962.36 90.96  871.40 90.96  780.44 90.96  689.48 60.64  598.52 90.96  537.68 90.96  446.72 90.96  355.76 90.96  264.80 60.64  173.84 113.20   FACE-TO-FACE 8/22/2020 8/22/2020 8/22/2020 8/22/2020 8/22/2020 7/24/2020 7/24/2020 7/24/2020 7/24/2020 7/24/2020 7/24/2020 7/24/2020 7/24/2020   FOTO -- -- -- --  -- -- -- 5/5 4/5 3/5 2/5 1/5                     Pulley for flexion Flex 2'  abd 2' Flex 2'  abd 2' Flex 2'  ABD 2' 2' 2' 2' 2' 2' 2' 2' 2' 2' next   TABLE:                 Scapular retraction 30 x 3" 25 x 3" 25 x 3" 20 x 3" 20 x 3" 20 x 3" 20 x 3" 20 x 3" 15 x 3" 15 x 3" 15 x 3"  15 x 3" 10 x 3"   Wand flexion -- -- -- -- -- -- -- -- -- -- -- 1 x 10 1 x 10   Post shoulder roll -- 2 x 10 2 x 10 2 x 10 2 x 10 2 x 10 2 x10 2 x 10  1 x 15  1 x 15 1 x 15 1 x 10     Table slides:  - Flex  - ABD  - Scaption  - ER Wall  1 x 10  1 x 10  Not today Wall  1 x 10  1 x 10  Not today  -- Wall  1 x 10  1 x 10  1 x 10  -- Wall  1 x 5  1 x 5    --   2 x 10  2 x 10     2 x 10   2 x 10  2 x 10     2 x 10     Not today  Not today    Not today   2 x 10  2 x 10     2 x 10     2 x 10  2 x 10    2 x 10     1 x 15  1 x 15    1 x 15   1 x 15  1 x 15    1 x 15   1 x 10  1 x 10    1 x 10     STANDING:                 Pec stretch 10 x 10"   Low 10 x 10" 10 x 10"  Low 5 x 10"  Arms low 5 x 10"  Arms low 5 x 10"  Arms low 5 x 10"   Arms low 5 x 10"   Arms low  5 x 10"   Arms low  3 x 10" next next next   Functional IR str with strap/towel 10 x 10" B 10 x 10" B 10 x 10" B 5 x 10" B 1 x 10 1 x 10 1 x 10 NT NT 3 x 10"      Row 3 x 10 BTB 3 x 10 GTB 3 x 10 GTB 3 x 10 GTB 3 x 10 GTB 2 x 10 GTB 2 x 10 RTB 2 x 10 RTB 2 x 10 RTB 2 x 10 next next next   I's 3 x 10 BTB 3 x 10 GTB 2 x 10 GTB   2 x 10 RTB            Corazon's B RTB  2 x 10 ea B RTB  2 x 10 ea  BUE RTB  2 x 10 ea NT NT NT Not today 1 x 10 ea RTB -- -- next next next   Wall walks NT -- NT (B) Flex/ABD   1 x " 5 ea Flex  1 x 10 Flex  1 x 10 Flex/abd  1 x 10 Flex/ABD  1 x 10  Flex/ABD  1 x 10  Flex/ABD   1 x 5 ea 1 x 5      AROM  - Flexion   - Abduction   - ABD w/thumb up  - Scaption    2 x 10 x 1#  2 x 10  Not today  2 x 10 x 1#   2 x 10 x 1#  Not today  Not today  Not today   2 x 10 x 1#  2 x 10  1 x 10  2 x 10 x 1#   2 x 10 x 1#  2 x 10    2 x 10 x 1#   1 x 20  1 x 20    1 x 15   1 x 20  1 x 10    1 x 10   1 x 10  1 x 10    1 x 10   1 x 10  1 x 10     1 x 10     1 x 5  1 x 5    1 x 5       D1 flexion Not today Not today 1 x 15 B 1 x 15 B 1 x 15 1 x 10 1 x 10                          Initials MA DG SB SB MA MA DG BJ BJ SB DG DG MA       Home Exercises Provided and Patient Education Provided     Education provided:   - continued compliance with HEP    Written Home Exercises Provided: yes.  Exercises were reviewed and Christina was able to demonstrate them prior to the end of the session.  Christina demonstrated good  understanding of the education provided.     See EMR under Patient Instructions for exercises provided 6/26/2020.    Assessment     Christina is able to progress with resistance during scapular stabilization exercises without increase in pain during or following treatment.  She continues to have difficulty performing active abduction exercise, and continues to require verbal cues for proper form during Corazon exercises.  She has improved strength and AROM of shoulders and reporting improved tolerance to ADL's.        Christina is progressing well towards her goals.   Pt prognosis is Good.     Pt will continue to benefit from skilled outpatient physical therapy to address the deficits listed in the problem list box on initial evaluation, provide pt/family education and to maximize pt's level of independence in the home and community environment.     Pt's spiritual, cultural and educational needs considered and pt agreeable to plan of care and goals.     Anticipated barriers to physical therapy: none    Goals:   Short  Term Goals:  4 weeks  1. Patient will be compliant with HEP to promote the independent management of current diagnosis. met  2. Patient will increase bilateral shoulder flexion to 110 degrees for function of grooming. met  3. Patient will increase bilateral shoulder functiona ER to reach C7. partially met (right reaches C4)  4. Patient will report a decrease in complaints of bilateral shoulder pain to 5/10 during performance of ADL's for independence of self care activities.  In progress, not met        Long Term Goals:  8 weeks  1. Patient will increase bilateral shoulder strength to 4/5 to improve tolerance to normal house work.. In progress, not met  2. Patient will increase bilateral shoulder flexion to 125 degrees in order to place dishes in overhead cabinets independently for self care independence.  In progress, not met  3. Patient will increase bilateral shoulder functional IR to reach L3 for function of dressing. In progress, not met  4. Patient will increase scapular stabilization strength to 4/5 to improve tolerance to normal lifting. In progress, not met  5. Patient will improve FOTO limitation status from 47% to 36%  indicating increased functional mobility. In progress, not met  Plan     Continue with the plan of care.     Everette Redd, PT

## 2020-08-10 ENCOUNTER — OFFICE VISIT (OUTPATIENT)
Dept: CARDIOLOGY | Facility: CLINIC | Age: 65
End: 2020-08-10
Payer: MEDICARE

## 2020-08-10 VITALS
HEART RATE: 86 BPM | SYSTOLIC BLOOD PRESSURE: 142 MMHG | BODY MASS INDEX: 36.91 KG/M2 | HEIGHT: 64 IN | DIASTOLIC BLOOD PRESSURE: 68 MMHG | WEIGHT: 216.19 LBS | OXYGEN SATURATION: 100 %

## 2020-08-10 DIAGNOSIS — I25.119 CORONARY ARTERY DISEASE INVOLVING NATIVE CORONARY ARTERY OF NATIVE HEART WITH ANGINA PECTORIS: ICD-10-CM

## 2020-08-10 DIAGNOSIS — I11.0 CARDIOMYOPATHY DUE TO HYPERTENSION, WITH HEART FAILURE: ICD-10-CM

## 2020-08-10 DIAGNOSIS — I43 CARDIOMYOPATHY DUE TO HYPERTENSION, WITH HEART FAILURE: ICD-10-CM

## 2020-08-10 DIAGNOSIS — I42.9 CARDIOMYOPATHY, UNSPECIFIED TYPE: Primary | ICD-10-CM

## 2020-08-10 DIAGNOSIS — I10 ESSENTIAL HYPERTENSION: ICD-10-CM

## 2020-08-10 DIAGNOSIS — I34.0 MODERATE MITRAL INSUFFICIENCY: ICD-10-CM

## 2020-08-10 PROCEDURE — 3008F BODY MASS INDEX DOCD: CPT | Mod: CPTII,S$GLB,, | Performed by: INTERNAL MEDICINE

## 2020-08-10 PROCEDURE — 99214 OFFICE O/P EST MOD 30 MIN: CPT | Mod: S$GLB,,, | Performed by: INTERNAL MEDICINE

## 2020-08-10 PROCEDURE — 3077F PR MOST RECENT SYSTOLIC BLOOD PRESSURE >= 140 MM HG: ICD-10-PCS | Mod: CPTII,S$GLB,, | Performed by: INTERNAL MEDICINE

## 2020-08-10 PROCEDURE — 3077F SYST BP >= 140 MM HG: CPT | Mod: CPTII,S$GLB,, | Performed by: INTERNAL MEDICINE

## 2020-08-10 PROCEDURE — 99214 PR OFFICE/OUTPT VISIT, EST, LEVL IV, 30-39 MIN: ICD-10-PCS | Mod: S$GLB,,, | Performed by: INTERNAL MEDICINE

## 2020-08-10 PROCEDURE — 1101F PT FALLS ASSESS-DOCD LE1/YR: CPT | Mod: CPTII,S$GLB,, | Performed by: INTERNAL MEDICINE

## 2020-08-10 PROCEDURE — 3078F DIAST BP <80 MM HG: CPT | Mod: CPTII,S$GLB,, | Performed by: INTERNAL MEDICINE

## 2020-08-10 PROCEDURE — 3078F PR MOST RECENT DIASTOLIC BLOOD PRESSURE < 80 MM HG: ICD-10-PCS | Mod: CPTII,S$GLB,, | Performed by: INTERNAL MEDICINE

## 2020-08-10 PROCEDURE — 1101F PR PT FALLS ASSESS DOC 0-1 FALLS W/OUT INJ PAST YR: ICD-10-PCS | Mod: CPTII,S$GLB,, | Performed by: INTERNAL MEDICINE

## 2020-08-10 PROCEDURE — 3008F PR BODY MASS INDEX (BMI) DOCUMENTED: ICD-10-PCS | Mod: CPTII,S$GLB,, | Performed by: INTERNAL MEDICINE

## 2020-08-10 RX ORDER — ASPIRIN 81 MG/1
81 TABLET ORAL ONCE
Qty: 90 TABLET | Refills: 3
Start: 2020-08-10 | End: 2023-09-11

## 2020-08-10 RX ORDER — METOPROLOL SUCCINATE 50 MG/1
50 TABLET, EXTENDED RELEASE ORAL DAILY
Qty: 90 TABLET | Refills: 3 | Status: SHIPPED | OUTPATIENT
Start: 2020-08-10 | End: 2021-02-10

## 2020-08-10 RX ORDER — ATORVASTATIN CALCIUM 40 MG/1
40 TABLET, FILM COATED ORAL DAILY
Qty: 90 TABLET | Refills: 3 | Status: SHIPPED | OUTPATIENT
Start: 2020-08-10 | End: 2021-08-17

## 2020-08-10 RX ORDER — VALSARTAN 160 MG/1
160 TABLET ORAL DAILY
Qty: 90 TABLET | Refills: 3 | Status: SHIPPED | OUTPATIENT
Start: 2020-08-10 | End: 2021-10-04

## 2020-08-10 RX ORDER — FUROSEMIDE 40 MG/1
40 TABLET ORAL DAILY
Qty: 90 TABLET | Refills: 3 | Status: SHIPPED | OUTPATIENT
Start: 2020-08-10 | End: 2021-08-17

## 2020-08-10 NOTE — PROGRESS NOTES
Subjective:   Patient ID:  Christina Tomlinson is a 64 y.o. female who presents for evaluation of cardiomyopathy    HPI:   Pt was previously seen by Dr. Solorio/Dr. Mckeon .  Since last visit she has been doing well/ No chest pain, no palpitations  She is active and rides her stationary bikes for 30 mins 3 times a week.         Hx of cardiomyopathy with LVEF ~ 45  9/18/19  Ef improved to 45% so not needing AICD.       CATH 6/2016  Angiographic Results     Diagnostic:          Patient has a left dominant coronary artery.        - Left Main Coronary Artery:             The ostial LM is normal. There is ARIELA 3 flow.     - Left Anterior Descending Artery:             The LAD is normal. There is ARIELA 3 flow.     - D1:             The D1 has luminal irregularities. There is ARIELA 3 flow.     - Ramus:             The proximal ramus has a 60% stenosis. There is ARIELA 3 flow.     - Left Circumflex Artery:             The LCX is normal. There is ARIELA 3 flow.     - Right Coronary Artery:             The proximal RCA has a 75% stenosis. There is ARIELA 3 flow. The remaining portion of the vessel is of small caliber.  TTE 1/2017      Mildly depressed left ventricular systolic function (EF 45-50%).     2 - Normal left ventricular diastolic function.     3 - Normal right ventricular systolic function .     4 - Moderate mitral regurgitation.     DEVAN 8/2016    Moderately depressed left ventricular systolic function (EF 35-40%).     2 - Mild to moderate mitral regurgitation.     3 - Left atrial appendage is single lobed.     4 - Left atrium is enlarged.     5 - Grade 1 atheroma disease of aorta.    Patient Active Problem List    Diagnosis Date Noted    Chronic pain of both shoulders 06/24/2020    Decreased ROM of right shoulder 06/24/2020    Decreased ROM of left shoulder 06/24/2020    Muscle weakness of left arm 06/24/2020    Muscle weakness of right arm 06/24/2020    Urolithiasis 10/05/2018    Moderate mitral insufficiency  01/10/2017    Cardiomyopathy 08/09/2016    Coronary artery disease involving native coronary artery of native heart with angina pectoris 08/09/2016    Cardiomyopathy due to hypertension, with heart failure 06/22/2016     L dominant system, LAD, D1, LCx patent, Ramus 60%, small RCA with 75% stenosis, EF 40% with LVEDP 28 mmHg  MR not well visualized, at least moderate      Decreased ROM of right knee 03/15/2016    Weakness of right lower extremity 03/15/2016    Difficulty walking 03/15/2016    Decreased functional mobility 03/15/2016    S/P total knee arthroplasty 03/14/2016    Knee pain 03/09/2016    Essential hypertension 03/02/2016    Anemia of chronic disease 03/02/2016    Screening for colorectal cancer 02/01/2016    Anemia 01/13/2016    Primary hyperparathyroidism 12/02/2015    Acute rheumatoid arthritis 10/08/2015    Primary osteoarthritis of right knee 09/16/2015    Back pain of thoracolumbar region 06/29/2015    GERD (gastroesophageal reflux disease) 06/29/2015    Arthritis of knee 04/07/2015    Arthritis of hand 04/07/2015    Medial meniscus tear 08/27/2014    Dysphagia, oropharyngeal 09/06/2013    Dysphagia, unspecified(787.20) 09/06/2013     Dx updated per 2019 IMO Load      Breast cancer screening 07/30/2013    Sore throat 01/07/2013    Torn rotator cuff 01/07/2013    Corns and callosities 11/30/2012    Dermatophytosis of nail 11/30/2012    Pain in limb 11/30/2012       Patient's Medications   New Prescriptions    No medications on file   Previous Medications    ACETAMINOPHEN (TYLENOL) 500 MG TABLET    Take 500 mg by mouth every 6 (six) hours as needed for Pain.    AMITRIPTYLINE (ELAVIL) 25 MG TABLET    Take 25 mg by mouth nightly as needed for Insomnia.    BRIMONIDINE 0.15 % OPTH DROP (ALPHAGAN) 0.15 % OPHTHALMIC SOLUTION    1 drop 3 (three) times daily.    CHOLECALCIFEROL, VITAMIN D3, 3,000 UNIT TAB    Take by mouth.    FERROUS GLUCONATE (FERGON) 240 (27 FE) MG TABLET     Take 480 mg by mouth 3 (three) times daily.    FOLIC ACID (FOLVITE) 800 MCG TAB    Take 800 mcg by mouth once daily.    HYDROXYCHLOROQUINE (PLAQUENIL) 200 MG TABLET    Take 200 mg by mouth 2 (two) times daily.    Modified Medications    Modified Medication Previous Medication    ASPIRIN (ECOTRIN) 81 MG EC TABLET aspirin (ECOTRIN) 81 MG EC tablet       Take 1 tablet (81 mg total) by mouth once. for 1 dose    Take 1 tablet (81 mg total) by mouth once. for 1 dose    ATORVASTATIN (LIPITOR) 40 MG TABLET atorvastatin (LIPITOR) 40 MG tablet       Take 1 tablet (40 mg total) by mouth once daily.    Take 1 tablet (40 mg total) by mouth once daily.    FUROSEMIDE (LASIX) 40 MG TABLET furosemide (LASIX) 40 MG tablet       Take 1 tablet (40 mg total) by mouth once daily.    Take 1 tablet (40 mg total) by mouth once daily.    METOPROLOL SUCCINATE (TOPROL-XL) 50 MG 24 HR TABLET metoprolol succinate (TOPROL-XL) 50 MG 24 hr tablet       Take 1 tablet (50 mg total) by mouth once daily.    Take 1 tablet (50 mg total) by mouth once daily.    VALSARTAN (DIOVAN) 160 MG TABLET valsartan (DIOVAN) 160 MG tablet       Take 1 tablet (160 mg total) by mouth once daily.    Take 1 tablet (160 mg total) by mouth once daily.   Discontinued Medications    PANTOPRAZOLE (PROTONIX) 40 MG TABLET    TAKE 1 TABLET BY MOUTH ONCE DAILY         Review of Systems   Constitution: Negative for chills and fever.   HENT: Negative for hearing loss and nosebleeds.    Eyes: Negative for blurred vision.   Cardiovascular: Negative for chest pain, leg swelling and palpitations.   Respiratory: Negative for hemoptysis and shortness of breath.    Hematologic/Lymphatic: Negative for bleeding problem.   Skin: Negative for itching.   Musculoskeletal: Negative for falls.   Gastrointestinal: Negative for abdominal pain and hematochezia.   Genitourinary: Negative for hematuria.   Neurological: Negative for dizziness and loss of balance.   Psychiatric/Behavioral: Negative for  altered mental status and depression.         Objective:   Physical Exam   Constitutional: She is oriented to person, place, and time. She appears well-developed and well-nourished.   HENT:   Head: Normocephalic and atraumatic.   Eyes: Conjunctivae are normal.   Neck: Neck supple. No JVD present. Carotid bruit is not present.   Cardiovascular: Normal rate, regular rhythm and normal heart sounds. Exam reveals no gallop and no friction rub.   No murmur heard.  Pulses:       Carotid pulses are 2+ on the right side and 2+ on the left side.       Radial pulses are 2+ on the right side and 2+ on the left side.   Pulmonary/Chest: Effort normal and breath sounds normal. No stridor. No respiratory distress. She has no wheezes.   Neurological: She is alert and oriented to person, place, and time.   Skin: Skin is warm and dry.   Psychiatric: She has a normal mood and affect. Her behavior is normal.       Lab Results    Lab Results   Component Value Date     10/08/2019    K 3.7 10/08/2019     10/08/2019    CO2 30 (H) 10/08/2019    BUN 15 10/08/2019    CREATININE 1.23 10/08/2019    GLU 99 10/08/2019    HGBA1C 5.2 02/15/2016    MG 2.1 04/04/2018    AST 33 09/30/2018    ALT 25 09/30/2018    ALBUMIN 4.0 09/30/2018    PROT 7.5 09/30/2018    BILITOT 0.4 09/30/2018    WBC 10.65 09/30/2018    HGB 11.1 (L) 09/30/2018    HCT 34.3 (L) 09/30/2018    MCV 93 09/30/2018     09/30/2018    INR 1.0 08/17/2016    TSH 0.669 04/07/2015    CHOL 157 06/22/2016    HDL 39 (L) 06/22/2016    LDLCALC 97.2 06/22/2016    TRIG 104 06/22/2016    CRP 27.6 (H) 09/14/2016       Lipid panel  Lab Results   Component Value Date    CHOL 157 06/22/2016     Lab Results   Component Value Date    HDL 39 (L) 06/22/2016     Lab Results   Component Value Date    LDLCALC 97.2 06/22/2016     Lab Results   Component Value Date    TRIG 104 06/22/2016       Cardiac Studies  Significant Imaging: Echocardiogram:   2D echo with color flow doppler:   Results for  orders placed or performed during the hospital encounter of 01/16/17   2D Echo w/ Color Flow Doppler   Result Value Ref Range    QEF 45 55 - 65    Mitral Valve Regurgitation MODERATE (A)     Diastolic Dysfunction No     Est. PA Systolic Pressure 18.68     Mitral Valve Mobility NORMAL     Tricuspid Valve Regurgitation TRIVIAL TO MILD     Narrative    Date of Procedure: 01/16/2017        TEST DESCRIPTION   Technical Quality: This is a technically adequate study.     Aorta: The aortic root is normal in size, measuring 2.1 cm at sinotubular junction.     Left Atrium: The left atrial volume index is normal, measuring 24.06 cc/m2.     Left Ventricle: The left ventricle is normal in size, with an end-diastolic diameter of 5.2 cm, and an end-systolic diameter of 4.1 cm. LV wall thickness is normal, with the septum measuring 0.6 cm and the posterior wall measuring 0.5 cm across. Relative   wall thickness was normal at 0.19, and the LV mass index was 53.6 g/m2 consistent with normal left ventricular mass. Global left ventricular systolic function appears mildly depressed. Visually estimated ejection fraction is 45-50%. The LV Doppler   derived stroke volume equals 33.0 ccs.   The E/e'(lat) is 10, consistent with normal diastolic function.     Right Atrium: The right atrium is normal in size, measuring 3.3 cm in length in the apical view.     Right Ventricle: The right ventricle is normal in size measuring 2.3 cm at the base in the apical right ventricle-focused view. Global right ventricular systolic function appears normal. Tricuspid annular plane systolic excursion (TAPSE) is 2.0 cm. The   estimated PA systolic pressure is 19 mmHg.     Aortic Valve:  The aortic valve is normal in structure with normal leaflet mobility.     Mitral Valve:  The mitral valve is normal in structure with normal leaflet mobility. There is moderate mitral regurgitation.     Tricuspid Valve:  The tricuspid valve is normal in structure with normal  leaflet mobility. There is trivial to mild tricuspid regurgitation.     Pulmonary Valve:  The pulmonic valve is not well seen.     IVC: IVC is normal in size and collapses > 50% with a sniff, suggesting normal right atrial pressure of 3 mmHg.     Atrial Septum: The atrial septum is intact.     Intracavitary: There is no evidence of pericardial effusion, intracavity mass, thrombi, or vegetation.         CONCLUSIONS     1 - Mildly depressed left ventricular systolic function (EF 45-50%).     2 - Normal left ventricular diastolic function.     3 - Normal right ventricular systolic function .     4 - Moderate mitral regurgitation.             This document has been electronically    SIGNED BY: Chan Solorio MD On: 01/16/2017 11:55    and Transthoracic echo (TTE) complete (Cupid Only): No results found for this or any previous visit.  ECG:  normal EKG, normal sinus rhythm, unchanged from previous tracings.    Cath study :  2016    Angiographic Results     Diagnostic:          Patient has a left dominant coronary artery.        - Left Main Coronary Artery:             The ostial LM is normal. There is ARIELA 3 flow.     - Left Anterior Descending Artery:             The LAD is normal. There is ARIELA 3 flow.     - D1:             The D1 has luminal irregularities. There is ARIELA 3 flow.     - Ramus:             The proximal ramus has a 60% stenosis. There is ARIELA 3 flow.     - Left Circumflex Artery:             The LCX is normal. There is ARIELA 3 flow.     - Right Coronary Artery:             The proximal RCA has a 75% stenosis. There is ARIELA 3 flow. The remaining portion of the vessel is of small caliber.       Assessment:     1. Cardiomyopathy, unspecified type    2. Essential hypertension    3. Cardiomyopathy due to hypertension, with heart failure    4. Coronary artery disease involving native coronary artery of native heart with angina pectoris    5. Moderate mitral insufficiency        Plan:   - Milldy depressed  AZYL28-24%  - NYHA I, asymptomatic from   - Continue GDMT  - c/w toprol, diovan, lasix (prn)   - Continue ASA/statin   - Low salt diet    I spent 5-10 minutes asking, assessing, assisting, arranging and advising heart healthy diet improvements. This included low-salt meals, portion control and health food alternatives. I also encourage 30 minutes of moderate exercise 3-4x a week.       Continue with current medical plan and lifestyle changes.  Return sooner for concerns or questions. If symptoms persist go to the ED  Total duration of face to face visit time 30 minutes.  Total time spent counseling greater than fifty percent of total visit time.  Counseling included discussion regarding imaging findings, diagnosis, possibilities, treatment options, risks and benefits.      No orders of the defined types were placed in this encounter.      Follow up as scheduled. Return to clinic in 6 months  She expressed verbal understanding and agreed with the plan    Thank you for the opportunity to care for this patient. Will be available for questions if needed.

## 2020-08-10 NOTE — PATIENT INSTRUCTIONS
Understanding Coronary Artery Disease (CAD)    To understand coronary artery disease (CAD), you need to know how your heart works. Your heart is a muscle that pumps blood throughout your body. To work right, your heart needs a steady supply of oxygen. It gets this oxygen from blood supplied by the coronary arteries.     Healthy artery   Healthy artery. When a coronary artery is healthy and has no blockages, blood flows through easily. Healthy arteries can easily supply the oxygen-rich blood your heart needs.     Damaged artery   Damaged artery. Coronary artery disease begins when damage to the artery lining leads to the buildup of fat-like substances and cholesterol along the artery wall. This is called plaque. This damage could be caused by things like high blood pressure or smoking. This plaque buildup begins to narrow the arteries carrying blood to the heart. This is called atherosclerosis,     Narrowed artery   Narrowed artery. As more plaque builds up, your artery has trouble supplying blood to your heart muscle when it needs it most, such as during exercise. You may not feel any symptoms when this happens. Or you may feel angina--pressure, tightness, achiness, or pain in your chest, jaw, neck, back, or arm.     Blocked artery   Blocked artery. A piece of plaque may break off and completely block the artery. Or a blood clot may plug the narrowed artery. When this happens, blood flow is blocked from reaching the heart. Without oxygen-rich blood, part of the heart muscle becomes damaged and stops working. You may feel crushing pressure or pain in or around your chest. This is a heart attack (acute myocardial infarction, or AMI) and is a medical emergency.     Date Last Reviewed: 3/28/2016  © 5975-3694 [employer]. [address] [place] [address], [place] [address]. All rights reserved. This information is not intended as a substitute for professional medical care. Always follow your healthcare  professional's instructions.          Aerobic Exercise for a Healthy Heart  Exercise is a lot more than an energy booster and a stress reliever. It also strengthens your heart muscle, lowers your blood pressure and cholesterol, and burns calories. It can also improve your resting muscle tone, and your mood.     Remember, some activity is better than none.    Choose an aerobic activity  Choose an activity that makes your heart and lungs work harder than they do when you rest or walk normally. This aerobic exercise can improve the way your heart and other muscles use oxygen. Make it fun by exercising with a friend and choosing an activity you enjoy. Here are some ideas:  · Walking  · Swimming  · Bicycling  · Stair climbing  · Dancing  · Jogging  · Gardening  Exercise regularly  If you havent been exercising regularly,  get your doctors OK first. Then start slowly.  Here are some tips:  · Begin exercising 3 times a week for 5 to 10 minutes at a time.  · When you feel comfortable, add a few minutes each session.  · Slowly build up to exercising 3 to 4 times each week. Each session should last for 40 minutes, on average, and involve moderate- to vigorous-intensity physical activity.  · If you have been given nitroglycerin, be sure to carry it when you exercise.  · If you get chest pain (angina) when youre exercising, stop what youre doing, take your nitroglycerin, and call your doctor.  Date Last Reviewed: 6/2/2016  © 7874-5023 [employer]. [address] [place] [address], [place] [address]. All rights reserved. This information is not intended as a substitute for professional medical care. Always follow your healthcare professional's instructions.          Eating Heart-Healthy Foods  Eating has a big impact on your heart health. In fact, eating healthier can improve several of your heart risks at once. For instance, it helps you manage weight, cholesterol, and blood pressure. Here are ideas to help you make  heart-healthy changes without giving up all the foods and flavors you love.  Getting started  · Talk with your health care provider about eating plans, such as the DASH or Mediterranean diet. You may also be referred to a dietitian.  · Change a few things at a time. Give yourself time to get used to a few eating changes before adding more.  · Work to create a tasty, healthy eating plan that you can stick to for the rest of your life.    Goals for healthy eating  Below are some tips to improve your eating habits:  · Limit saturated fats and trans fats. Saturated fats raise your levels of cholesterol, so keep these fats to a minimum. They are found in foods such as fatty meats, whole milk, cheese, and palm and coconut oils. Avoid trans fats because they lower good cholesterol as well as raise bad cholesterol. Trans fats are most often found in processed foods.  · Reduce sodium (salt) intake. Eating too much salt may increase your blood pressure. Limit your sodium intake to 2,300 milligrams (mg) per day, or less if your health care provider recommends it. Dining out less often and eating fewer processed foods are two great ways to decrease the amount of salt you consume.  · Managing calories. A calorie is a unit of energy. Your body burns calories for fuel, but if you eat more calories than your body burns, the extras are stored as fat. Your health care provider can help you create a diet plan to manage your calories. This will likely include eating healthier foods as well as exercising regularly. To help you track your progress, keep a diary to record what you eat and how often you exercise.  Choose the right foods  Aim to make these foods staples of your diet. If you have diabetes, you may have different recommendations than what is listed here:  · Fruits and vegetable provide plenty of nutrients without a lot of calories. At meals, fill half your plate with these foods. Split the other half of your plate between  whole grains and lean protein.  · Whole grains are high in fiber and rich in vitamins and nutrients. Good choices include whole-wheat bread, pasta, and brown rice.  · Lean proteins give you nutrition with less fat. Good choices include fish, skinless chicken, and beans.  · Low-fat or nonfat dairy provides nutrients without a lot of fat. Try low-fat or nonfat milk, cheese, or yogurt.  · Healthy fats can be good for you in small amounts. These are unsaturated fats, such as olive oil, nuts, and fish. Try to have at least 2 servings per week of fatty fish such as salmon, sardines, mackerel, rainbow trout, and albacore tuna. These contain omega-3 fatty acids, which are good for your heart. Flaxseed is another source of a heart-healthy fat.  More on heart healthy eating    Read food labels  Healthy eating starts at the grocery store. Be sure to pay attention to food labels on packaged foods. Look for products that are high in fiber and protein, and low in saturated fat, cholesterol, and sodium. Avoid products that contain trans fat. And pay close attention to serving size. For instance, if you plan to eat two servings, double all the numbers on the label.  Prepare food right  A key part of healthy cooking is cutting down on added fat and salt. Look on the internet for lower-fat, lower-sodium recipes. Also, try these tips:  · Remove fat from meat and skin from poultry before cooking.  · Skim fat from the surface of soups and sauces.  · Broil, boil, bake, steam, grill, and microwave food without added fats.  · Choose ingredients that spice up your food without adding calories, fat, or sodium. Try these items: horseradish, hot sauce, lemon, mustard, nonfat salad dressings, and vinegar. For salt-free herbs and spices, try basil, cilantro, cinnamon, pepper, and rosemary.  Date Last Reviewed: 6/25/2015  © 3520-8349 [employer]. [address] [place] [address], [place] [address]. All rights reserved. This information is not  intended as a substitute for professional medical care. Always follow your healthcare professional's instructions.

## 2020-08-10 NOTE — PROGRESS NOTES
Physical Therapy Treatment Note     Name: Christina Tomlinson  Clinic Number: [id number]    Therapy Diagnosis:   Encounter Diagnoses   Name Primary?    Chronic pain of both shoulders Yes    Decreased ROM of right shoulder     Decreased ROM of left shoulder     Muscle weakness of left arm     Muscle weakness of right arm      Physician: Everette Aguirre MD    Visit Date: 8/11/2020    Physician Orders: PT Eval and Treat   Medical Diagnosis from Referral:   M75.101 (ICD-10-CM) - Unspecified rotator cuff tear or rupture of right shoulder, not specified as traumatic   M12.811 (ICD-10-CM) - Other specific arthropathies, not elsewhere classified, right shoulder   M12.812 (ICD-10-CM) - Other specific arthropathies, not elsewhere classified, left shoulder   M75.102 (ICD-10-CM) - Unspecified rotator cuff tear or rupture of left shoulder, not specified as traumatic   Evaluation Date: 6/24/2020  Authorization Period Expiration: 08/19/2020  Plan of Care Expiration: 8/24/2020  Visit # / Visits authorized: 14 / pending    Time In: 10:15 am  Time Out: 11:00 am  Total Billable Time: 45 minutes    Precautions: Standard    Subjective     Pt reports: arthritis has calmed down a little bit.   She was compliant with home exercise program.  Response to previous treatment: slight increase in soreness  Functional change: improved ROM in shoulders    Pain: 2/10  Location: bilateral shoulder  L>R    Objective     Christina received therapeutic exercises to develop strength, ROM and flexibility for 45 minutes 1:1 with PTA including:    Date  8/11/2020 8/6/2020 8/4/2020 7/29/2020 7/27/2020 7/22/2020 7/20/2020 07/15/2020 07/13/2020 07/08/2020 7/6/2020 6/29/2020 6/26/2020 6/24/2020   VISIT 14/pending 13/pending 12/12 11/12 10/12 9/12 8/12 7/12 6/12 5/12 4/12 3/12 2/12 1/12                      POC EXP. DATE 8/24/2020 8/24/2020 8/24/2020 8/24/2020 8/24/2020 8/24/2020 8/24/2020 8/24/2020 8/24/2020 8/24/2020 8/24/2020 8/24/2020 8/24/2020  "8/24/2020   VISIT AMOUNT  MEDICARE TOTAL 90.96  1235.24 90.96  1144.28 90.96  1053.32 90.96  962.36 90.96  871.40 90.96  780.44 90.96  689.48 60.64  598.52 90.96  537.68 90.96  446.72 90.96  355.76 90.96  264.80 60.64  173.84 113.20   FACE-TO-FACE 8/22/2020 8/22/2020 8/22/2020 8/22/2020 8/22/2020 8/22/2020 7/24/2020 7/24/2020 7/24/2020 7/24/2020 7/24/2020 7/24/2020 7/24/2020 7/24/2020   FOTO -- -- -- -- --  -- -- -- 5/5 4/5 3/5 2/5 1/5                    UBE  Next?                 Pulley for flexion Flex 2'  ABD 2' Flex 2'  abd 2' Flex 2'  abd 2' Flex 2'  ABD 2' 2' 2' 2' 2' 2' 2' 2' 2' 2' next   TABLE:                  Scapular retraction NT 30 x 3" 25 x 3" 25 x 3" 20 x 3" 20 x 3" 20 x 3" 20 x 3" 20 x 3" 15 x 3" 15 x 3" 15 x 3"  15 x 3" 10 x 3"   Wand flexion -- -- -- -- -- -- -- -- -- -- -- -- 1 x 10 1 x 10   Post shoulder roll NT -- 2 x 10 2 x 10 2 x 10 2 x 10 2 x 10 2 x10 2 x 10  1 x 15  1 x 15 1 x 15 1 x 10     Table slides:  - Flex  - ABD  - Scaption  - ER Wall  1 x 15  1 x 15 B  1 x 15  -- Wall  1 x 10  1 x 10  Not today Wall  1 x 10  1 x 10  Not today  -- Wall  1 x 10  1 x 10  1 x 10  -- Wall  1 x 5  1 x 5    --   2 x 10  2 x 10     2 x 10   2 x 10  2 x 10     2 x 10     Not today  Not today    Not today   2 x 10  2 x 10     2 x 10     2 x 10  2 x 10    2 x 10     1 x 15  1 x 15    1 x 15   1 x 15  1 x 15    1 x 15   1 x 10  1 x 10    1 x 10     Butterflies  Romel wings Next?  Next?                STANDING:                  Pec stretch 10 x 10"  Low 10 x 10"   Low 10 x 10" 10 x 10"  Low 5 x 10"  Arms low 5 x 10"  Arms low 5 x 10"  Arms low 5 x 10"   Arms low 5 x 10"   Arms low  5 x 10"   Arms low  3 x 10" next next next   Single UE ER stretch doorway 5 x 10" B                Functional IR str with strap/towel 10 x 10" B 10 x 10" B 10 x 10" B 10 x 10" B 5 x 10" B 1 x 10 1 x 10 1 x 10 NT NT 3 x 10"      Row 3 x 10 BTB 3 x 10 BTB 3 x 10 GTB 3 x 10 GTB 3 x 10 GTB 3 x 10 GTB 2 x 10 GTB 2 x 10 RTB 2 x 10 RTB 2 x 10 " RTB 2 x 10 next next next   I's 3 x 10 BTB 3 x 10 BTB 3 x 10 GTB 2 x 10 GTB   2 x 10 RTB            Corazon's B GTB  2 x 10 ea B RTB  2 x 10 ea B RTB  2 x 10 ea  BUE RTB  2 x 10 ea NT NT NT Not today 1 x 10 ea RTB -- -- next next next   Wall walks NT NT -- NT (B) Flex/ABD   1 x 5 ea Flex  1 x 10 Flex  1 x 10 Flex/abd  1 x 10 Flex/ABD  1 x 10  Flex/ABD  1 x 10  Flex/ABD   1 x 5 ea 1 x 5      AROM (B)  - Flexion   - Abduction   - ABD w/thumb up  - Scaption    1 x 15 x 1#  2 x 15  2 x 15  1 x 15 x 1#(L)  1 x 15 x 2# (R)   2 x 10 x 1#  2 x 10  Not today  2 x 10 x 1#   2 x 10 x 1#  Not today  Not today  Not today   2 x 10 x 1#  2 x 10  1 x 10  2 x 10 x 1#   2 x 10 x 1#  2 x 10    2 x 10 x 1#   1 x 20  1 x 20    1 x 15   1 x 20  1 x 10    1 x 10   1 x 10  1 x 10    1 x 10   1 x 10  1 x 10     1 x 10     1 x 5  1 x 5    1 x 5       D1 flexion NT Not today Not today 1 x 15 B 1 x 15 B 1 x 15 1 x 10 1 x 10          OH Ball Toss Next?                                 Initials SB MA DG SB SB MA MA DG BJ BJ SB DG DG MA       Home Exercises Provided and Patient Education Provided     Education provided:   - continued compliance with HEP    Written Home Exercises Provided: yes.  Exercises were reviewed and Christina was able to demonstrate them prior to the end of the session.  Christina demonstrated good  understanding of the education provided.     See EMR under Patient Instructions for exercises provided 6/26/2020.    Assessment     Christina returns to therapy this morning with reduced pain levels and improved arthritic symptoms. Pt was able to complete therex routine and progressions well without complaints noted prior to leaving the clinic. Pt's AROM has consistently been improving each visit, allowing for greater resistance with strengthening exercises. Pt continues to demonstrate limited OH motion, which will be challenged next visit.      Christina is progressing well towards her goals.   Pt prognosis is Good.     Pt will continue to  benefit from skilled outpatient physical therapy to address the deficits listed in the problem list box on initial evaluation, provide pt/family education and to maximize pt's level of independence in the home and community environment.     Pt's spiritual, cultural and educational needs considered and pt agreeable to plan of care and goals.     Anticipated barriers to physical therapy: none    Goals:   Short Term Goals:  4 weeks  1. Patient will be compliant with HEP to promote the independent management of current diagnosis. met  2. Patient will increase bilateral shoulder flexion to 110 degrees for function of grooming. met  3. Patient will increase bilateral shoulder functiona ER to reach C7. partially met (right reaches C4)  4. Patient will report a decrease in complaints of bilateral shoulder pain to 5/10 during performance of ADL's for independence of self care activities.  In progress, not met     Long Term Goals:  8 weeks  1. Patient will increase bilateral shoulder strength to 4/5 to improve tolerance to normal house work.. In progress, not met  2. Patient will increase bilateral shoulder flexion to 125 degrees in order to place dishes in overhead cabinets independently for self care independence.  In progress, not met  3. Patient will increase bilateral shoulder functional IR to reach L3 for function of dressing. In progress, not met  4. Patient will increase scapular stabilization strength to 4/5 to improve tolerance to normal lifting. In progress, not met  5. Patient will improve FOTO limitation status from 47% to 36%  indicating increased functional mobility. In progress, not met    Plan     Continue with the plan of care. Add OH activities and mat stretches.     Chelly Carter, PTA 1/6

## 2020-08-10 NOTE — PROGRESS NOTES
Patient, Christina Tomlinson (MRN #[id number]), presented with a recorded BMI of 37.11 kg/m^2 and a documented comorbidity(s):  - Hypertension  - Atrial Fibrillation  to which the severe obesity is a contributing factor. This is consistent with the definition of severe obesity (BMI 35.0-39.9) with comorbidity (ICD-10 E66.01, Z68.35). The patient's severe obesity was monitored, evaluated, addressed and/or treated. This addendum to the medical record is made on 08/10/2020.

## 2020-08-11 ENCOUNTER — CLINICAL SUPPORT (OUTPATIENT)
Dept: REHABILITATION | Facility: HOSPITAL | Age: 65
End: 2020-08-11
Payer: MEDICARE

## 2020-08-11 DIAGNOSIS — M25.612 DECREASED ROM OF LEFT SHOULDER: ICD-10-CM

## 2020-08-11 DIAGNOSIS — M62.81 MUSCLE WEAKNESS OF LEFT ARM: ICD-10-CM

## 2020-08-11 DIAGNOSIS — G89.29 CHRONIC PAIN OF BOTH SHOULDERS: Primary | ICD-10-CM

## 2020-08-11 DIAGNOSIS — M25.611 DECREASED ROM OF RIGHT SHOULDER: ICD-10-CM

## 2020-08-11 DIAGNOSIS — M25.512 CHRONIC PAIN OF BOTH SHOULDERS: Primary | ICD-10-CM

## 2020-08-11 DIAGNOSIS — M25.511 CHRONIC PAIN OF BOTH SHOULDERS: Primary | ICD-10-CM

## 2020-08-11 DIAGNOSIS — M62.81 MUSCLE WEAKNESS OF RIGHT ARM: ICD-10-CM

## 2020-08-11 PROCEDURE — 97110 THERAPEUTIC EXERCISES: CPT | Mod: PO,CQ

## 2020-08-12 NOTE — PROGRESS NOTES
Physical Therapy Treatment Note     Name: Christina Tomlinson  Clinic Number: [id number]    Therapy Diagnosis:   Encounter Diagnoses   Name Primary?    Chronic pain of both shoulders Yes    Decreased ROM of right shoulder     Decreased ROM of left shoulder     Muscle weakness of left arm     Muscle weakness of right arm      Physician: Everette Aguirre MD    Visit Date: 8/13/2020    Physician Orders: PT Eval and Treat   Medical Diagnosis from Referral:   M75.101 (ICD-10-CM) - Unspecified rotator cuff tear or rupture of right shoulder, not specified as traumatic   M12.811 (ICD-10-CM) - Other specific arthropathies, not elsewhere classified, right shoulder   M12.812 (ICD-10-CM) - Other specific arthropathies, not elsewhere classified, left shoulder   M75.102 (ICD-10-CM) - Unspecified rotator cuff tear or rupture of left shoulder, not specified as traumatic   Evaluation Date: 6/24/2020  Authorization Period Expiration: 08/19/2020  Plan of Care Expiration: 8/24/2020  Visit # / Visits authorized: 15 / pending    Time In: 9:30 am  Time Out: 10:15 am  Total Billable Time: 45 minutes    Precautions: Standard    Subjective     Pt reports: arthritis acting up again in R hand, but shoulders are feeling okay.   She was compliant with home exercise program.  Response to previous treatment: slight increase in soreness  Functional change: improved ROM in shoulders    Pain: 3/10  Location: bilateral shoulder  L>R    Objective     Christina received therapeutic exercises to develop strength, ROM and flexibility for 45 minutes 1:1 with PTA including:    Date  8/13/2020 8/11/2020 8/6/2020 8/4/2020 7/29/2020 7/27/2020 7/22/2020 7/20/2020 07/15/2020 07/13/2020 07/08/2020 7/6/2020 6/29/2020 6/26/2020 6/24/2020   VISIT 15/pending 14/pending 13/pending 12/12 11/12 10/12 9/12 8/12 7/12 6/12 5/12 4/12 3/12 2/12 1/12   POC EXP. DATE 8/24/2020 8/24/2020 8/24/2020 8/24/2020 8/24/2020 8/24/2020 8/24/2020 8/24/2020 8/24/2020 8/24/2020  "8/24/2020 8/24/2020 8/24/2020 8/24/2020 8/24/2020   VISIT AMOUNT  MEDICARE TOTAL 90.96  1326.20 90.96  1235.24 90.96  1144.28 90.96  1053.32 90.96  962.36 90.96  871.40 90.96  780.44 90.96  689.48 60.64  598.52 90.96  537.68 90.96  446.72 90.96  355.76 90.96  264.80 60.64  173.84 113.20   FACE-TO-FACE 8/22/2020 8/22/2020 8/22/2020 8/22/2020 8/22/2020 8/22/2020 8/22/2020 7/24/2020 7/24/2020 7/24/2020 7/24/2020 7/24/2020 7/24/2020 7/24/2020 7/24/2020   FOTO -- -- -- -- -- --  -- -- -- 5/5 4/5 3/5 2/5 1/5                     UBE  Next? Next?                 Pulley for flexion Flex 2'  ABD 2' Flex 2'  ABD 2' Flex 2'  abd 2' Flex 2'  abd 2' Flex 2'  ABD 2' 2' 2' 2' 2' 2' 2' 2' 2' 2' next   TABLE:                   Scapular retraction NT NT 30 x 3" 25 x 3" 25 x 3" 20 x 3" 20 x 3" 20 x 3" 20 x 3" 20 x 3" 15 x 3" 15 x 3" 15 x 3"  15 x 3" 10 x 3"   Wand flexion -- -- -- -- -- -- -- -- -- -- -- -- -- 1 x 10 1 x 10   Post shoulder roll NT NT -- 2 x 10 2 x 10 2 x 10 2 x 10 2 x 10 2 x10 2 x 10  1 x 15  1 x 15 1 x 15 1 x 10     Table slides:  - Flex  - ABD  - Scaption  - ER Wall  1 x 15  1 x 15 B  1 x 15   -- Wall  1 x 15  1 x 15 B  1 x 15  -- Wall  1 x 10  1 x 10  Not today Wall  1 x 10  1 x 10  Not today  -- Wall  1 x 10  1 x 10  1 x 10  -- Wall  1 x 5  1 x 5    --   2 x 10  2 x 10     2 x 10   2 x 10  2 x 10     2 x 10     Not today  Not today    Not today   2 x 10  2 x 10     2 x 10     2 x 10  2 x 10    2 x 10     1 x 15  1 x 15    1 x 15   1 x 15  1 x 15    1 x 15   1 x 10  1 x 10    1 x 10     Butterflies 2 x 10 Next?                Romel wings 2 x 10 Next?                Supine T's Next?                 STANDING:                   Pec stretch 10 x 10"  Low 10 x 10"  Low 10 x 10"   Low 10 x 10" 10 x 10"  Low 5 x 10"  Arms low 5 x 10"  Arms low 5 x 10"  Arms low 5 x 10"   Arms low 5 x 10"   Arms low  5 x 10"   Arms low  3 x 10" next next next   Single UE ER stretch doorway 5 x 10" B 5 x 10" B                Functional IR " "str with strap/towel 10 x 10" B 10 x 10" B 10 x 10" B 10 x 10" B 10 x 10" B 5 x 10" B 1 x 10 1 x 10 1 x 10 NT NT 3 x 10"      Row 3 x 10 BTB 3 x 10 BTB 3 x 10 BTB 3 x 10 GTB 3 x 10 GTB 3 x 10 GTB 3 x 10 GTB 2 x 10 GTB 2 x 10 RTB 2 x 10 RTB 2 x 10 RTB 2 x 10 next next next   I's 3 x 10 BTB 3 x 10 BTB 3 x 10 BTB 3 x 10 GTB 2 x 10 GTB   2 x 10 RTB            Corazon's B GTB  2 x 15 ea B GTB  2 x 10 ea B RTB  2 x 10 ea B RTB  2 x 10 ea  BUE RTB  2 x 10 ea NT NT NT Not today 1 x 10 ea RTB -- -- next next next   Wall walks NT NT NT -- NT (B) Flex/ABD   1 x 5 ea Flex  1 x 10 Flex  1 x 10 Flex/abd  1 x 10 Flex/ABD  1 x 10  Flex/ABD  1 x 10  Flex/ABD   1 x 5 ea 1 x 5      AROM (B)  - Flexion   - Abduction   - ABD w/thumb up  - Scaption  NT     1 x 15 x 1#  2 x 15  2 x 15  1 x 15 x 1#(L)  1 x 15 x 2# (R)   2 x 10 x 1#  2 x 10  Not today  2 x 10 x 1#   2 x 10 x 1#  Not today  Not today  Not today   2 x 10 x 1#  2 x 10  1 x 10  2 x 10 x 1#   2 x 10 x 1#  2 x 10    2 x 10 x 1#   1 x 20  1 x 20    1 x 15   1 x 20  1 x 10    1 x 10   1 x 10  1 x 10    1 x 10   1 x 10  1 x 10     1 x 10     1 x 5  1 x 5    1 x 5       D1 flexion NT NT Not today Not today 1 x 15 B 1 x 15 B 1 x 15 1 x 10 1 x 10          OH Ball Toss 20 x orange Next?                                  Initials SB SB MA DG SB SB MA MA DG BJ BJ SB DG DG MA       Home Exercises Provided and Patient Education Provided     Education provided:   - continued compliance with HEP    Written Home Exercises Provided: yes.  Exercises were reviewed and Christina was able to demonstrate them prior to the end of the session.  Christina demonstrated good  understanding of the education provided.     See EMR under Patient Instructions for exercises provided 6/26/2020.    Assessment     Christina returns to therapy this morning with increased pain levels due to arthritis pain, although shoulders are still feeling okay. Pt tolerated all progressions and new exercises in therex routine well " without increase in symptoms prior to leaving clinic. Pt continues to make great progress towards improved AROM into OH activities without pain. Pt was able to achieve functional position of butterfly in supine with minimal pain noted.      Christina is progressing well towards her goals.   Pt prognosis is Good.     Pt will continue to benefit from skilled outpatient physical therapy to address the deficits listed in the problem list box on initial evaluation, provide pt/family education and to maximize pt's level of independence in the home and community environment.     Pt's spiritual, cultural and educational needs considered and pt agreeable to plan of care and goals.     Anticipated barriers to physical therapy: none    Goals:   Short Term Goals:  4 weeks  1. Patient will be compliant with HEP to promote the independent management of current diagnosis. met  2. Patient will increase bilateral shoulder flexion to 110 degrees for function of grooming. met  3. Patient will increase bilateral shoulder functiona ER to reach C7. partially met (right reaches C4)  4. Patient will report a decrease in complaints of bilateral shoulder pain to 5/10 during performance of ADL's for independence of self care activities.  In progress, not met     Long Term Goals:  8 weeks  1. Patient will increase bilateral shoulder strength to 4/5 to improve tolerance to normal house work.. In progress, not met  2. Patient will increase bilateral shoulder flexion to 125 degrees in order to place dishes in overhead cabinets independently for self care independence.  In progress, not met  3. Patient will increase bilateral shoulder functional IR to reach L3 for function of dressing. In progress, not met  4. Patient will increase scapular stabilization strength to 4/5 to improve tolerance to normal lifting. In progress, not met  5. Patient will improve FOTO limitation status from 47% to 36%  indicating increased functional mobility. In progress,  not met    Plan     Continue with the plan of care. Add supine t's and resume AROM exercises.     Chelly Carter, PTA 2/6

## 2020-08-13 ENCOUNTER — CLINICAL SUPPORT (OUTPATIENT)
Dept: REHABILITATION | Facility: HOSPITAL | Age: 65
End: 2020-08-13
Payer: MEDICARE

## 2020-08-13 DIAGNOSIS — M62.81 MUSCLE WEAKNESS OF LEFT ARM: ICD-10-CM

## 2020-08-13 DIAGNOSIS — M25.512 CHRONIC PAIN OF BOTH SHOULDERS: Primary | ICD-10-CM

## 2020-08-13 DIAGNOSIS — M25.612 DECREASED ROM OF LEFT SHOULDER: ICD-10-CM

## 2020-08-13 DIAGNOSIS — G89.29 CHRONIC PAIN OF BOTH SHOULDERS: Primary | ICD-10-CM

## 2020-08-13 DIAGNOSIS — M62.81 MUSCLE WEAKNESS OF RIGHT ARM: ICD-10-CM

## 2020-08-13 DIAGNOSIS — M25.611 DECREASED ROM OF RIGHT SHOULDER: ICD-10-CM

## 2020-08-13 DIAGNOSIS — M25.511 CHRONIC PAIN OF BOTH SHOULDERS: Primary | ICD-10-CM

## 2020-08-13 PROCEDURE — 97110 THERAPEUTIC EXERCISES: CPT | Mod: PO,CQ

## 2020-08-18 ENCOUNTER — CLINICAL SUPPORT (OUTPATIENT)
Dept: REHABILITATION | Facility: HOSPITAL | Age: 65
End: 2020-08-18
Payer: MEDICARE

## 2020-08-18 DIAGNOSIS — M62.81 MUSCLE WEAKNESS OF RIGHT ARM: ICD-10-CM

## 2020-08-18 DIAGNOSIS — M25.612 DECREASED ROM OF LEFT SHOULDER: ICD-10-CM

## 2020-08-18 DIAGNOSIS — M25.511 CHRONIC PAIN OF BOTH SHOULDERS: ICD-10-CM

## 2020-08-18 DIAGNOSIS — M25.512 CHRONIC PAIN OF BOTH SHOULDERS: ICD-10-CM

## 2020-08-18 DIAGNOSIS — G89.29 CHRONIC PAIN OF BOTH SHOULDERS: ICD-10-CM

## 2020-08-18 DIAGNOSIS — M62.81 MUSCLE WEAKNESS OF LEFT ARM: ICD-10-CM

## 2020-08-18 DIAGNOSIS — M25.611 DECREASED ROM OF RIGHT SHOULDER: ICD-10-CM

## 2020-08-18 PROCEDURE — 97110 THERAPEUTIC EXERCISES: CPT | Mod: PO

## 2020-08-18 NOTE — PROGRESS NOTES
Physical Therapy Treatment Note     Name: Christina Tomlinson  Clinic Number: [id number]    Therapy Diagnosis:   Encounter Diagnoses   Name Primary?    Chronic pain of both shoulders     Decreased ROM of right shoulder     Decreased ROM of left shoulder     Muscle weakness of left arm     Muscle weakness of right arm      Physician: Everette Aguirre MD    Visit Date: 8/18/2020    Physician Orders: PT Eval and Treat   Medical Diagnosis from Referral:   M75.101 (ICD-10-CM) - Unspecified rotator cuff tear or rupture of right shoulder, not specified as traumatic   M12.811 (ICD-10-CM) - Other specific arthropathies, not elsewhere classified, right shoulder   M12.812 (ICD-10-CM) - Other specific arthropathies, not elsewhere classified, left shoulder   M75.102 (ICD-10-CM) - Unspecified rotator cuff tear or rupture of left shoulder, not specified as traumatic   Evaluation Date: 6/24/2020  Authorization Period Expiration: 09/20/2020  Plan of Care Expiration: 8/24/2020  Visit # / Visits authorized: 2 / 12    Time In: 8:45 am  Time Out: 9:30 am  Total Billable Time: 42 minutes    Precautions: Standard    Subjective     Pt reports:feeling stiff this morning, cleaned out her garage this weekend  She was compliant with home exercise program.  Response to previous treatment: a little soreness after last visit  Functional change: improved ROM in shoulders    Pain: 3/10  Location: bilateral shoulder  L>R    Objective     Christina received therapeutic exercises to develop strength, ROM and flexibility for 42 minutes 1:1 with PT including:    Date  8/18/2020 8/13/2020 8/11/2020 8/6/2020 8/4/2020 7/29/2020 7/27/2020 7/22/2020 7/20/2020 07/15/2020 07/13/2020 07/08/2020 7/6/2020 6/29/2020 6/26/2020 6/24/2020   VISIT 2/12 09/20/2020 1/12 09/20/2020 14/pending 13/pending 12/12 11/12 10/12 9/12 8/12 7/12 6/12 5/12 4/12 3/12 2/12 1/12   POC EXP. DATE 8/24/2020 8/24/2020 8/24/2020 8/24/2020 8/24/2020 8/24/2020 8/24/2020 8/24/2020  "8/24/2020 8/24/2020 8/24/2020 8/24/2020 8/24/2020 8/24/2020 8/24/2020 8/24/2020   VISIT AMOUNT  MEDICARE TOTAL 90.96  1417.16 90.96  1326.20 90.96  1235.24 90.96  1144.28 90.96  1053.32 90.96  962.36 90.96  871.40 90.96  780.44 90.96  689.48 60.64  598.52 90.96  537.68 90.96  446.72 90.96  355.76 90.96  264.80 60.64  173.84 113.20   FACE-TO-FACE 8/22/2020 8/22/2020 8/22/2020 8/22/2020 8/22/2020 8/22/2020 8/22/2020 8/22/2020 7/24/2020 7/24/2020 7/24/2020 7/24/2020 7/24/2020 7/24/2020 7/24/2020 7/24/2020   FOTO -- -- -- -- -- -- --  -- -- -- 5/5 4/5 3/5 2/5 1/5                      UBE  L1 x 3' Next? Next?                 Pulley for flexion -- Flex 2'  ABD 2' Flex 2'  ABD 2' Flex 2'  abd 2' Flex 2'  abd 2' Flex 2'  ABD 2' 2' 2' 2' 2' 2' 2' 2' 2' 2' next   TABLE:                    Scapular retraction Not today NT NT 30 x 3" 25 x 3" 25 x 3" 20 x 3" 20 x 3" 20 x 3" 20 x 3" 20 x 3" 15 x 3" 15 x 3" 15 x 3"  15 x 3" 10 x 3"   Wand flexion -- -- -- -- -- -- -- -- -- -- -- -- -- -- 1 x 10 1 x 10   Post shoulder roll Not today NT NT -- 2 x 10 2 x 10 2 x 10 2 x 10 2 x 10 2 x10 2 x 10  1 x 15  1 x 15 1 x 15 1 x 10     Table slides:  - Flex  - ABD  - Scaption  - ER Wall  1 x 15  1 x 10  1 x 10  -- Wall  1 x 15  1 x 15 B  1 x 15   -- Wall  1 x 15  1 x 15 B  1 x 15  -- Wall  1 x 10  1 x 10  Not today Wall  1 x 10  1 x 10  Not today  -- Wall  1 x 10  1 x 10  1 x 10  -- Wall  1 x 5  1 x 5    --   2 x 10  2 x 10     2 x 10   2 x 10  2 x 10     2 x 10     Not today  Not today    Not today   2 x 10  2 x 10     2 x 10     2 x 10  2 x 10    2 x 10     1 x 15  1 x 15    1 x 15   1 x 15  1 x 15    1 x 15   1 x 10  1 x 10    1 x 10     Butterflies 2 x 10  2 x 10 Next?                Romel wings 2 x 10 2 x 10 Next?                Supine T's Next? Next?                 STANDING:                    Pec stretch 10 x 10" 10 x 10"  Low 10 x 10"  Low 10 x 10"   Low 10 x 10" 10 x 10"  Low 5 x 10"  Arms low 5 x 10"  Arms low 5 x 10"  Arms low 5 x " "10"   Arms low 5 x 10"   Arms low  5 x 10"   Arms low  3 x 10" next next next   Single UE ER stretch doorway 5 x 10" B 5 x 10" B 5 x 10" B                Functional IR str with strap/towel 10 x 10" 10 x 10" B 10 x 10" B 10 x 10" B 10 x 10" B 10 x 10" B 5 x 10" B 1 x 10 1 x 10 1 x 10 NT NT 3 x 10"      Row 3 x 10 BTB 3 x 10 BTB 3 x 10 BTB 3 x 10 BTB 3 x 10 GTB 3 x 10 GTB 3 x 10 GTB 3 x 10 GTB 2 x 10 GTB 2 x 10 RTB 2 x 10 RTB 2 x 10 RTB 2 x 10 next next next   I's  T's 3 x 10 BTB  1 x 10 RTB 3 x 10 BTB 3 x 10 BTB 3 x 10 BTB 3 x 10 GTB 2 x 10 GTB   2 x 10 RTB            Corazon's B GTB  2 x 15 ea B GTB  2 x 15 ea B GTB  2 x 10 ea B RTB  2 x 10 ea B RTB  2 x 10 ea  BUE RTB  2 x 10 ea NT NT NT Not today 1 x 10 ea RTB -- -- next next next   Wall walks -- NT NT NT -- NT (B) Flex/ABD   1 x 5 ea Flex  1 x 10 Flex  1 x 10 Flex/abd  1 x 10 Flex/ABD  1 x 10  Flex/ABD  1 x 10  Flex/ABD   1 x 5 ea 1 x 5      AROM (B)  - Flexion   - Abduction   - ABD w/thumb up  - Scaption    1 x 15 x 1#  1 x 10 x 1#  1 x 10 x 1#  1 x 15 x 1# (L)  1 x 15 x 2# (R) NT     1 x 15 x 1#  2 x 15  2 x 15  1 x 15 x 1#(L)  1 x 15 x 2# (R)   2 x 10 x 1#  2 x 10  Not today  2 x 10 x 1#   2 x 10 x 1#  Not today  Not today  Not today   2 x 10 x 1#  2 x 10  1 x 10  2 x 10 x 1#   2 x 10 x 1#  2 x 10    2 x 10 x 1#   1 x 20  1 x 20    1 x 15   1 x 20  1 x 10    1 x 10   1 x 10  1 x 10    1 x 10   1 x 10  1 x 10     1 x 10     1 x 5  1 x 5    1 x 5       D1 flexion Not today NT NT Not today Not today 1 x 15 B 1 x 15 B 1 x 15 1 x 10 1 x 10          OH Ball Toss Not today 20 x orange Next?                                   Initials DG SB SB MA DG SB SB MA MA DG BJ BJ SB DG DG MA       Home Exercises Provided and Patient Education Provided     Education provided:   - continued compliance with HEP    Written Home Exercises Provided: yes.  Exercises were reviewed and Christina was able to demonstrate them prior to the end of the session.  Christina demonstrated good  " understanding of the education provided.     See EMR under Patient Instructions for exercises provided 6/26/2020.    Assessment     Christina was able to perform all of today's new exercises and progressions with no increase in symptoms prior to leaving the clinic. She required occasional verbal cues to avoid substitutions as she fatigues with theraband and standing strengthening exercises. She was able to demonstrate functional ROM with wall slides with no complaints of pain or discomfort.      Christina is progressing well towards her goals.   Pt prognosis is Good.     Pt will continue to benefit from skilled outpatient physical therapy to address the deficits listed in the problem list box on initial evaluation, provide pt/family education and to maximize pt's level of independence in the home and community environment.     Pt's spiritual, cultural and educational needs considered and pt agreeable to plan of care and goals.     Anticipated barriers to physical therapy: none    Goals:   Short Term Goals:  4 weeks  1. Patient will be compliant with HEP to promote the independent management of current diagnosis. met  2. Patient will increase bilateral shoulder flexion to 110 degrees for function of grooming. met  3. Patient will increase bilateral shoulder functiona ER to reach C7. partially met (right reaches C4)  4. Patient will report a decrease in complaints of bilateral shoulder pain to 5/10 during performance of ADL's for independence of self care activities.  In progress, not met     Long Term Goals:  8 weeks  1. Patient will increase bilateral shoulder strength to 4/5 to improve tolerance to normal house work.. In progress, not met  2. Patient will increase bilateral shoulder flexion to 125 degrees in order to place dishes in overhead cabinets independently for self care independence.  In progress, not met  3. Patient will increase bilateral shoulder functional IR to reach L3 for function of dressing. In progress,  not met  4. Patient will increase scapular stabilization strength to 4/5 to improve tolerance to normal lifting. In progress, not met  5. Patient will improve FOTO limitation status from 47% to 36%  indicating increased functional mobility. In progress, not met    Plan     Continue with the plan of care. Add supine t's and attempt side lying flexion and abduction next visit.      Cora Degroot, PT

## 2020-08-20 ENCOUNTER — CLINICAL SUPPORT (OUTPATIENT)
Dept: REHABILITATION | Facility: HOSPITAL | Age: 65
End: 2020-08-20
Payer: MEDICARE

## 2020-08-20 DIAGNOSIS — G89.29 CHRONIC PAIN OF BOTH SHOULDERS: ICD-10-CM

## 2020-08-20 DIAGNOSIS — M25.611 DECREASED ROM OF RIGHT SHOULDER: ICD-10-CM

## 2020-08-20 DIAGNOSIS — M25.512 CHRONIC PAIN OF BOTH SHOULDERS: ICD-10-CM

## 2020-08-20 DIAGNOSIS — M25.511 CHRONIC PAIN OF BOTH SHOULDERS: ICD-10-CM

## 2020-08-20 DIAGNOSIS — M62.81 MUSCLE WEAKNESS OF RIGHT ARM: ICD-10-CM

## 2020-08-20 DIAGNOSIS — M25.612 DECREASED ROM OF LEFT SHOULDER: ICD-10-CM

## 2020-08-20 DIAGNOSIS — M62.81 MUSCLE WEAKNESS OF LEFT ARM: ICD-10-CM

## 2020-08-20 PROCEDURE — 97110 THERAPEUTIC EXERCISES: CPT | Mod: PO

## 2020-08-20 NOTE — PROGRESS NOTES
Physical Therapy Treatment Note     Name: Christina Tomlinson  Clinic Number: [id number]    Therapy Diagnosis:   Encounter Diagnoses   Name Primary?    Chronic pain of both shoulders     Decreased ROM of right shoulder     Decreased ROM of left shoulder     Muscle weakness of left arm     Muscle weakness of right arm      Physician: Everette Aguirre MD    Visit Date: 8/20/2020    Physician Orders: PT Eval and Treat   Medical Diagnosis from Referral:   M75.101 (ICD-10-CM) - Unspecified rotator cuff tear or rupture of right shoulder, not specified as traumatic   M12.811 (ICD-10-CM) - Other specific arthropathies, not elsewhere classified, right shoulder   M12.812 (ICD-10-CM) - Other specific arthropathies, not elsewhere classified, left shoulder   M75.102 (ICD-10-CM) - Unspecified rotator cuff tear or rupture of left shoulder, not specified as traumatic   Evaluation Date: 6/24/2020  Authorization Period Expiration: 09/20/2020  Plan of Care Expiration: 8/24/2020  Visit # / Visits authorized: 3 / 12    Time In: 12:00 pm  Time Out: 12:45 am  Total Billable Time: 42 minutes    Precautions: Standard    Subjective     Pt reports: having overall decreased shoulder pain and improved mobility since beginning therapy..  She was compliant with home exercise program.  Response to previous treatment: a little soreness after last visit  Functional change: improved ROM in shoulders    Pain: 3/10  Location: bilateral shoulder  L>R    Objective     Christina received therapeutic exercises to develop strength, ROM and flexibility for 42 minutes 1:1 with PT including:    Date  8/20/2020 8/18/2020 8/13/2020 8/11/2020 8/6/2020 8/4/2020 7/29/2020 7/27/2020 7/22/2020 7/20/2020 07/15/2020 07/13/2020 07/08/2020 7/6/2020 6/29/2020 6/26/2020 6/24/2020   VISIT 3/12  9/20/2020 2/12  09/20/2020 1/12  09/20/2020 14/pending 13/pending 12/12 11/12 10/12 9/12 8/12 7/12 6/12 5/12 4/12 3/12 2/12 1/12   POC EXP. DATE 8/24/2020 8/24/2020 8/24/2020  "8/24/2020 8/24/2020 8/24/2020 8/24/2020 8/24/2020 8/24/2020 8/24/2020 8/24/2020 8/24/2020 8/24/2020 8/24/2020 8/24/2020 8/24/2020 8/24/2020   VISIT AMOUNT  MEDICARE TOTAL 90.96  1508.12 90.96  1417.16 90.96  1326.20 90.96  1235.24 90.96  1144.28 90.96  1053.32 90.96  962.36 90.96  871.40 90.96  780.44 90.96  689.48 60.64  598.52 90.96  537.68 90.96  446.72 90.96  355.76 90.96  264.80 60.64  173.84 113.20   FACE-TO-FACE 8/22/2020 8/22/2020 8/22/2020 8/22/2020 8/22/2020 8/22/2020 8/22/2020 8/22/2020 8/22/2020 7/24/2020 7/24/2020 7/24/2020 7/24/2020 7/24/2020 7/24/2020 7/24/2020 7/24/2020   FOTO  -- -- -- -- -- -- --  -- -- -- 5/5 4/5 3/5 2/5 1/5                       UBE  L1 x 3' L1 x 3' Next? Next?                 Pulley for flexion Flex 2'  abd 2' -- Flex 2'  ABD 2' Flex 2'  ABD 2' Flex 2'  abd 2' Flex 2'  abd 2' Flex 2'  ABD 2' 2' 2' 2' 2' 2' 2' 2' 2' 2' next   TABLE:                     Scapular retraction -- Not today NT NT 30 x 3" 25 x 3" 25 x 3" 20 x 3" 20 x 3" 20 x 3" 20 x 3" 20 x 3" 15 x 3" 15 x 3" 15 x 3"  15 x 3" 10 x 3"   Wand flexion -- -- -- -- -- -- -- -- -- -- -- -- -- -- -- 1 x 10 1 x 10   Post shoulder roll -- Not today NT NT -- 2 x 10 2 x 10 2 x 10 2 x 10 2 x 10 2 x10 2 x 10  1 x 15  1 x 15 1 x 15 1 x 10     Table slides:  - Flex  - ABD  - Scaption  - ER Wall  1 x 15  1 x 10  1 x 10 Wall  1 x 15  1 x 10  1 x 10  -- Wall  1 x 15  1 x 15 B  1 x 15   -- Wall  1 x 15  1 x 15 B  1 x 15  -- Wall  1 x 10  1 x 10  Not today Wall  1 x 10  1 x 10  Not today  -- Wall  1 x 10  1 x 10  1 x 10  -- Wall  1 x 5  1 x 5    --   2 x 10  2 x 10     2 x 10   2 x 10  2 x 10     2 x 10     Not today  Not today    Not today   2 x 10  2 x 10     2 x 10     2 x 10  2 x 10    2 x 10     1 x 15  1 x 15    1 x 15   1 x 15  1 x 15    1 x 15   1 x 10  1 x 10    1 x 10     Butterflies  2 x 10  2 x 10 Next?                Romel wings  2 x 10 2 x 10 Next?                Supine T's -- Next? Next?                 STANDING:             " "        Pec stretch 10 x 10" 10 x 10" 10 x 10"  Low 10 x 10"  Low 10 x 10"   Low 10 x 10" 10 x 10"  Low 5 x 10"  Arms low 5 x 10"  Arms low 5 x 10"  Arms low 5 x 10"   Arms low 5 x 10"   Arms low  5 x 10"   Arms low  3 x 10" next next next   Single UE ER stretch doorway 5 x 10" B 5 x 10" B 5 x 10" B 5 x 10" B                Functional IR str with strap/towel 10 x 10" 10 x 10" 10 x 10" B 10 x 10" B 10 x 10" B 10 x 10" B 10 x 10" B 5 x 10" B 1 x 10 1 x 10 1 x 10 NT NT 3 x 10"      Row 3 x 10 BTB 3 x 10 BTB 3 x 10 BTB 3 x 10 BTB 3 x 10 BTB 3 x 10 GTB 3 x 10 GTB 3 x 10 GTB 3 x 10 GTB 2 x 10 GTB 2 x 10 RTB 2 x 10 RTB 2 x 10 RTB 2 x 10 next next next   I's  T's 3 x 10 BTB  1 x 10 RTB 3 x 10 BTB  1 x 10 RTB 3 x 10 BTB 3 x 10 BTB 3 x 10 BTB 3 x 10 GTB 2 x 10 GTB   2 x 10 RTB            Corazon's B GTB  2 x 15 GTB B GTB  2 x 15 ea B GTB  2 x 15 ea B GTB  2 x 10 ea B RTB  2 x 10 ea B RTB  2 x 10 ea  BUE RTB  2 x 10 ea NT NT NT Not today 1 x 10 ea RTB -- -- next next next   Wall walks -- -- NT NT NT -- NT (B) Flex/ABD   1 x 5 ea Flex  1 x 10 Flex  1 x 10 Flex/abd  1 x 10 Flex/ABD  1 x 10  Flex/ABD  1 x 10  Flex/ABD   1 x 5 ea 1 x 5      AROM (B)  - Flexion   - Abduction   - ABD w/thumb up  - Scaption    1 x 15 x 1#  1 x 10 x 1#  1 x 10 x 1#  1 x 15 x 1# (L)  1 x 15 x 2# (R   1 x 15 x 1#  1 x 10 x 1#  1 x 10 x 1#  1 x 15 x 1# (L)  1 x 15 x 2# (R) NT     1 x 15 x 1#  2 x 15  2 x 15  1 x 15 x 1#(L)  1 x 15 x 2# (R)   2 x 10 x 1#  2 x 10  Not today  2 x 10 x 1#   2 x 10 x 1#  Not today  Not today  Not today   2 x 10 x 1#  2 x 10  1 x 10  2 x 10 x 1#   2 x 10 x 1#  2 x 10    2 x 10 x 1#   1 x 20  1 x 20    1 x 15   1 x 20  1 x 10    1 x 10   1 x 10  1 x 10    1 x 10   1 x 10  1 x 10     1 x 10     1 x 5  1 x 5    1 x 5       D1 flexion -- Not today NT NT Not today Not today 1 x 15 B 1 x 15 B 1 x 15 1 x 10 1 x 10          OH Ball Toss -- Not today 20 x orange Next?                                    Initials CHERYLE HINES SB SB CHERYLE HINES SB SB " CHERYLE LOBATO DG BJ BJ SB DG DG MA        Passive Range of Motion:  Measured in supine  Shoulder Left Right   Flexion 145 140   Abduction 105 105   ER at 45 35 50   IR 70 70      Active Range of Motion: measured in standing  Shoulder Left Right   Flexion 130 125   Abduction 130 120   ER  Reach T1 Reach C7   IR Reach T9 Reach L3      Upper Extremity Strength    (L) UE (R) UE   Shoulder flexion: 4-/5 3+/5   Shoulder Abduction: 4-/5 3+/5   Shoulder ER 4-/5 4-/5   Shoulder IR 4/5 4/5   Middle Trap 4-/5 4-/5   Rhomboids 4-/5 4-/5            Home Exercises Provided and Patient Education Provided     Education provided:   - continued compliance with HEP    Written Home Exercises Provided: yes.  Exercises were reviewed and Christina was able to demonstrate them prior to the end of the session.  Christina demonstrated good  understanding of the education provided.     See EMR under Patient Instructions for exercises provided 6/26/2020.    Assessment     Christina is noted to have improved AROM/PROM and strength in bilateral shoulders as compared to evaluation.  She is reporting improved tolerance to ADL's and has been performing HEP consistently.  She has met all goals except LTG's # 1 and 4.  PT will be discharged from therapy to [facility] with goals partially met.       Christina is progressing well towards her goals.   Pt prognosis is Good.     Pt will continue to benefit from skilled outpatient physical therapy to address the deficits listed in the problem list box on initial evaluation, provide pt/family education and to maximize pt's level of independence in the home and community environment.     Pt's spiritual, cultural and educational needs considered and pt agreeable to plan of care and goals.     Anticipated barriers to physical therapy: none    Goals:   Short Term Goals:  4 weeks  1. Patient will be compliant with HEP to promote the independent management of current diagnosis. met  2. Patient will increase bilateral shoulder flexion to  110 degrees for function of grooming. met  3. Patient will increase bilateral shoulder functiona ER to reach C7.  met   4. Patient will report a decrease in complaints of bilateral shoulder pain to 5/10 during performance of ADL's for independence of self care activities.  met     Long Term Goals:  8 weeks  1. Patient will increase bilateral shoulder strength to 4/5 to improve tolerance to normal house work.. not met  2. Patient will increase bilateral shoulder flexion to 125 degrees in order to place dishes in overhead cabinets independently for self care independence.   met  3. Patient will increase bilateral shoulder functional IR to reach L3 for function of dressing. met  4. Patient will increase scapular stabilization strength to 4/5 to improve tolerance to normal lifting.  not met  5. Patient will improve FOTO limitation status from 47% to 36%  indicating increased functional mobility.  met    Plan     Discharge pt from PT to HEP.    Everette Redd, PT

## 2020-08-21 NOTE — PLAN OF CARE
Outpatient Therapy Discharge Summary     Name: Christina Tomlinson  Clinic Number: [id number]    Therapy Diagnosis:   Encounter Diagnoses   Name Primary?    Chronic pain of both shoulders     Decreased ROM of right shoulder     Decreased ROM of left shoulder     Muscle weakness of left arm     Muscle weakness of right arm      Physician: Everette Aguirre MD    Physician Orders: PT Eval and Treat   Medical Diagnosis from Referral:   M75.101 (ICD-10-CM) - Unspecified rotator cuff tear or rupture of right shoulder, not specified as traumatic   M12.811 (ICD-10-CM) - Other specific arthropathies, not elsewhere classified, right shoulder   M12.812 (ICD-10-CM) - Other specific arthropathies, not elsewhere classified, left shoulder   M75.102 (ICD-10-CM) - Unspecified rotator cuff tear or rupture of left shoulder, not specified as traumatic   Evaluation Date: 6/24/2020      Date of Last visit: 8/20/2020  Total Visits Received: 17  Cancelled Visits: 0  No Show Visits: 0    Assessment    Goals:   Short Term Goals:  4 weeks  1. Patient will be compliant with HEP to promote the independent management of current diagnosis. met  2. Patient will increase bilateral shoulder flexion to 110 degrees for function of grooming. met  3. Patient will increase bilateral shoulder functiona ER to reach C7.  met   4. Patient will report a decrease in complaints of bilateral shoulder pain to 5/10 during performance of ADL's for independence of self care activities.  met     Long Term Goals:  8 weeks  1. Patient will increase bilateral shoulder strength to 4/5 to improve tolerance to normal house work.. not met  2. Patient will increase bilateral shoulder flexion to 125 degrees in order to place dishes in overhead cabinets independently for self care independence.   met  3. Patient will increase bilateral shoulder functional IR to reach L3 for function of dressing. met  4. Patient will increase scapular stabilization strength to 4/5 to  improve tolerance to normal lifting.  not met  5. Patient will improve FOTO limitation status from 47% to 36%  indicating increased functional mobility.  met    Discharge reason: Other:  Pt will continue exercises at home with HEP    Plan   This patient is discharged from Physical Therapy    I agree with findings outlined

## 2020-09-17 NOTE — TELEPHONE ENCOUNTER
Last seen 2015.  She says all feeling good.  Working on diet now. Says took apple cidar vinegar and it gave her all kinds of trouble.  Has medicaid now.  [place].  Appreciated our call.  I told her we are always happy to see her when need.      
She was recently in hospital for pancreatitis..  She should come in   
PRINCIPAL DISCHARGE DIAGNOSIS  Diagnosis: CAD (coronary artery disease)  Assessment and Plan of Treatment: You have a diagnosis of coronary artery disease and  received a stent to your coronary artery. You have been started on Aspirin 81mg daily and Plavix (Clopidogrel) 75mg daily. You MUST continue taking the daily Aspirin and Plavix to ensure your stent does not close. DO NOT STOP THESE MEDICATIONS FOR ANY REASON UNLESS OTHERWISE INDICATED BY YOUR CARDIOLOGIST BECAUSE THIS WILL PUT YOU AT RISK FOR A HEART ATTACK. You should refrain from strenuous activity and heavy lifting for 1 week. Please make a follow up appointment with your cardiologist within 1-2 weeks of your discharge. All of your prescriptions have been sent electronically to your pharmacy.        SECONDARY DISCHARGE DIAGNOSES  Diagnosis: Hypothyroidism  Assessment and Plan of Treatment: Please continue taking Levothyroxine 125mcg daily.    Diagnosis: Hyperlipidemia  Assessment and Plan of Treatment: You have a history of high cholesterol. Your Atorvastatin 10mg was increased to 80mg daily, please continue to take daily to ensure your stent remains open and to prevent further heart disease.       Diagnosis: Hypertension  Assessment and Plan of Treatment: You have a history of elevated blood pressure and you should continue taking Metoprolol succinate 25mg daily.

## 2020-12-11 ENCOUNTER — PATIENT MESSAGE (OUTPATIENT)
Dept: OTHER | Facility: OTHER | Age: 65
End: 2020-12-11

## 2021-02-04 DIAGNOSIS — N18.32 ANEMIA DUE TO STAGE 3B CHRONIC KIDNEY DISEASE: ICD-10-CM

## 2021-02-04 DIAGNOSIS — N18.32 STAGE 3B CHRONIC KIDNEY DISEASE: Primary | ICD-10-CM

## 2021-02-04 DIAGNOSIS — N20.0 NEPHROLITHIASIS: ICD-10-CM

## 2021-02-04 DIAGNOSIS — I10 HTN (HYPERTENSION), BENIGN: ICD-10-CM

## 2021-02-04 DIAGNOSIS — D63.1 ANEMIA DUE TO STAGE 3B CHRONIC KIDNEY DISEASE: ICD-10-CM

## 2021-02-04 DIAGNOSIS — I42.9 CARDIOMYOPATHY, UNSPECIFIED TYPE: ICD-10-CM

## 2021-02-10 ENCOUNTER — TELEPHONE (OUTPATIENT)
Dept: CARDIOLOGY | Facility: CLINIC | Age: 66
End: 2021-02-10

## 2021-02-10 ENCOUNTER — OFFICE VISIT (OUTPATIENT)
Dept: CARDIOLOGY | Facility: CLINIC | Age: 66
End: 2021-02-10
Payer: MEDICARE

## 2021-02-10 VITALS
OXYGEN SATURATION: 98 % | HEART RATE: 107 BPM | WEIGHT: 222 LBS | SYSTOLIC BLOOD PRESSURE: 125 MMHG | BODY MASS INDEX: 38.11 KG/M2 | DIASTOLIC BLOOD PRESSURE: 62 MMHG

## 2021-02-10 DIAGNOSIS — E66.9 OBESITY (BMI 30-39.9): Chronic | ICD-10-CM

## 2021-02-10 DIAGNOSIS — I11.0 CARDIOMYOPATHY DUE TO HYPERTENSION, WITH HEART FAILURE: ICD-10-CM

## 2021-02-10 DIAGNOSIS — R00.0 TACHYCARDIA: ICD-10-CM

## 2021-02-10 DIAGNOSIS — I34.0 MODERATE MITRAL INSUFFICIENCY: ICD-10-CM

## 2021-02-10 DIAGNOSIS — I43 CARDIOMYOPATHY DUE TO HYPERTENSION, WITH HEART FAILURE: ICD-10-CM

## 2021-02-10 DIAGNOSIS — I25.119 CORONARY ARTERY DISEASE INVOLVING NATIVE CORONARY ARTERY OF NATIVE HEART WITH ANGINA PECTORIS: ICD-10-CM

## 2021-02-10 DIAGNOSIS — I10 ESSENTIAL HYPERTENSION: Primary | ICD-10-CM

## 2021-02-10 DIAGNOSIS — I42.9 CARDIOMYOPATHY, UNSPECIFIED TYPE: ICD-10-CM

## 2021-02-10 PROCEDURE — 1126F PR PAIN SEVERITY QUANTIFIED, NO PAIN PRESENT: ICD-10-PCS | Mod: S$GLB,,, | Performed by: INTERNAL MEDICINE

## 2021-02-10 PROCEDURE — 99214 PR OFFICE/OUTPT VISIT, EST, LEVL IV, 30-39 MIN: ICD-10-PCS | Mod: S$GLB,,, | Performed by: INTERNAL MEDICINE

## 2021-02-10 PROCEDURE — 99499 RISK ADDL DX/OHS AUDIT: ICD-10-PCS | Mod: S$GLB,,, | Performed by: INTERNAL MEDICINE

## 2021-02-10 PROCEDURE — 3288F PR FALLS RISK ASSESSMENT DOCUMENTED: ICD-10-PCS | Mod: CPTII,S$GLB,, | Performed by: INTERNAL MEDICINE

## 2021-02-10 PROCEDURE — 1101F PR PT FALLS ASSESS DOC 0-1 FALLS W/OUT INJ PAST YR: ICD-10-PCS | Mod: CPTII,S$GLB,, | Performed by: INTERNAL MEDICINE

## 2021-02-10 PROCEDURE — 1101F PT FALLS ASSESS-DOCD LE1/YR: CPT | Mod: CPTII,S$GLB,, | Performed by: INTERNAL MEDICINE

## 2021-02-10 PROCEDURE — 99214 OFFICE O/P EST MOD 30 MIN: CPT | Mod: S$GLB,,, | Performed by: INTERNAL MEDICINE

## 2021-02-10 PROCEDURE — 3008F BODY MASS INDEX DOCD: CPT | Mod: CPTII,S$GLB,, | Performed by: INTERNAL MEDICINE

## 2021-02-10 PROCEDURE — 3078F PR MOST RECENT DIASTOLIC BLOOD PRESSURE < 80 MM HG: ICD-10-PCS | Mod: CPTII,S$GLB,, | Performed by: INTERNAL MEDICINE

## 2021-02-10 PROCEDURE — 3074F SYST BP LT 130 MM HG: CPT | Mod: CPTII,S$GLB,, | Performed by: INTERNAL MEDICINE

## 2021-02-10 PROCEDURE — 3008F PR BODY MASS INDEX (BMI) DOCUMENTED: ICD-10-PCS | Mod: CPTII,S$GLB,, | Performed by: INTERNAL MEDICINE

## 2021-02-10 PROCEDURE — 1159F MED LIST DOCD IN RCRD: CPT | Mod: S$GLB,,, | Performed by: INTERNAL MEDICINE

## 2021-02-10 PROCEDURE — 1159F PR MEDICATION LIST DOCUMENTED IN MEDICAL RECORD: ICD-10-PCS | Mod: S$GLB,,, | Performed by: INTERNAL MEDICINE

## 2021-02-10 PROCEDURE — 3288F FALL RISK ASSESSMENT DOCD: CPT | Mod: CPTII,S$GLB,, | Performed by: INTERNAL MEDICINE

## 2021-02-10 PROCEDURE — 3078F DIAST BP <80 MM HG: CPT | Mod: CPTII,S$GLB,, | Performed by: INTERNAL MEDICINE

## 2021-02-10 PROCEDURE — 99499 UNLISTED E&M SERVICE: CPT | Mod: S$GLB,,, | Performed by: INTERNAL MEDICINE

## 2021-02-10 PROCEDURE — 3074F PR MOST RECENT SYSTOLIC BLOOD PRESSURE < 130 MM HG: ICD-10-PCS | Mod: CPTII,S$GLB,, | Performed by: INTERNAL MEDICINE

## 2021-02-10 PROCEDURE — 1126F AMNT PAIN NOTED NONE PRSNT: CPT | Mod: S$GLB,,, | Performed by: INTERNAL MEDICINE

## 2021-02-10 RX ORDER — PREDNISONE 2.5 MG/1
2.5 TABLET ORAL DAILY
COMMUNITY
End: 2022-03-30

## 2021-02-10 RX ORDER — METOPROLOL SUCCINATE 50 MG/1
TABLET, EXTENDED RELEASE ORAL
Qty: 90 TABLET | Refills: 3 | Status: SHIPPED | OUTPATIENT
Start: 2021-02-10 | End: 2021-07-21 | Stop reason: SDUPTHER

## 2021-02-10 RX ORDER — LEFLUNOMIDE 10 MG/1
20 TABLET ORAL DAILY
COMMUNITY

## 2021-02-11 ENCOUNTER — HOSPITAL ENCOUNTER (OUTPATIENT)
Dept: CARDIOLOGY | Facility: HOSPITAL | Age: 66
Discharge: HOME | End: 2021-02-11
Attending: INTERNAL MEDICINE
Payer: MEDICARE

## 2021-02-11 ENCOUNTER — OFFICE VISIT (OUTPATIENT)
Dept: NEPHROLOGY | Facility: CLINIC | Age: 66
End: 2021-02-11
Payer: MEDICARE

## 2021-02-11 VITALS — HEIGHT: 64 IN | WEIGHT: 222 LBS | BODY MASS INDEX: 37.9 KG/M2

## 2021-02-11 VITALS
SYSTOLIC BLOOD PRESSURE: 124 MMHG | WEIGHT: 222.88 LBS | DIASTOLIC BLOOD PRESSURE: 74 MMHG | HEART RATE: 112 BPM | HEIGHT: 64 IN | BODY MASS INDEX: 38.05 KG/M2

## 2021-02-11 DIAGNOSIS — I10 HTN (HYPERTENSION), BENIGN: ICD-10-CM

## 2021-02-11 DIAGNOSIS — I43 CARDIOMYOPATHY DUE TO HYPERTENSION, WITH HEART FAILURE: ICD-10-CM

## 2021-02-11 DIAGNOSIS — I11.0 CARDIOMYOPATHY DUE TO HYPERTENSION, WITH HEART FAILURE: ICD-10-CM

## 2021-02-11 DIAGNOSIS — N18.31 ACUTE RENAL FAILURE SUPERIMPOSED ON STAGE 3A CHRONIC KIDNEY DISEASE, UNSPECIFIED ACUTE RENAL FAILURE TYPE: Primary | ICD-10-CM

## 2021-02-11 DIAGNOSIS — M19.90 ARTHRITIS: ICD-10-CM

## 2021-02-11 DIAGNOSIS — N17.9 ACUTE RENAL FAILURE SUPERIMPOSED ON STAGE 3A CHRONIC KIDNEY DISEASE, UNSPECIFIED ACUTE RENAL FAILURE TYPE: Primary | ICD-10-CM

## 2021-02-11 DIAGNOSIS — I25.10 CORONARY ARTERY DISEASE INVOLVING NATIVE HEART WITHOUT ANGINA PECTORIS, UNSPECIFIED VESSEL OR LESION TYPE: ICD-10-CM

## 2021-02-11 DIAGNOSIS — R00.0 TACHYCARDIA: ICD-10-CM

## 2021-02-11 LAB
AORTIC ROOT ANNULUS: 2.36 CM
AORTIC VALVE CUSP SEPERATION: 1.56 CM
AV INDEX (PROSTH): 0.84
AV MEAN GRADIENT: 4 MMHG
AV PEAK GRADIENT: 6 MMHG
AV VALVE AREA: 2.98 CM2
AV VELOCITY RATIO: 0.76
BSA FOR ECHO PROCEDURE: 2.13 M2
CV ECHO LV RWT: 0.55 CM
DOP CALC AO PEAK VEL: 1.25 M/S
DOP CALC AO VTI: 23.76 CM
DOP CALC LVOT AREA: 3.5 CM2
DOP CALC LVOT DIAMETER: 2.12 CM
DOP CALC LVOT PEAK VEL: 0.95 M/S
DOP CALC LVOT STROKE VOLUME: 70.7 CM3
DOP CALCLVOT PEAK VEL VTI: 20.04 CM
E WAVE DECELERATION TIME: 113.94 MSEC
E/A RATIO: 0.73
ECHO LV POSTERIOR WALL: 1.12 CM (ref 0.6–1.1)
FRACTIONAL SHORTENING: 25 % (ref 28–44)
INTERVENTRICULAR SEPTUM: 1.23 CM (ref 0.6–1.1)
IVC PROX: 1.7 CM
LA MAJOR: 3.65 CM
LA MINOR: 4 CM
LA WIDTH: 2.92 CM
LEFT ATRIUM SIZE: 3.26 CM
LEFT ATRIUM VOLUME INDEX MOD: 8.1 ML/M2
LEFT ATRIUM VOLUME INDEX: 15.1 ML/M2
LEFT ATRIUM VOLUME MOD: 16.52 CM3
LEFT ATRIUM VOLUME: 30.88 CM3
LEFT INTERNAL DIMENSION IN SYSTOLE: 3.05 CM (ref 2.1–4)
LEFT VENTRICLE DIASTOLIC VOLUME INDEX: 35.13 ML/M2
LEFT VENTRICLE DIASTOLIC VOLUME: 71.66 ML
LEFT VENTRICLE MASS INDEX: 80 G/M2
LEFT VENTRICLE SYSTOLIC VOLUME INDEX: 17.8 ML/M2
LEFT VENTRICLE SYSTOLIC VOLUME: 36.35 ML
LEFT VENTRICULAR INTERNAL DIMENSION IN DIASTOLE: 4.04 CM (ref 3.5–6)
LEFT VENTRICULAR MASS: 162.82 G
MV PEAK A VEL: 1.1 M/S
MV PEAK E VEL: 0.8 M/S
PISA MRMAX VEL: 0.06 M/S
PISA TR MAX VEL: 2.41 M/S
PULM VEIN S/D RATIO: 1.4
PV PEAK D VEL: 0.3 M/S
PV PEAK S VEL: 0.42 M/S
PV PEAK VELOCITY: 0.73 CM/S
RA MAJOR: 3.41 CM
RA PRESSURE: 3 MMHG
RA WIDTH: 2.61 CM
RIGHT VENTRICULAR END-DIASTOLIC DIMENSION: 2.16 CM
RV TISSUE DOPPLER FREE WALL SYSTOLIC VELOCITY 1 (APICAL 4 CHAMBER VIEW): 5.65 CM/S
TR MAX PG: 23 MMHG
TRICUSPID ANNULAR PLANE SYSTOLIC EXCURSION: 1.37 CM
TV REST PULMONARY ARTERY PRESSURE: 26 MMHG

## 2021-02-11 PROCEDURE — 99999 PR PBB SHADOW E&M-EST. PATIENT-LVL III: CPT | Mod: PBBFAC,,, | Performed by: INTERNAL MEDICINE

## 2021-02-11 PROCEDURE — 93227 HOLTER MONITOR - 24 HOUR (CUPID ONLY): ICD-10-PCS | Mod: ,,, | Performed by: INTERNAL MEDICINE

## 2021-02-11 PROCEDURE — 1101F PT FALLS ASSESS-DOCD LE1/YR: CPT | Mod: CPTII,S$GLB,, | Performed by: INTERNAL MEDICINE

## 2021-02-11 PROCEDURE — 1159F MED LIST DOCD IN RCRD: CPT | Mod: S$GLB,,, | Performed by: INTERNAL MEDICINE

## 2021-02-11 PROCEDURE — 93226 XTRNL ECG REC<48 HR SCAN A/R: CPT | Mod: PO

## 2021-02-11 PROCEDURE — 3074F PR MOST RECENT SYSTOLIC BLOOD PRESSURE < 130 MM HG: ICD-10-PCS | Mod: CPTII,S$GLB,, | Performed by: INTERNAL MEDICINE

## 2021-02-11 PROCEDURE — 3288F PR FALLS RISK ASSESSMENT DOCUMENTED: ICD-10-PCS | Mod: CPTII,S$GLB,, | Performed by: INTERNAL MEDICINE

## 2021-02-11 PROCEDURE — 3074F SYST BP LT 130 MM HG: CPT | Mod: CPTII,S$GLB,, | Performed by: INTERNAL MEDICINE

## 2021-02-11 PROCEDURE — 99214 PR OFFICE/OUTPT VISIT, EST, LEVL IV, 30-39 MIN: ICD-10-PCS | Mod: S$GLB,,, | Performed by: INTERNAL MEDICINE

## 2021-02-11 PROCEDURE — 1159F PR MEDICATION LIST DOCUMENTED IN MEDICAL RECORD: ICD-10-PCS | Mod: S$GLB,,, | Performed by: INTERNAL MEDICINE

## 2021-02-11 PROCEDURE — 1126F AMNT PAIN NOTED NONE PRSNT: CPT | Mod: S$GLB,,, | Performed by: INTERNAL MEDICINE

## 2021-02-11 PROCEDURE — 93306 TTE W/DOPPLER COMPLETE: CPT | Mod: 26,,, | Performed by: INTERNAL MEDICINE

## 2021-02-11 PROCEDURE — 93306 ECHO (CUPID ONLY): ICD-10-PCS | Mod: 26,,, | Performed by: INTERNAL MEDICINE

## 2021-02-11 PROCEDURE — 3288F FALL RISK ASSESSMENT DOCD: CPT | Mod: CPTII,S$GLB,, | Performed by: INTERNAL MEDICINE

## 2021-02-11 PROCEDURE — 99214 OFFICE O/P EST MOD 30 MIN: CPT | Mod: S$GLB,,, | Performed by: INTERNAL MEDICINE

## 2021-02-11 PROCEDURE — 3008F BODY MASS INDEX DOCD: CPT | Mod: CPTII,S$GLB,, | Performed by: INTERNAL MEDICINE

## 2021-02-11 PROCEDURE — 3078F PR MOST RECENT DIASTOLIC BLOOD PRESSURE < 80 MM HG: ICD-10-PCS | Mod: CPTII,S$GLB,, | Performed by: INTERNAL MEDICINE

## 2021-02-11 PROCEDURE — 3078F DIAST BP <80 MM HG: CPT | Mod: CPTII,S$GLB,, | Performed by: INTERNAL MEDICINE

## 2021-02-11 PROCEDURE — 3008F PR BODY MASS INDEX (BMI) DOCUMENTED: ICD-10-PCS | Mod: CPTII,S$GLB,, | Performed by: INTERNAL MEDICINE

## 2021-02-11 PROCEDURE — 99999 PR PBB SHADOW E&M-EST. PATIENT-LVL III: ICD-10-PCS | Mod: PBBFAC,,, | Performed by: INTERNAL MEDICINE

## 2021-02-11 PROCEDURE — 1101F PR PT FALLS ASSESS DOC 0-1 FALLS W/OUT INJ PAST YR: ICD-10-PCS | Mod: CPTII,S$GLB,, | Performed by: INTERNAL MEDICINE

## 2021-02-11 PROCEDURE — 1126F PR PAIN SEVERITY QUANTIFIED, NO PAIN PRESENT: ICD-10-PCS | Mod: S$GLB,,, | Performed by: INTERNAL MEDICINE

## 2021-02-11 PROCEDURE — 93306 TTE W/DOPPLER COMPLETE: CPT | Mod: PO

## 2021-02-11 PROCEDURE — 93227 XTRNL ECG REC<48 HR R&I: CPT | Mod: ,,, | Performed by: INTERNAL MEDICINE

## 2021-02-15 DIAGNOSIS — M17.11 PRIMARY OSTEOARTHRITIS OF RIGHT KNEE: Primary | ICD-10-CM

## 2021-02-15 LAB
AORTIC ROOT ANNULUS: 2.36 CM
AORTIC VALVE CUSP SEPERATION: 1.56 CM
AV INDEX (PROSTH): 0.84
AV MEAN GRADIENT: 4 MMHG
AV PEAK GRADIENT: 6 MMHG
AV VALVE AREA: 2.98 CM2
AV VELOCITY RATIO: 0.76
CV ECHO LV RWT: 0.55 CM
DOP CALC AO PEAK VEL: 1.25 M/S
DOP CALC AO VTI: 23.76 CM
DOP CALC LVOT AREA: 3.5 CM2
DOP CALC LVOT DIAMETER: 2.12 CM
DOP CALC LVOT PEAK VEL: 0.95 M/S
DOP CALC LVOT STROKE VOLUME: 70.7 CM3
DOP CALCLVOT PEAK VEL VTI: 20.04 CM
E WAVE DECELERATION TIME: 113.94 MSEC
E/A RATIO: 0.73
ECHO LV POSTERIOR WALL: 1.12 CM (ref 0.6–1.1)
FRACTIONAL SHORTENING: 25 % (ref 28–44)
INTERVENTRICULAR SEPTUM: 1.23 CM (ref 0.6–1.1)
LA MAJOR: 3.65 CM
LA MINOR: 4 CM
LA WIDTH: 2.92 CM
LEFT ATRIUM SIZE: 3.26 CM
LEFT ATRIUM VOLUME MOD: 16.52 CM3
LEFT ATRIUM VOLUME: 30.88 CM3
LEFT INTERNAL DIMENSION IN SYSTOLE: 3.05 CM (ref 2.1–4)
LEFT VENTRICLE DIASTOLIC VOLUME: 71.66 ML
LEFT VENTRICLE SYSTOLIC VOLUME: 36.35 ML
LEFT VENTRICULAR INTERNAL DIMENSION IN DIASTOLE: 4.04 CM (ref 3.5–6)
LEFT VENTRICULAR MASS: 162.82 G
MV PEAK A VEL: 1.1 M/S
MV PEAK E VEL: 0.8 M/S
OHS CV EVENT MONITOR DAY: 0
OHS CV HOLTER LENGTH DECIMAL HOURS: 24
OHS CV HOLTER LENGTH HOURS: 24
OHS CV HOLTER LENGTH MINUTES: 0
PISA MRMAX VEL: 0.06 M/S
PISA TR MAX VEL: 2.41 M/S
PULM VEIN S/D RATIO: 1.4
PV PEAK D VEL: 0.3 M/S
PV PEAK S VEL: 0.42 M/S
PV PEAK VELOCITY: 0.73 CM/S
RA MAJOR: 3.41 CM
RA WIDTH: 2.61 CM
RIGHT VENTRICULAR END-DIASTOLIC DIMENSION: 2.16 CM
RV TISSUE DOPPLER FREE WALL SYSTOLIC VELOCITY 1 (APICAL 4 CHAMBER VIEW): 5.65 CM/S
TR MAX PG: 23 MMHG
TRICUSPID ANNULAR PLANE SYSTOLIC EXCURSION: 1.37 CM

## 2021-02-22 ENCOUNTER — TELEPHONE (OUTPATIENT)
Dept: CARDIOLOGY | Facility: CLINIC | Age: 66
End: 2021-02-22

## 2021-02-23 ENCOUNTER — HOSPITAL ENCOUNTER (OUTPATIENT)
Dept: RADIOLOGY | Facility: HOSPITAL | Age: 66
Discharge: HOME | End: 2021-02-23
Attending: ORTHOPAEDIC SURGERY
Payer: MEDICARE

## 2021-02-23 ENCOUNTER — OFFICE VISIT (OUTPATIENT)
Dept: ORTHOPEDICS | Facility: CLINIC | Age: 66
End: 2021-02-23
Payer: MEDICARE

## 2021-02-23 VITALS
HEART RATE: 106 BPM | WEIGHT: 220.25 LBS | HEIGHT: 64 IN | BODY MASS INDEX: 37.6 KG/M2 | SYSTOLIC BLOOD PRESSURE: 142 MMHG | DIASTOLIC BLOOD PRESSURE: 81 MMHG

## 2021-02-23 DIAGNOSIS — M17.11 PRIMARY OSTEOARTHRITIS OF RIGHT KNEE: ICD-10-CM

## 2021-02-23 DIAGNOSIS — Z96.651 AFTERCARE FOLLOWING RIGHT KNEE JOINT REPLACEMENT SURGERY: Primary | ICD-10-CM

## 2021-02-23 DIAGNOSIS — Z47.1 AFTERCARE FOLLOWING RIGHT KNEE JOINT REPLACEMENT SURGERY: Primary | ICD-10-CM

## 2021-02-23 PROCEDURE — 99212 OFFICE O/P EST SF 10 MIN: CPT | Mod: S$GLB,,, | Performed by: ORTHOPAEDIC SURGERY

## 2021-02-23 PROCEDURE — 3077F SYST BP >= 140 MM HG: CPT | Mod: CPTII,S$GLB,, | Performed by: ORTHOPAEDIC SURGERY

## 2021-02-23 PROCEDURE — 1126F AMNT PAIN NOTED NONE PRSNT: CPT | Mod: S$GLB,,, | Performed by: ORTHOPAEDIC SURGERY

## 2021-02-23 PROCEDURE — 1159F PR MEDICATION LIST DOCUMENTED IN MEDICAL RECORD: ICD-10-PCS | Mod: S$GLB,,, | Performed by: ORTHOPAEDIC SURGERY

## 2021-02-23 PROCEDURE — 3008F PR BODY MASS INDEX (BMI) DOCUMENTED: ICD-10-PCS | Mod: CPTII,S$GLB,, | Performed by: ORTHOPAEDIC SURGERY

## 2021-02-23 PROCEDURE — 73562 X-RAY EXAM OF KNEE 3: CPT | Mod: 26,RT,, | Performed by: RADIOLOGY

## 2021-02-23 PROCEDURE — 99999 PR PBB SHADOW E&M-EST. PATIENT-LVL IV: ICD-10-PCS | Mod: PBBFAC,,, | Performed by: ORTHOPAEDIC SURGERY

## 2021-02-23 PROCEDURE — 77073 BONE LENGTH STUDIES: CPT | Mod: TC,FY

## 2021-02-23 PROCEDURE — 3288F FALL RISK ASSESSMENT DOCD: CPT | Mod: CPTII,S$GLB,, | Performed by: ORTHOPAEDIC SURGERY

## 2021-02-23 PROCEDURE — 73562 X-RAY EXAM OF KNEE 3: CPT | Mod: TC,FY,RT

## 2021-02-23 PROCEDURE — 99212 PR OFFICE/OUTPT VISIT, EST, LEVL II, 10-19 MIN: ICD-10-PCS | Mod: S$GLB,,, | Performed by: ORTHOPAEDIC SURGERY

## 2021-02-23 PROCEDURE — 3079F PR MOST RECENT DIASTOLIC BLOOD PRESSURE 80-89 MM HG: ICD-10-PCS | Mod: CPTII,S$GLB,, | Performed by: ORTHOPAEDIC SURGERY

## 2021-02-23 PROCEDURE — 3008F BODY MASS INDEX DOCD: CPT | Mod: CPTII,S$GLB,, | Performed by: ORTHOPAEDIC SURGERY

## 2021-02-23 PROCEDURE — 1126F PR PAIN SEVERITY QUANTIFIED, NO PAIN PRESENT: ICD-10-PCS | Mod: S$GLB,,, | Performed by: ORTHOPAEDIC SURGERY

## 2021-02-23 PROCEDURE — 77073 BONE LENGTH STUDIES: CPT | Mod: 26,,, | Performed by: RADIOLOGY

## 2021-02-23 PROCEDURE — 77073 XR HIP TO ANKLE: ICD-10-PCS | Mod: 26,,, | Performed by: RADIOLOGY

## 2021-02-23 PROCEDURE — 3288F PR FALLS RISK ASSESSMENT DOCUMENTED: ICD-10-PCS | Mod: CPTII,S$GLB,, | Performed by: ORTHOPAEDIC SURGERY

## 2021-02-23 PROCEDURE — 3077F PR MOST RECENT SYSTOLIC BLOOD PRESSURE >= 140 MM HG: ICD-10-PCS | Mod: CPTII,S$GLB,, | Performed by: ORTHOPAEDIC SURGERY

## 2021-02-23 PROCEDURE — 99999 PR PBB SHADOW E&M-EST. PATIENT-LVL IV: CPT | Mod: PBBFAC,,, | Performed by: ORTHOPAEDIC SURGERY

## 2021-02-23 PROCEDURE — 73562 XR KNEE 3 VIEW RIGHT: ICD-10-PCS | Mod: 26,RT,, | Performed by: RADIOLOGY

## 2021-02-23 PROCEDURE — 1159F MED LIST DOCD IN RCRD: CPT | Mod: S$GLB,,, | Performed by: ORTHOPAEDIC SURGERY

## 2021-02-23 PROCEDURE — 3079F DIAST BP 80-89 MM HG: CPT | Mod: CPTII,S$GLB,, | Performed by: ORTHOPAEDIC SURGERY

## 2021-02-23 PROCEDURE — 1101F PR PT FALLS ASSESS DOC 0-1 FALLS W/OUT INJ PAST YR: ICD-10-PCS | Mod: CPTII,S$GLB,, | Performed by: ORTHOPAEDIC SURGERY

## 2021-02-23 PROCEDURE — 1101F PT FALLS ASSESS-DOCD LE1/YR: CPT | Mod: CPTII,S$GLB,, | Performed by: ORTHOPAEDIC SURGERY

## 2021-07-06 ENCOUNTER — TELEPHONE (OUTPATIENT)
Dept: ORTHOPEDICS | Facility: CLINIC | Age: 66
End: 2021-07-06

## 2021-07-07 ENCOUNTER — TELEPHONE (OUTPATIENT)
Dept: NEUROLOGY | Facility: CLINIC | Age: 66
End: 2021-07-07

## 2021-07-07 DIAGNOSIS — I10 ESSENTIAL HYPERTENSION: Primary | ICD-10-CM

## 2021-07-07 DIAGNOSIS — N17.9 AKI (ACUTE KIDNEY INJURY): ICD-10-CM

## 2021-07-10 LAB
ALBUMIN SERPL-MCNC: 3.6 G/DL (ref 3.6–5.1)
ALBUMIN/CREAT UR: 4 MCG/MG CREAT
ALBUMIN/GLOB SERPL: 1.2 (CALC) (ref 1–2.5)
ALP SERPL-CCNC: 61 U/L (ref 37–153)
ALT SERPL-CCNC: 19 U/L (ref 6–29)
APPEARANCE UR: CLEAR
AST SERPL-CCNC: 28 U/L (ref 10–35)
BACTERIA #/AREA URNS HPF: ABNORMAL /HPF
BACTERIA UR CULT: ABNORMAL
BASOPHILS # BLD AUTO: 79 CELLS/UL (ref 0–200)
BASOPHILS NFR BLD AUTO: 1.3 %
BILIRUB SERPL-MCNC: 0.4 MG/DL (ref 0.2–1.2)
BILIRUB UR QL STRIP: NEGATIVE
BUN SERPL-MCNC: 13 MG/DL (ref 7–25)
BUN/CREAT SERPL: 12 (CALC) (ref 6–22)
CALCIUM SERPL-MCNC: 9.1 MG/DL (ref 8.6–10.4)
CHLORIDE SERPL-SCNC: 102 MMOL/L (ref 98–110)
CHOLEST SERPL-MCNC: 139 MG/DL
CHOLEST/HDLC SERPL: 3 (CALC)
CO2 SERPL-SCNC: 31 MMOL/L (ref 20–32)
COLOR UR: YELLOW
CREAT SERPL-MCNC: 1.05 MG/DL (ref 0.5–0.99)
CREAT UR-MCNC: 129 MG/DL (ref 20–275)
EOSINOPHIL # BLD AUTO: 610 CELLS/UL (ref 15–500)
EOSINOPHIL NFR BLD AUTO: 10 %
ERYTHROCYTE [DISTWIDTH] IN BLOOD BY AUTOMATED COUNT: 13.9 % (ref 11–15)
GLOBULIN SER CALC-MCNC: 3 G/DL (CALC) (ref 1.9–3.7)
GLUCOSE SERPL-MCNC: 90 MG/DL (ref 65–99)
GLUCOSE UR QL STRIP: NEGATIVE
HCT VFR BLD AUTO: 32.1 % (ref 35–45)
HDLC SERPL-MCNC: 46 MG/DL
HGB BLD-MCNC: 10.4 G/DL (ref 11.7–15.5)
HGB UR QL STRIP: NEGATIVE
HYALINE CASTS #/AREA URNS LPF: ABNORMAL /LPF
KETONES UR QL STRIP: NEGATIVE
LDLC SERPL CALC-MCNC: 71 MG/DL (CALC)
LEUKOCYTE ESTERASE UR QL STRIP: NEGATIVE
LYMPHOCYTES # BLD AUTO: 2202 CELLS/UL (ref 850–3900)
LYMPHOCYTES NFR BLD AUTO: 36.1 %
MAGNESIUM SERPL-MCNC: 1.6 MG/DL (ref 1.5–2.5)
MCH RBC QN AUTO: 29.2 PG (ref 27–33)
MCHC RBC AUTO-ENTMCNC: 32.4 G/DL (ref 32–36)
MCV RBC AUTO: 90.2 FL (ref 80–100)
MICROALBUMIN UR-MCNC: 0.5 MG/DL
MONOCYTES # BLD AUTO: 519 CELLS/UL (ref 200–950)
MONOCYTES NFR BLD AUTO: 8.5 %
NEUTROPHILS # BLD AUTO: 2690 CELLS/UL (ref 1500–7800)
NEUTROPHILS NFR BLD AUTO: 44.1 %
NITRITE UR QL STRIP: NEGATIVE
NONHDLC SERPL-MCNC: 93 MG/DL (CALC)
PH UR STRIP: 5.5 [PH] (ref 5–8)
PHOSPHATE SERPL-MCNC: 3.4 MG/DL (ref 2.1–4.3)
PLATELET # BLD AUTO: 219 THOUSAND/UL (ref 140–400)
PMV BLD REES-ECKER: 10.1 FL (ref 7.5–12.5)
POTASSIUM SERPL-SCNC: 3.4 MMOL/L (ref 3.5–5.3)
PROT SERPL-MCNC: 6.6 G/DL (ref 6.1–8.1)
PROT UR QL STRIP: NEGATIVE
RBC # BLD AUTO: 3.56 MILLION/UL (ref 3.8–5.1)
RBC #/AREA URNS HPF: ABNORMAL /HPF
SERVICE CMNT-IMP: ABNORMAL
SODIUM SERPL-SCNC: 142 MMOL/L (ref 135–146)
SP GR UR STRIP: 1.01 (ref 1–1.03)
SQUAMOUS #/AREA URNS HPF: ABNORMAL /HPF
TRIGL SERPL-MCNC: 134 MG/DL
TSH SERPL-ACNC: 2.18 MIU/L (ref 0.4–4.5)
URATE SERPL-MCNC: 5.2 MG/DL (ref 2.5–7)
WBC # BLD AUTO: 6.1 THOUSAND/UL (ref 3.8–10.8)
WBC #/AREA URNS HPF: ABNORMAL /HPF

## 2021-07-15 ENCOUNTER — OFFICE VISIT (OUTPATIENT)
Dept: NEPHROLOGY | Facility: CLINIC | Age: 66
End: 2021-07-15
Payer: MEDICARE

## 2021-07-15 ENCOUNTER — TELEPHONE (OUTPATIENT)
Dept: ADMINISTRATIVE | Facility: OTHER | Age: 66
End: 2021-07-15

## 2021-07-15 VITALS
BODY MASS INDEX: 35.51 KG/M2 | WEIGHT: 208 LBS | DIASTOLIC BLOOD PRESSURE: 63 MMHG | HEIGHT: 64 IN | HEART RATE: 106 BPM | SYSTOLIC BLOOD PRESSURE: 116 MMHG

## 2021-07-15 DIAGNOSIS — E78.2 MIXED HYPERLIPIDEMIA: ICD-10-CM

## 2021-07-15 DIAGNOSIS — N18.31 STAGE 3A CHRONIC KIDNEY DISEASE: ICD-10-CM

## 2021-07-15 DIAGNOSIS — I10 HYPERTENSION, UNSPECIFIED TYPE: ICD-10-CM

## 2021-07-15 DIAGNOSIS — M06.9 RHEUMATOID ARTHRITIS INVOLVING BOTH HANDS, UNSPECIFIED WHETHER RHEUMATOID FACTOR PRESENT: ICD-10-CM

## 2021-07-15 DIAGNOSIS — N18.4 CKD (CHRONIC KIDNEY DISEASE) STAGE 4, GFR 15-29 ML/MIN: Primary | ICD-10-CM

## 2021-07-15 PROCEDURE — 3074F PR MOST RECENT SYSTOLIC BLOOD PRESSURE < 130 MM HG: ICD-10-PCS | Mod: CPTII,S$GLB,, | Performed by: INTERNAL MEDICINE

## 2021-07-15 PROCEDURE — 99999 PR PBB SHADOW E&M-EST. PATIENT-LVL III: ICD-10-PCS | Mod: PBBFAC,,, | Performed by: INTERNAL MEDICINE

## 2021-07-15 PROCEDURE — 1126F AMNT PAIN NOTED NONE PRSNT: CPT | Mod: S$GLB,,, | Performed by: INTERNAL MEDICINE

## 2021-07-15 PROCEDURE — 3288F PR FALLS RISK ASSESSMENT DOCUMENTED: ICD-10-PCS | Mod: CPTII,S$GLB,, | Performed by: INTERNAL MEDICINE

## 2021-07-15 PROCEDURE — 1101F PT FALLS ASSESS-DOCD LE1/YR: CPT | Mod: CPTII,S$GLB,, | Performed by: INTERNAL MEDICINE

## 2021-07-15 PROCEDURE — 3074F SYST BP LT 130 MM HG: CPT | Mod: CPTII,S$GLB,, | Performed by: INTERNAL MEDICINE

## 2021-07-15 PROCEDURE — 3008F PR BODY MASS INDEX (BMI) DOCUMENTED: ICD-10-PCS | Mod: CPTII,S$GLB,, | Performed by: INTERNAL MEDICINE

## 2021-07-15 PROCEDURE — 1126F PR PAIN SEVERITY QUANTIFIED, NO PAIN PRESENT: ICD-10-PCS | Mod: S$GLB,,, | Performed by: INTERNAL MEDICINE

## 2021-07-15 PROCEDURE — 99214 PR OFFICE/OUTPT VISIT, EST, LEVL IV, 30-39 MIN: ICD-10-PCS | Mod: S$GLB,,, | Performed by: INTERNAL MEDICINE

## 2021-07-15 PROCEDURE — 1159F MED LIST DOCD IN RCRD: CPT | Mod: S$GLB,,, | Performed by: INTERNAL MEDICINE

## 2021-07-15 PROCEDURE — 3288F FALL RISK ASSESSMENT DOCD: CPT | Mod: CPTII,S$GLB,, | Performed by: INTERNAL MEDICINE

## 2021-07-15 PROCEDURE — 3078F DIAST BP <80 MM HG: CPT | Mod: CPTII,S$GLB,, | Performed by: INTERNAL MEDICINE

## 2021-07-15 PROCEDURE — 99214 OFFICE O/P EST MOD 30 MIN: CPT | Mod: S$GLB,,, | Performed by: INTERNAL MEDICINE

## 2021-07-15 PROCEDURE — 3078F PR MOST RECENT DIASTOLIC BLOOD PRESSURE < 80 MM HG: ICD-10-PCS | Mod: CPTII,S$GLB,, | Performed by: INTERNAL MEDICINE

## 2021-07-15 PROCEDURE — 1159F PR MEDICATION LIST DOCUMENTED IN MEDICAL RECORD: ICD-10-PCS | Mod: S$GLB,,, | Performed by: INTERNAL MEDICINE

## 2021-07-15 PROCEDURE — 3008F BODY MASS INDEX DOCD: CPT | Mod: CPTII,S$GLB,, | Performed by: INTERNAL MEDICINE

## 2021-07-15 PROCEDURE — 99999 PR PBB SHADOW E&M-EST. PATIENT-LVL III: CPT | Mod: PBBFAC,,, | Performed by: INTERNAL MEDICINE

## 2021-07-15 PROCEDURE — 1101F PR PT FALLS ASSESS DOC 0-1 FALLS W/OUT INJ PAST YR: ICD-10-PCS | Mod: CPTII,S$GLB,, | Performed by: INTERNAL MEDICINE

## 2021-07-21 ENCOUNTER — OFFICE VISIT (OUTPATIENT)
Dept: CARDIOLOGY | Facility: CLINIC | Age: 66
End: 2021-07-21
Payer: MEDICARE

## 2021-07-21 VITALS
WEIGHT: 209.19 LBS | OXYGEN SATURATION: 97 % | BODY MASS INDEX: 35.71 KG/M2 | SYSTOLIC BLOOD PRESSURE: 132 MMHG | HEART RATE: 90 BPM | HEIGHT: 64 IN | DIASTOLIC BLOOD PRESSURE: 60 MMHG

## 2021-07-21 DIAGNOSIS — I43 CARDIOMYOPATHY DUE TO HYPERTENSION, WITH HEART FAILURE: ICD-10-CM

## 2021-07-21 DIAGNOSIS — I25.10 CORONARY ARTERY DISEASE INVOLVING NATIVE CORONARY ARTERY OF NATIVE HEART WITHOUT ANGINA PECTORIS: ICD-10-CM

## 2021-07-21 DIAGNOSIS — I10 ESSENTIAL HYPERTENSION: Primary | ICD-10-CM

## 2021-07-21 DIAGNOSIS — R00.0 TACHYCARDIA: ICD-10-CM

## 2021-07-21 DIAGNOSIS — I42.9 CARDIOMYOPATHY, UNSPECIFIED TYPE: ICD-10-CM

## 2021-07-21 DIAGNOSIS — I11.0 CARDIOMYOPATHY DUE TO HYPERTENSION, WITH HEART FAILURE: ICD-10-CM

## 2021-07-21 DIAGNOSIS — E66.9 OBESITY (BMI 30-39.9): Chronic | ICD-10-CM

## 2021-07-21 DIAGNOSIS — I34.0 MODERATE MITRAL INSUFFICIENCY: ICD-10-CM

## 2021-07-21 PROCEDURE — 3008F PR BODY MASS INDEX (BMI) DOCUMENTED: ICD-10-PCS | Mod: CPTII,S$GLB,, | Performed by: INTERNAL MEDICINE

## 2021-07-21 PROCEDURE — 3288F FALL RISK ASSESSMENT DOCD: CPT | Mod: CPTII,S$GLB,, | Performed by: INTERNAL MEDICINE

## 2021-07-21 PROCEDURE — 3075F PR MOST RECENT SYSTOLIC BLOOD PRESS GE 130-139MM HG: ICD-10-PCS | Mod: CPTII,S$GLB,, | Performed by: INTERNAL MEDICINE

## 2021-07-21 PROCEDURE — 3008F BODY MASS INDEX DOCD: CPT | Mod: CPTII,S$GLB,, | Performed by: INTERNAL MEDICINE

## 2021-07-21 PROCEDURE — 99214 OFFICE O/P EST MOD 30 MIN: CPT | Mod: S$GLB,,, | Performed by: INTERNAL MEDICINE

## 2021-07-21 PROCEDURE — 3078F PR MOST RECENT DIASTOLIC BLOOD PRESSURE < 80 MM HG: ICD-10-PCS | Mod: CPTII,S$GLB,, | Performed by: INTERNAL MEDICINE

## 2021-07-21 PROCEDURE — 1159F PR MEDICATION LIST DOCUMENTED IN MEDICAL RECORD: ICD-10-PCS | Mod: CPTII,S$GLB,, | Performed by: INTERNAL MEDICINE

## 2021-07-21 PROCEDURE — 3075F SYST BP GE 130 - 139MM HG: CPT | Mod: CPTII,S$GLB,, | Performed by: INTERNAL MEDICINE

## 2021-07-21 PROCEDURE — 99214 PR OFFICE/OUTPT VISIT, EST, LEVL IV, 30-39 MIN: ICD-10-PCS | Mod: S$GLB,,, | Performed by: INTERNAL MEDICINE

## 2021-07-21 PROCEDURE — 3288F PR FALLS RISK ASSESSMENT DOCUMENTED: ICD-10-PCS | Mod: CPTII,S$GLB,, | Performed by: INTERNAL MEDICINE

## 2021-07-21 PROCEDURE — 3078F DIAST BP <80 MM HG: CPT | Mod: CPTII,S$GLB,, | Performed by: INTERNAL MEDICINE

## 2021-07-21 PROCEDURE — 1159F MED LIST DOCD IN RCRD: CPT | Mod: CPTII,S$GLB,, | Performed by: INTERNAL MEDICINE

## 2021-07-21 PROCEDURE — 1126F AMNT PAIN NOTED NONE PRSNT: CPT | Mod: CPTII,S$GLB,, | Performed by: INTERNAL MEDICINE

## 2021-07-21 PROCEDURE — 1126F PR PAIN SEVERITY QUANTIFIED, NO PAIN PRESENT: ICD-10-PCS | Mod: CPTII,S$GLB,, | Performed by: INTERNAL MEDICINE

## 2021-07-21 PROCEDURE — 1101F PT FALLS ASSESS-DOCD LE1/YR: CPT | Mod: CPTII,S$GLB,, | Performed by: INTERNAL MEDICINE

## 2021-07-21 PROCEDURE — 1101F PR PT FALLS ASSESS DOC 0-1 FALLS W/OUT INJ PAST YR: ICD-10-PCS | Mod: CPTII,S$GLB,, | Performed by: INTERNAL MEDICINE

## 2021-07-21 RX ORDER — METOPROLOL SUCCINATE 50 MG/1
TABLET, EXTENDED RELEASE ORAL
Qty: 90 TABLET | Refills: 3 | Status: SHIPPED | OUTPATIENT
Start: 2021-07-21 | End: 2021-07-21

## 2021-07-21 RX ORDER — METOPROLOL SUCCINATE 50 MG/1
50 TABLET, EXTENDED RELEASE ORAL 2 TIMES DAILY
Qty: 180 TABLET | Refills: 3 | Status: SHIPPED | OUTPATIENT
Start: 2021-07-21 | End: 2023-02-22

## 2021-07-27 ENCOUNTER — HOSPITAL ENCOUNTER (OUTPATIENT)
Dept: RADIOLOGY | Facility: HOSPITAL | Age: 66
Discharge: HOME | End: 2021-07-27
Attending: INTERNAL MEDICINE
Payer: MEDICARE

## 2021-07-27 DIAGNOSIS — M06.9 RHEUMATOID ARTHRITIS INVOLVING BOTH HANDS, UNSPECIFIED WHETHER RHEUMATOID FACTOR PRESENT: ICD-10-CM

## 2021-07-27 DIAGNOSIS — E78.2 MIXED HYPERLIPIDEMIA: ICD-10-CM

## 2021-07-27 DIAGNOSIS — N18.31 STAGE 3A CHRONIC KIDNEY DISEASE: ICD-10-CM

## 2021-07-27 DIAGNOSIS — I10 HYPERTENSION, UNSPECIFIED TYPE: ICD-10-CM

## 2021-07-27 PROCEDURE — 76770 US EXAM ABDO BACK WALL COMP: CPT | Mod: TC,PO

## 2021-09-30 ENCOUNTER — TELEPHONE (OUTPATIENT)
Dept: NEPHROLOGY | Facility: CLINIC | Age: 66
End: 2021-09-30

## 2021-09-30 ENCOUNTER — LAB VISIT (OUTPATIENT)
Dept: LAB | Facility: HOSPITAL | Age: 66
End: 2021-09-30
Attending: INTERNAL MEDICINE
Payer: MEDICARE

## 2021-09-30 DIAGNOSIS — N18.31 STAGE 3A CHRONIC KIDNEY DISEASE: ICD-10-CM

## 2021-09-30 DIAGNOSIS — M06.9 RHEUMATOID ARTHRITIS INVOLVING BOTH HANDS, UNSPECIFIED WHETHER RHEUMATOID FACTOR PRESENT: ICD-10-CM

## 2021-09-30 DIAGNOSIS — E78.2 MIXED HYPERLIPIDEMIA: ICD-10-CM

## 2021-09-30 DIAGNOSIS — I10 HYPERTENSION, UNSPECIFIED TYPE: ICD-10-CM

## 2021-09-30 LAB
ALBUMIN SERPL BCP-MCNC: 3.8 G/DL (ref 3.5–5.2)
ALP SERPL-CCNC: 67 U/L (ref 38–126)
ALT SERPL W/O P-5'-P-CCNC: 22 U/L (ref 10–44)
ANION GAP SERPL CALC-SCNC: 8 MMOL/L (ref 8–16)
AST SERPL-CCNC: 42 U/L (ref 15–46)
BASOPHILS # BLD AUTO: 0.07 K/UL (ref 0–0.2)
BASOPHILS NFR BLD: 1 % (ref 0–1.9)
BILIRUB SERPL-MCNC: 0.5 MG/DL (ref 0.1–1)
CALCIUM SERPL-MCNC: 9.2 MG/DL (ref 8.7–10.5)
CHLORIDE SERPL-SCNC: 101 MMOL/L (ref 95–110)
CO2 SERPL-SCNC: 33 MMOL/L (ref 23–29)
CREAT SERPL-MCNC: 1.03 MG/DL (ref 0.5–1.4)
DIFFERENTIAL METHOD: ABNORMAL
EOSINOPHIL # BLD AUTO: 0.4 K/UL (ref 0–0.5)
EOSINOPHIL NFR BLD: 5.2 % (ref 0–8)
ERYTHROCYTE [DISTWIDTH] IN BLOOD BY AUTOMATED COUNT: 13.8 % (ref 11.5–14.5)
EST. GFR  (AFRICAN AMERICAN): >60 ML/MIN/1.73 M^2
EST. GFR  (NON AFRICAN AMERICAN): 56.8 ML/MIN/1.73 M^2
GLUCOSE SERPL-MCNC: 88 MG/DL (ref 70–110)
HCT VFR BLD AUTO: 33.7 % (ref 37–48.5)
HGB BLD-MCNC: 10.7 G/DL (ref 12–16)
IMM GRANULOCYTES # BLD AUTO: 0.02 K/UL (ref 0–0.04)
IMM GRANULOCYTES NFR BLD AUTO: 0.3 % (ref 0–0.5)
LYMPHOCYTES # BLD AUTO: 1.9 K/UL (ref 1–4.8)
LYMPHOCYTES NFR BLD: 28.6 % (ref 18–48)
MAGNESIUM SERPL-MCNC: 1.6 MG/DL (ref 1.6–2.6)
MCH RBC QN AUTO: 29 PG (ref 27–31)
MCHC RBC AUTO-ENTMCNC: 31.8 G/DL (ref 32–36)
MCV RBC AUTO: 91 FL (ref 82–98)
MONOCYTES # BLD AUTO: 0.7 K/UL (ref 0.3–1)
MONOCYTES NFR BLD: 11 % (ref 4–15)
NEUTROPHILS # BLD AUTO: 3.6 K/UL (ref 1.8–7.7)
NEUTROPHILS NFR BLD: 53.9 % (ref 38–73)
NRBC BLD-RTO: 0 /100 WBC
PHOSPHATE SERPL-MCNC: 3.4 MG/DL (ref 2.7–4.5)
PLATELET # BLD AUTO: 239 K/UL (ref 150–450)
PMV BLD AUTO: 11 FL (ref 9.2–12.9)
POTASSIUM SERPL-SCNC: 3 MMOL/L (ref 3.5–5.1)
PROT SERPL-MCNC: 6.9 G/DL (ref 6–8.4)
RBC # BLD AUTO: 3.69 M/UL (ref 4–5.4)
SODIUM SERPL-SCNC: 142 MMOL/L (ref 136–145)
URATE SERPL-MCNC: 6.3 MG/DL (ref 2.4–5.7)
UUN UR-MCNC: 15 MG/DL (ref 7–17)
WBC # BLD AUTO: 6.74 K/UL (ref 3.9–12.7)

## 2021-09-30 PROCEDURE — 36415 COLL VENOUS BLD VENIPUNCTURE: CPT | Mod: PO | Performed by: INTERNAL MEDICINE

## 2021-09-30 PROCEDURE — 80053 COMPREHEN METABOLIC PANEL: CPT | Mod: PO | Performed by: INTERNAL MEDICINE

## 2021-09-30 PROCEDURE — 83735 ASSAY OF MAGNESIUM: CPT | Mod: PO | Performed by: INTERNAL MEDICINE

## 2021-09-30 PROCEDURE — 84550 ASSAY OF BLOOD/URIC ACID: CPT | Performed by: INTERNAL MEDICINE

## 2021-09-30 PROCEDURE — 85025 COMPLETE CBC W/AUTO DIFF WBC: CPT | Mod: PO | Performed by: INTERNAL MEDICINE

## 2021-09-30 PROCEDURE — 84100 ASSAY OF PHOSPHORUS: CPT | Mod: PO | Performed by: INTERNAL MEDICINE

## 2021-10-04 DIAGNOSIS — I42.9 CARDIOMYOPATHY, UNSPECIFIED TYPE: ICD-10-CM

## 2021-10-04 RX ORDER — VALSARTAN 160 MG/1
160 TABLET ORAL DAILY
Qty: 90 TABLET | Refills: 3 | Status: SHIPPED | OUTPATIENT
Start: 2021-10-04 | End: 2021-11-04

## 2021-10-04 RX ORDER — FUROSEMIDE 40 MG/1
40 TABLET ORAL DAILY
Qty: 90 TABLET | Refills: 3 | Status: SHIPPED | OUTPATIENT
Start: 2021-10-04 | End: 2021-12-14

## 2021-10-30 ENCOUNTER — IMMUNIZATION (OUTPATIENT)
Dept: INTERNAL MEDICINE | Facility: CLINIC | Age: 66
End: 2021-10-30
Payer: MEDICARE

## 2021-10-30 PROCEDURE — G0008 FLU VACCINE - QUADRIVALENT - ADJUVANTED: ICD-10-PCS | Mod: S$GLB,,, | Performed by: INTERNAL MEDICINE

## 2021-10-30 PROCEDURE — 90694 FLU VACCINE - QUADRIVALENT - ADJUVANTED: ICD-10-PCS | Mod: S$GLB,,, | Performed by: INTERNAL MEDICINE

## 2021-10-30 PROCEDURE — G0008 ADMIN INFLUENZA VIRUS VAC: HCPCS | Mod: S$GLB,,, | Performed by: INTERNAL MEDICINE

## 2021-10-30 PROCEDURE — 90694 VACC AIIV4 NO PRSRV 0.5ML IM: CPT | Mod: S$GLB,,, | Performed by: INTERNAL MEDICINE

## 2021-11-04 ENCOUNTER — OFFICE VISIT (OUTPATIENT)
Dept: NEPHROLOGY | Facility: CLINIC | Age: 66
End: 2021-11-04
Payer: MEDICARE

## 2021-11-04 VITALS
WEIGHT: 201.19 LBS | SYSTOLIC BLOOD PRESSURE: 150 MMHG | BODY MASS INDEX: 34.35 KG/M2 | DIASTOLIC BLOOD PRESSURE: 85 MMHG | HEIGHT: 64 IN | HEART RATE: 97 BPM

## 2021-11-04 DIAGNOSIS — E78.2 MIXED HYPERLIPIDEMIA: ICD-10-CM

## 2021-11-04 DIAGNOSIS — M05.731 RHEUMATOID ARTHRITIS INVOLVING BOTH WRISTS WITH POSITIVE RHEUMATOID FACTOR: ICD-10-CM

## 2021-11-04 DIAGNOSIS — N18.4 CKD (CHRONIC KIDNEY DISEASE) STAGE 4, GFR 15-29 ML/MIN: Primary | ICD-10-CM

## 2021-11-04 DIAGNOSIS — M05.732 RHEUMATOID ARTHRITIS INVOLVING BOTH WRISTS WITH POSITIVE RHEUMATOID FACTOR: ICD-10-CM

## 2021-11-04 DIAGNOSIS — I10 HYPERTENSION, UNSPECIFIED TYPE: ICD-10-CM

## 2021-11-04 PROCEDURE — 1101F PR PT FALLS ASSESS DOC 0-1 FALLS W/OUT INJ PAST YR: ICD-10-PCS | Mod: CPTII,S$GLB,, | Performed by: INTERNAL MEDICINE

## 2021-11-04 PROCEDURE — 3288F FALL RISK ASSESSMENT DOCD: CPT | Mod: CPTII,S$GLB,, | Performed by: INTERNAL MEDICINE

## 2021-11-04 PROCEDURE — 3008F BODY MASS INDEX DOCD: CPT | Mod: CPTII,S$GLB,, | Performed by: INTERNAL MEDICINE

## 2021-11-04 PROCEDURE — 1126F AMNT PAIN NOTED NONE PRSNT: CPT | Mod: CPTII,S$GLB,, | Performed by: INTERNAL MEDICINE

## 2021-11-04 PROCEDURE — 3066F NEPHROPATHY DOC TX: CPT | Mod: CPTII,S$GLB,, | Performed by: INTERNAL MEDICINE

## 2021-11-04 PROCEDURE — 3008F PR BODY MASS INDEX (BMI) DOCUMENTED: ICD-10-PCS | Mod: CPTII,S$GLB,, | Performed by: INTERNAL MEDICINE

## 2021-11-04 PROCEDURE — 1159F PR MEDICATION LIST DOCUMENTED IN MEDICAL RECORD: ICD-10-PCS | Mod: CPTII,S$GLB,, | Performed by: INTERNAL MEDICINE

## 2021-11-04 PROCEDURE — 3077F SYST BP >= 140 MM HG: CPT | Mod: CPTII,S$GLB,, | Performed by: INTERNAL MEDICINE

## 2021-11-04 PROCEDURE — 1126F PR PAIN SEVERITY QUANTIFIED, NO PAIN PRESENT: ICD-10-PCS | Mod: CPTII,S$GLB,, | Performed by: INTERNAL MEDICINE

## 2021-11-04 PROCEDURE — 99999 PR PBB SHADOW E&M-EST. PATIENT-LVL III: CPT | Mod: PBBFAC,,, | Performed by: INTERNAL MEDICINE

## 2021-11-04 PROCEDURE — 3061F PR NEG MICROALBUMINURIA RESULT DOCUMENTED/REVIEW: ICD-10-PCS | Mod: CPTII,S$GLB,, | Performed by: INTERNAL MEDICINE

## 2021-11-04 PROCEDURE — 99214 OFFICE O/P EST MOD 30 MIN: CPT | Mod: S$GLB,,, | Performed by: INTERNAL MEDICINE

## 2021-11-04 PROCEDURE — 99214 PR OFFICE/OUTPT VISIT, EST, LEVL IV, 30-39 MIN: ICD-10-PCS | Mod: S$GLB,,, | Performed by: INTERNAL MEDICINE

## 2021-11-04 PROCEDURE — 3066F PR DOCUMENTATION OF TREATMENT FOR NEPHROPATHY: ICD-10-PCS | Mod: CPTII,S$GLB,, | Performed by: INTERNAL MEDICINE

## 2021-11-04 PROCEDURE — 4010F PR ACE/ARB THEARPY RXD/TAKEN: ICD-10-PCS | Mod: CPTII,S$GLB,, | Performed by: INTERNAL MEDICINE

## 2021-11-04 PROCEDURE — 4010F ACE/ARB THERAPY RXD/TAKEN: CPT | Mod: CPTII,S$GLB,, | Performed by: INTERNAL MEDICINE

## 2021-11-04 PROCEDURE — 1101F PT FALLS ASSESS-DOCD LE1/YR: CPT | Mod: CPTII,S$GLB,, | Performed by: INTERNAL MEDICINE

## 2021-11-04 PROCEDURE — 3077F PR MOST RECENT SYSTOLIC BLOOD PRESSURE >= 140 MM HG: ICD-10-PCS | Mod: CPTII,S$GLB,, | Performed by: INTERNAL MEDICINE

## 2021-11-04 PROCEDURE — 3079F PR MOST RECENT DIASTOLIC BLOOD PRESSURE 80-89 MM HG: ICD-10-PCS | Mod: CPTII,S$GLB,, | Performed by: INTERNAL MEDICINE

## 2021-11-04 PROCEDURE — 1159F MED LIST DOCD IN RCRD: CPT | Mod: CPTII,S$GLB,, | Performed by: INTERNAL MEDICINE

## 2021-11-04 PROCEDURE — 3288F PR FALLS RISK ASSESSMENT DOCUMENTED: ICD-10-PCS | Mod: CPTII,S$GLB,, | Performed by: INTERNAL MEDICINE

## 2021-11-04 PROCEDURE — 3061F NEG MICROALBUMINURIA REV: CPT | Mod: CPTII,S$GLB,, | Performed by: INTERNAL MEDICINE

## 2021-11-04 PROCEDURE — 99999 PR PBB SHADOW E&M-EST. PATIENT-LVL III: ICD-10-PCS | Mod: PBBFAC,,, | Performed by: INTERNAL MEDICINE

## 2021-11-04 PROCEDURE — 3079F DIAST BP 80-89 MM HG: CPT | Mod: CPTII,S$GLB,, | Performed by: INTERNAL MEDICINE

## 2021-11-04 RX ORDER — VALSARTAN 320 MG/1
320 TABLET ORAL DAILY
Qty: 90 TABLET | Refills: 3 | Status: SHIPPED | OUTPATIENT
Start: 2021-11-04 | End: 2023-03-13

## 2022-02-02 ENCOUNTER — LAB VISIT (OUTPATIENT)
Dept: LAB | Facility: HOSPITAL | Age: 67
End: 2022-02-02
Attending: INTERNAL MEDICINE
Payer: MEDICARE

## 2022-02-02 DIAGNOSIS — N18.4 CKD (CHRONIC KIDNEY DISEASE) STAGE 4, GFR 15-29 ML/MIN: ICD-10-CM

## 2022-02-02 DIAGNOSIS — I10 HYPERTENSION, UNSPECIFIED TYPE: ICD-10-CM

## 2022-02-02 DIAGNOSIS — M05.731 RHEUMATOID ARTHRITIS INVOLVING BOTH WRISTS WITH POSITIVE RHEUMATOID FACTOR: ICD-10-CM

## 2022-02-02 DIAGNOSIS — E78.2 MIXED HYPERLIPIDEMIA: ICD-10-CM

## 2022-02-02 DIAGNOSIS — M05.732 RHEUMATOID ARTHRITIS INVOLVING BOTH WRISTS WITH POSITIVE RHEUMATOID FACTOR: ICD-10-CM

## 2022-02-02 LAB
ALBUMIN SERPL BCP-MCNC: 3.8 G/DL (ref 3.5–5.2)
ALP SERPL-CCNC: 68 U/L (ref 38–126)
ALT SERPL W/O P-5'-P-CCNC: 17 U/L (ref 10–44)
ANION GAP SERPL CALC-SCNC: 7 MMOL/L (ref 8–16)
AST SERPL-CCNC: 30 U/L (ref 15–46)
BASOPHILS # BLD AUTO: 0.06 K/UL (ref 0–0.2)
BASOPHILS NFR BLD: 1.1 % (ref 0–1.9)
BILIRUB SERPL-MCNC: 0.4 MG/DL (ref 0.1–1)
CALCIUM SERPL-MCNC: 9.2 MG/DL (ref 8.7–10.5)
CHLORIDE SERPL-SCNC: 101 MMOL/L (ref 95–110)
CO2 SERPL-SCNC: 32 MMOL/L (ref 23–29)
CREAT SERPL-MCNC: 1.08 MG/DL (ref 0.5–1.4)
DIFFERENTIAL METHOD: ABNORMAL
EOSINOPHIL # BLD AUTO: 0.5 K/UL (ref 0–0.5)
EOSINOPHIL NFR BLD: 8.1 % (ref 0–8)
ERYTHROCYTE [DISTWIDTH] IN BLOOD BY AUTOMATED COUNT: 13.7 % (ref 11.5–14.5)
EST. GFR  (AFRICAN AMERICAN): >60 ML/MIN/1.73 M^2
EST. GFR  (NON AFRICAN AMERICAN): 53.2 ML/MIN/1.73 M^2
GLUCOSE SERPL-MCNC: 108 MG/DL (ref 70–110)
HCT VFR BLD AUTO: 33.1 % (ref 37–48.5)
HGB BLD-MCNC: 10.5 G/DL (ref 12–16)
IMM GRANULOCYTES # BLD AUTO: 0.01 K/UL (ref 0–0.04)
IMM GRANULOCYTES NFR BLD AUTO: 0.2 % (ref 0–0.5)
LYMPHOCYTES # BLD AUTO: 2 K/UL (ref 1–4.8)
LYMPHOCYTES NFR BLD: 34.7 % (ref 18–48)
MAGNESIUM SERPL-MCNC: 1.8 MG/DL (ref 1.6–2.6)
MCH RBC QN AUTO: 29 PG (ref 27–31)
MCHC RBC AUTO-ENTMCNC: 31.7 G/DL (ref 32–36)
MCV RBC AUTO: 91 FL (ref 82–98)
MONOCYTES # BLD AUTO: 0.4 K/UL (ref 0.3–1)
MONOCYTES NFR BLD: 7.1 % (ref 4–15)
NEUTROPHILS # BLD AUTO: 2.8 K/UL (ref 1.8–7.7)
NEUTROPHILS NFR BLD: 48.8 % (ref 38–73)
NRBC BLD-RTO: 0 /100 WBC
PHOSPHATE SERPL-MCNC: 3.9 MG/DL (ref 2.7–4.5)
PLATELET # BLD AUTO: 219 K/UL (ref 150–450)
PMV BLD AUTO: 10.3 FL (ref 9.2–12.9)
POTASSIUM SERPL-SCNC: 3.7 MMOL/L (ref 3.5–5.1)
PROT SERPL-MCNC: 7.1 G/DL (ref 6–8.4)
RBC # BLD AUTO: 3.62 M/UL (ref 4–5.4)
SODIUM SERPL-SCNC: 140 MMOL/L (ref 136–145)
UUN UR-MCNC: 17 MG/DL (ref 7–17)
WBC # BLD AUTO: 5.65 K/UL (ref 3.9–12.7)

## 2022-02-02 PROCEDURE — 36415 COLL VENOUS BLD VENIPUNCTURE: CPT | Mod: PO | Performed by: INTERNAL MEDICINE

## 2022-02-02 PROCEDURE — 84100 ASSAY OF PHOSPHORUS: CPT | Mod: PO | Performed by: INTERNAL MEDICINE

## 2022-02-02 PROCEDURE — 83735 ASSAY OF MAGNESIUM: CPT | Mod: PO | Performed by: INTERNAL MEDICINE

## 2022-02-02 PROCEDURE — 85025 COMPLETE CBC W/AUTO DIFF WBC: CPT | Mod: PO | Performed by: INTERNAL MEDICINE

## 2022-02-02 PROCEDURE — 80053 COMPREHEN METABOLIC PANEL: CPT | Mod: PO | Performed by: INTERNAL MEDICINE

## 2022-02-10 ENCOUNTER — OFFICE VISIT (OUTPATIENT)
Dept: NEPHROLOGY | Facility: CLINIC | Age: 67
End: 2022-02-10
Payer: MEDICARE

## 2022-02-10 VITALS
DIASTOLIC BLOOD PRESSURE: 82 MMHG | TEMPERATURE: 99 F | HEIGHT: 64 IN | SYSTOLIC BLOOD PRESSURE: 141 MMHG | HEART RATE: 109 BPM | WEIGHT: 204.06 LBS | BODY MASS INDEX: 34.84 KG/M2 | RESPIRATION RATE: 18 BRPM

## 2022-02-10 DIAGNOSIS — I10 PRIMARY HYPERTENSION: ICD-10-CM

## 2022-02-10 DIAGNOSIS — N18.2 CKD (CHRONIC KIDNEY DISEASE) STAGE 2, GFR 60-89 ML/MIN: Primary | ICD-10-CM

## 2022-02-10 DIAGNOSIS — D63.8 ANEMIA OF CHRONIC DISEASE: ICD-10-CM

## 2022-02-10 DIAGNOSIS — M06.9 ACUTE RHEUMATOID ARTHRITIS: ICD-10-CM

## 2022-02-10 PROCEDURE — 3079F PR MOST RECENT DIASTOLIC BLOOD PRESSURE 80-89 MM HG: ICD-10-PCS | Mod: CPTII,S$GLB,, | Performed by: INTERNAL MEDICINE

## 2022-02-10 PROCEDURE — 3066F NEPHROPATHY DOC TX: CPT | Mod: CPTII,S$GLB,, | Performed by: INTERNAL MEDICINE

## 2022-02-10 PROCEDURE — 99213 OFFICE O/P EST LOW 20 MIN: CPT | Mod: S$GLB,,, | Performed by: INTERNAL MEDICINE

## 2022-02-10 PROCEDURE — 99213 PR OFFICE/OUTPT VISIT, EST, LEVL III, 20-29 MIN: ICD-10-PCS | Mod: S$GLB,,, | Performed by: INTERNAL MEDICINE

## 2022-02-10 PROCEDURE — 1159F MED LIST DOCD IN RCRD: CPT | Mod: CPTII,S$GLB,, | Performed by: INTERNAL MEDICINE

## 2022-02-10 PROCEDURE — 1125F AMNT PAIN NOTED PAIN PRSNT: CPT | Mod: CPTII,S$GLB,, | Performed by: INTERNAL MEDICINE

## 2022-02-10 PROCEDURE — 3008F PR BODY MASS INDEX (BMI) DOCUMENTED: ICD-10-PCS | Mod: CPTII,S$GLB,, | Performed by: INTERNAL MEDICINE

## 2022-02-10 PROCEDURE — 3077F SYST BP >= 140 MM HG: CPT | Mod: CPTII,S$GLB,, | Performed by: INTERNAL MEDICINE

## 2022-02-10 PROCEDURE — 3288F PR FALLS RISK ASSESSMENT DOCUMENTED: ICD-10-PCS | Mod: CPTII,S$GLB,, | Performed by: INTERNAL MEDICINE

## 2022-02-10 PROCEDURE — 3288F FALL RISK ASSESSMENT DOCD: CPT | Mod: CPTII,S$GLB,, | Performed by: INTERNAL MEDICINE

## 2022-02-10 PROCEDURE — 3066F PR DOCUMENTATION OF TREATMENT FOR NEPHROPATHY: ICD-10-PCS | Mod: CPTII,S$GLB,, | Performed by: INTERNAL MEDICINE

## 2022-02-10 PROCEDURE — 4010F PR ACE/ARB THEARPY RXD/TAKEN: ICD-10-PCS | Mod: CPTII,S$GLB,, | Performed by: INTERNAL MEDICINE

## 2022-02-10 PROCEDURE — 3079F DIAST BP 80-89 MM HG: CPT | Mod: CPTII,S$GLB,, | Performed by: INTERNAL MEDICINE

## 2022-02-10 PROCEDURE — 1101F PR PT FALLS ASSESS DOC 0-1 FALLS W/OUT INJ PAST YR: ICD-10-PCS | Mod: CPTII,S$GLB,, | Performed by: INTERNAL MEDICINE

## 2022-02-10 PROCEDURE — 1159F PR MEDICATION LIST DOCUMENTED IN MEDICAL RECORD: ICD-10-PCS | Mod: CPTII,S$GLB,, | Performed by: INTERNAL MEDICINE

## 2022-02-10 PROCEDURE — 3008F BODY MASS INDEX DOCD: CPT | Mod: CPTII,S$GLB,, | Performed by: INTERNAL MEDICINE

## 2022-02-10 PROCEDURE — 4010F ACE/ARB THERAPY RXD/TAKEN: CPT | Mod: CPTII,S$GLB,, | Performed by: INTERNAL MEDICINE

## 2022-02-10 PROCEDURE — 1125F PR PAIN SEVERITY QUANTIFIED, PAIN PRESENT: ICD-10-PCS | Mod: CPTII,S$GLB,, | Performed by: INTERNAL MEDICINE

## 2022-02-10 PROCEDURE — 3077F PR MOST RECENT SYSTOLIC BLOOD PRESSURE >= 140 MM HG: ICD-10-PCS | Mod: CPTII,S$GLB,, | Performed by: INTERNAL MEDICINE

## 2022-02-10 PROCEDURE — 99999 PR PBB SHADOW E&M-EST. PATIENT-LVL III: ICD-10-PCS | Mod: PBBFAC,,, | Performed by: INTERNAL MEDICINE

## 2022-02-10 PROCEDURE — 99999 PR PBB SHADOW E&M-EST. PATIENT-LVL III: CPT | Mod: PBBFAC,,, | Performed by: INTERNAL MEDICINE

## 2022-02-10 PROCEDURE — 1101F PT FALLS ASSESS-DOCD LE1/YR: CPT | Mod: CPTII,S$GLB,, | Performed by: INTERNAL MEDICINE

## 2022-02-10 NOTE — PROGRESS NOTES
Name: Christina Tomlinson  Medical Record Number: [id number]  Date of Service: 02/10/2022  Note By: Frederic Patrick,     [facility] Nephrology Consult    Reason for Consultation: CKD 2  Referring Provider: Dr. Castillo     History of Present Illness:  Christina Tomlinson is a 65 y.o. female with past medical history of CKD 3 who presents for follow-up.  Without c/o CP, SOB, N/V, C/F. No foam or blood in urine, no dysuria. No EUSEBIO, LOS, LOT. Sts had COVID vaccine. Sts had booster shot today. Denies any stone passage, flank pain.     Past Medical History:  Past Medical History:   Diagnosis Date    Arthritis     Cardiomyopathy     Coronary artery disease     GERD (gastroesophageal reflux disease)     Hypertension        Past Surgical History:  Past Surgical History:   Procedure Laterality Date    CHOLECYSTECTOMY      COLONOSCOPY N/A 2/1/2016    Procedure: COLONOSCOPY;  Surgeon: Aidan Reynoso Jr., MD;  Location: [facility];  Service: Endoscopy;  Laterality: N/A;    CYSTOSCOPY  10/5/2018    Procedure: CYSTOSCOPY;  Surgeon: Everette Quiroz MD;  Location: [address];  Service: Urology;;    HYSTERECTOMY      JOINT REPLACEMENT Right     TKA    KNEE ARTHROSCOPY      LASER LITHOTRIPSY Left 10/5/2018    Procedure: LITHOTRIPSY, USING LASER;  Surgeon: Everette Quiroz MD;  Location: [address];  Service: Urology;  Laterality: Left;    OTHER SURGICAL HISTORY      PARATHYROIDECTOMY      RETROGRADE PYELOGRAPHY Left 10/5/2018    Procedure: PYELOGRAM, RETROGRADE;  Surgeon: Everette Quiroz MD;  Location: [address];  Service: Urology;  Laterality: Left;    TONSILLECTOMY      URETEROSCOPY Left 10/5/2018    Procedure: URETEROSCOPY;  Surgeon: Everette Quiroz MD;  Location: [address];  Service: Urology;  Laterality: Left;  90 min       Family History:  Family History   Problem Relation Age of Onset    Diabetes Father     Kidney disease Father     Heart failure Mother     Heart disease Mother         Social History:  Social History     Socioeconomic History    Marital status:    Tobacco Use    Smoking status: Never Smoker    Smokeless tobacco: Never Used   Substance and Sexual Activity    Alcohol use: No     Alcohol/week: 0.0 standard drinks    Drug use: No    Sexual activity: Not Currently       Home Medications:   (Not in a hospital admission)      Allergies:  Lisinopril    Review of Systems:  10 point review of systems was conducted and was negative except as mentioned in the HPI.    Physical Exam:  Vitals:  There were no vitals filed for this visit.  [unfilled]      Exam  General: No acute distress, well groomed, alert and oriented x 3  HEENT: Normocephalic, atraumatic, EOM's intact bilaterally, external inspection of ears and nose normal, moist mucous membranes, no oral ulcerations/lesions  Neck: Supple, symmetrical, trachea midline, no thyromegaly, no JVD  Respiratory: Clear to auscultation bilaterally, respirations unlabored, no rales/rhonchi/wheezing  Cardiovacular: Regular rate and rhythm, S1, S2 normal, no murmurs, rubs or gallops  Gastrointestinal: Soft, non-tender, bowel sounds normal, no hepatosplenomegaly  Musculoskeletal: No knee or ankle joint tenderness or swelling.   Extremities: No clubbing or cyanosis of bilateral upper extremities; no lower extremity edema bilaterally, radial pulses 2+ bilaterally, symmetric. C/o (L) hand pain in fingers and MCP.  Skin: warm and dry; no rash on exposed skin  Neurologic: CN grossly intact, no asterixis.    Labs:  A1C:      CBC:  Recent Labs   Lab 07/09/21  1204 07/09/21  1204 09/30/21  0842 09/30/21  0842 02/02/22  1443   WBC 6.1   < > 6.74   < > 5.65   RBC 3.56 L   < > 3.69 L   < > 3.62 L   Hemoglobin 10.4 L   < > 10.7 L   < > 10.5 L   Hematocrit 32.1 L   < > 33.7 L   < > 33.1 L   Platelets 219   < > 239   < > 219   MCV 90.2   < > 91   < > 91   MCH 29.2   < > 29.0   < > 29.0   MCHC 32.4  --  31.8 L  --  31.7 L    < > = values in this  interval not displayed.     CMP:  Recent Labs   Lab 02/02/22  1443   Glucose 108   Calcium 9.2   Albumin 3.8   Total Protein 7.1   Sodium 140   Potassium 3.7   CO2 32 H   Chloride 101   BUN 17   Creatinine 1.08   Alkaline Phosphatase 68   ALT 17   AST 30   Total Bilirubin 0.4   eGFR if  >60.0     LIPIDS:  Recent Labs   Lab 02/11/21  1033 07/09/21  1204   TSH 1.210 2.18   HDL  --  46 L   Cholesterol  --  139   Triglycerides  --  134   LDL Cholesterol  --  71   HDL/Cholesterol Ratio  --  3.0   Non HDL Chol. (LDL+VLDL)  --  93     TSH:  Recent Labs   Lab 02/11/21  1033 07/09/21  1204   TSH 1.210 2.18     URINALYSIS:  Recent Labs   Lab Result Units 02/02/22  1448   Color, UA  Yellow   Specific Gravity, UA  1.025   pH, UA  5.0   Protein, UA  Trace*   Nitrite, UA  Negative   Leukocytes, UA  Negative   Urobilinogen, UA EU/dL Negative        Lab Results   Component Value Date    WBC 5.65 02/02/2022    HGB 10.5 (L) 02/02/2022    HCT 33.1 (L) 02/02/2022     02/02/2022    K 3.7 02/02/2022     02/02/2022    CO2 32 (H) 02/02/2022    BUN 17 02/02/2022    CREATININE 1.08 02/02/2022    EGFRNONAA 53.2 (A) 02/02/2022    CALCIUM 9.2 02/02/2022    PHOS 3.9 02/02/2022    MG 1.8 02/02/2022    ALBUMIN 3.8 02/02/2022    AST 30 02/02/2022    ALT 17 02/02/2022       No results found for: EXTANC, EXTWBC, EXTSEGS, EXTPLATELETS, EXTHEMOGLOBI, EXTHEMATOCRI, EXTCREATININ, EXTSODIUM, EXTPOTASSIUM, EXTBUN, EXTCO2, EXTCALCIUM, EXTPHOSPHORU, EXTGLUCOSE, EXTALBUMIN, EXTAST, EXTALT, EXTBILITOTAL, EXTLIPASE, EXTAMYLASE    No results found for: EXTCYCLOSLVL, EXTSIROLIMUS, EXTTACROLVL, EXTPROTCRE, EXTPTHINTACT, EXTPROTEINUA, EXTWBCUA, EXTRBCUA    Imaging Studies: n/a  ?     Assessment/Plan:  65 y.o. female with:     1- CKD 2- Renal function/GFR is much better.  Avoid dehydration and NSAIDS.  She is non-oliguric. She has trace proteinuria. Hx of PTHx for PHPTH in 2015.     2. Hypertension - is not well controlled. Sts is well  controlled at home.      3. Rheumatoid arthritis - F/U with Dr. Frye. Avoid NSAIDs or other known nephrotoxic medications. Sts having (L) hand pain. Will see Dr. Frye on Monday.     4. Coronary artery disease - F/U with Dr. Talamantes in [place].     5. Anemia - drop in H/H noted. Encourage iron intake but MCV is in normal range.     6. Glaucoma - Using eyedrops Brimonidine tartrate        Frederic Patrick DO  [facility]ph[facility]

## 2022-03-09 ENCOUNTER — TELEPHONE (OUTPATIENT)
Dept: CARDIOLOGY | Facility: CLINIC | Age: 67
End: 2022-03-09
Payer: MEDICARE

## 2022-03-09 NOTE — TELEPHONE ENCOUNTER
Pt called office spectra link     Request to reschedule follow up appt with Dr. Madrigal at [facility]    Rescheduled appt to Wednesday 03-30-22 at 8 am  Mailed appt reminder to the pt    ND

## 2022-03-28 NOTE — PROGRESS NOTES
Subjective:   Patient ID:  Christina Tomlinson is a 66 y.o. female who presents for evaluation of cardiomyopathy    HPI:   Here for follow up   She stated that she has been doing ok,  She exercise 3 times a week at [facility] with no issues  No chest pain, no palpitations  No LE edema, no orthopnea.   Compliant with her medications        Hx of cardiomyopathy with LVEF ~ 45  9/18/19  Ef improved to 45% so not needing AICD.     Pt was previously seen by Dr. Solorio/Dr. Mckeon .    CATH 6/2016  Angiographic Results     Diagnostic:          Patient has a left dominant coronary artery.        - Left Main Coronary Artery:             The ostial LM is normal. There is ARIELA 3 flow.     - Left Anterior Descending Artery:             The LAD is normal. There is ARIELA 3 flow.     - D1:             The D1 has luminal irregularities. There is ARIELA 3 flow.     - Ramus:             The proximal ramus has a 60% stenosis. There is ARIELA 3 flow.     - Left Circumflex Artery:             The LCX is normal. There is ARIELA 3 flow.     - Right Coronary Artery:             The proximal RCA has a 75% stenosis. There is ARIELA 3 flow. The remaining portion of the vessel is of small caliber.      TTE 2/11/2021  · Mild concentric hypertrophy and low normal systolic function. The estimated ejection fraction is 50%  · Mild-to-moderate mitral regurgitation.  · Normal left ventricular diastolic function.  · Mild pulmonic regurgitation.  · Mild tricuspid regurgitation.  · Normal right ventricular size with normal right ventricular systolic function.  · There is pulmonary hypertension.    TTE 1/2017      Mildly depressed left ventricular systolic function (EF 45-50%).     2 - Normal left ventricular diastolic function.     3 - Normal right ventricular systolic function .     4 - Moderate mitral regurgitation.     DEVAN 8/2016    Moderately depressed left ventricular systolic function (EF 35-40%).     2 - Mild to moderate mitral regurgitation.     3 -  Left atrial appendage is single lobed.     4 - Left atrium is enlarged.     5 - Grade 1 atheroma disease of aorta.        Holter Monitor 2021  · Sinus rhythm HR  bpm (av bpm)  · Very rare PACs, PVCs  · Diary not returned to correlate any symptoms    Patient Active Problem List    Diagnosis Date Noted    Morbid (severe) obesity due to excess calories 2022    Coronary artery disease involving native coronary artery of native heart with angina pectoris 2022    Obesity (BMI 30-39.9) 02/10/2021    Chronic pain of both shoulders 2020    Decreased ROM of right shoulder 2020    Decreased ROM of left shoulder 2020    Muscle weakness of left arm 2020    Muscle weakness of right arm 2020    Urolithiasis 10/05/2018    Moderate mitral insufficiency 01/10/2017    Cardiomyopathy 2016    Coronary artery disease involving native coronary artery of native heart without angina pectoris 2016    Cardiomyopathy due to hypertension, with heart failure 2016     L dominant system, LAD, D1, LCx patent, Ramus 60%, small RCA with 75% stenosis, EF 40% with LVEDP 28 mmHg  MR not well visualized, at least moderate      Decreased ROM of right knee 03/15/2016    Weakness of right lower extremity 03/15/2016    Difficulty walking 03/15/2016    Decreased functional mobility 03/15/2016    S/P total knee arthroplasty 2016    Knee pain 2016    Essential hypertension 2016    Anemia of chronic disease 2016    Aftercare following right knee joint replacement surgery 2016    Anemia 2016    Primary hyperparathyroidism 2015    Acute rheumatoid arthritis 10/08/2015    Primary osteoarthritis of right knee 2015    Back pain of thoracolumbar region 2015    GERD (gastroesophageal reflux disease) 2015    Arthritis of knee 2015    Arthritis of hand 2015    Medial meniscus tear 2014     Dysphagia, oropharyngeal 09/06/2013    Dysphagia, unspecified(787.20) 09/06/2013     Dx updated per 2019 IMO Load      Breast cancer screening 07/30/2013    Sore throat 01/07/2013    Torn rotator cuff 01/07/2013    Corns and callosities 11/30/2012    Dermatophytosis of nail 11/30/2012    Pain in limb 11/30/2012       Patient's Medications   New Prescriptions    No medications on file   Previous Medications    ACETAMINOPHEN (TYLENOL) 500 MG TABLET    Take 500 mg by mouth every 6 (six) hours as needed for Pain.    AMITRIPTYLINE (ELAVIL) 25 MG TABLET    Take 25 mg by mouth nightly as needed for Insomnia.    ASPIRIN (ECOTRIN) 81 MG EC TABLET    Take 1 tablet (81 mg total) by mouth once. for 1 dose    ATORVASTATIN (LIPITOR) 40 MG TABLET    Take 1 tablet by mouth once daily    BRIMONIDINE 0.15 % OPTH DROP (ALPHAGAN) 0.15 % OPHTHALMIC SOLUTION    1 drop 3 (three) times daily.    CHOLECALCIFEROL, VITAMIN D3, 3,000 UNIT TAB    Take by mouth.    FAMOTIDINE (PEPCID AC ORAL)    Take by mouth.    FERROUS GLUCONATE (FERGON) 240 (27 FE) MG TABLET    Take 480 mg by mouth 3 (three) times daily.    FUROSEMIDE (LASIX) 40 MG TABLET    Take 1 tablet (40 mg total) by mouth once daily.    HYDROXYCHLOROQUINE (PLAQUENIL) 200 MG TABLET    Take 200 mg by mouth 2 (two) times daily.     LEFLUNOMIDE (ARAVA) 10 MG TAB    Take 20 mg by mouth once daily.    MECLIZINE (ANTIVERT) 25 MG TABLET    Take 25 mg by mouth 3 (three) times daily as needed.    METOPROLOL SUCCINATE (TOPROL-XL) 50 MG 24 HR TABLET    Take 1 tablet (50 mg total) by mouth 2 (two) times daily.    VALSARTAN (DIOVAN) 320 MG TABLET    Take 1 tablet (320 mg total) by mouth once daily.   Modified Medications    No medications on file   Discontinued Medications    PREDNISONE (DELTASONE) 2.5 MG TABLET    Take 2.5 mg by mouth once daily.         Review of Systems   Constitutional: Negative for chills and fever.   HENT: Negative for hearing loss and nosebleeds.    Eyes: Negative  for blurred vision.   Cardiovascular: Negative for chest pain, leg swelling and palpitations.   Respiratory: Negative for hemoptysis and shortness of breath.    Hematologic/Lymphatic: Negative for bleeding problem.   Skin: Negative for itching.   Musculoskeletal: Negative for falls.   Gastrointestinal: Negative for abdominal pain and hematochezia.   Genitourinary: Negative for hematuria.   Neurological: Negative for dizziness and loss of balance.   Psychiatric/Behavioral: Negative for altered mental status and depression.         Objective:   Physical Exam  Constitutional:       Appearance: She is well-developed.   HENT:      Head: Normocephalic and atraumatic.   Eyes:      Conjunctiva/sclera: Conjunctivae normal.   Neck:      Vascular: No carotid bruit or JVD.   Cardiovascular:      Rate and Rhythm: Normal rate and regular rhythm.      Pulses:           Carotid pulses are 2+ on the right side and 2+ on the left side.       Radial pulses are 2+ on the right side and 2+ on the left side.      Heart sounds: Normal heart sounds. No murmur heard.    No friction rub. No gallop.   Pulmonary:      Effort: Pulmonary effort is normal. No respiratory distress.      Breath sounds: Normal breath sounds. No stridor. No wheezing.   Musculoskeletal:      Cervical back: Neck supple.   Skin:     General: Skin is warm and dry.   Neurological:      Mental Status: She is alert and oriented to person, place, and time.   Psychiatric:         Behavior: Behavior normal.         Lab Results    Lab Results   Component Value Date     02/02/2022    K 3.7 02/02/2022     02/02/2022    CO2 32 (H) 02/02/2022    BUN 17 02/02/2022    CREATININE 1.08 02/02/2022     02/02/2022    HGBA1C 5.2 02/15/2016    MG 1.8 02/02/2022    AST 30 02/02/2022    ALT 17 02/02/2022    ALBUMIN 3.8 02/02/2022    PROT 7.1 02/02/2022    BILITOT 0.4 02/02/2022    WBC 5.65 02/02/2022    HGB 10.5 (L) 02/02/2022    HCT 33.1 (L) 02/02/2022    MCV 91 02/02/2022      02/02/2022    INR 1.0 08/17/2016    TSH 2.18 07/09/2021    CHOL 139 07/09/2021    HDL 46 (L) 07/09/2021    LDLCALC 71 07/09/2021    TRIG 134 07/09/2021    CRP 27.6 (H) 09/14/2016       Lipid panel  Lab Results   Component Value Date    CHOL 139 07/09/2021     Lab Results   Component Value Date    HDL 46 (L) 07/09/2021     Lab Results   Component Value Date    LDLCALC 71 07/09/2021     Lab Results   Component Value Date    TRIG 134 07/09/2021       Cardiac Studies  Significant Imaging: Echocardiogram:   2D echo with color flow doppler:   Results for orders placed or performed during the hospital encounter of 01/16/17   2D Echo w/ Color Flow Doppler   Result Value Ref Range    EF + QEF 45 55 - 65    Mitral Valve Regurgitation MODERATE (A)     Diastolic Dysfunction No     Est. PA Systolic Pressure 18.68     Mitral Valve Mobility NORMAL     Tricuspid Valve Regurgitation TRIVIAL TO MILD     Narrative    Date of Procedure: 01/16/2017        TEST DESCRIPTION   Technical Quality: This is a technically adequate study.     Aorta: The aortic root is normal in size, measuring 2.1 cm at sinotubular junction.     Left Atrium: The left atrial volume index is normal, measuring 24.06 cc/m2.     Left Ventricle: The left ventricle is normal in size, with an end-diastolic diameter of 5.2 cm, and an end-systolic diameter of 4.1 cm. LV wall thickness is normal, with the septum measuring 0.6 cm and the posterior wall measuring 0.5 cm across. Relative   wall thickness was normal at 0.19, and the LV mass index was 53.6 g/m2 consistent with normal left ventricular mass. Global left ventricular systolic function appears mildly depressed. Visually estimated ejection fraction is 45-50%. The LV Doppler   derived stroke volume equals 33.0 ccs.   The E/e'(lat) is 10, consistent with normal diastolic function.     Right Atrium: The right atrium is normal in size, measuring 3.3 cm in length in the apical view.     Right Ventricle: The  right ventricle is normal in size measuring 2.3 cm at the base in the apical right ventricle-focused view. Global right ventricular systolic function appears normal. Tricuspid annular plane systolic excursion (TAPSE) is 2.0 cm. The   estimated PA systolic pressure is 19 mmHg.     Aortic Valve:  The aortic valve is normal in structure with normal leaflet mobility.     Mitral Valve:  The mitral valve is normal in structure with normal leaflet mobility. There is moderate mitral regurgitation.     Tricuspid Valve:  The tricuspid valve is normal in structure with normal leaflet mobility. There is trivial to mild tricuspid regurgitation.     Pulmonary Valve:  The pulmonic valve is not well seen.     IVC: IVC is normal in size and collapses > 50% with a sniff, suggesting normal right atrial pressure of 3 mmHg.     Atrial Septum: The atrial septum is intact.     Intracavitary: There is no evidence of pericardial effusion, intracavity mass, thrombi, or vegetation.         CONCLUSIONS     1 - Mildly depressed left ventricular systolic function (EF 45-50%).     2 - Normal left ventricular diastolic function.     3 - Normal right ventricular systolic function .     4 - Moderate mitral regurgitation.             This document has been electronically    SIGNED BY: Chan Solorio MD On: 01/16/2017 11:55    and Transthoracic echo (TTE) complete (Cupid Only):   Results for orders placed or performed during the hospital encounter of 02/11/21   Echo Color Flow Doppler? Yes   Result Value Ref Range    BSA 2.13 m2    LA WIDTH 2.92 cm    AORTIC VALVE CUSP SEPERATION 1.56 cm    PV PEAK VELOCITY 0.73 cm/s    LVIDd 4.04 3.5 - 6.0 cm    IVS 1.23 (A) 0.6 - 1.1 cm    Posterior Wall 1.12 (A) 0.6 - 1.1 cm    Ao root annulus 2.36 cm    LVIDs 3.05 2.1 - 4.0 cm    FS 25 28 - 44 %    IVC proximal 1.7 cm    LA volume 30.88 cm3    LV mass 162.82 g    LA size 3.26 cm    RVDD 2.16 cm    TAPSE 1.37 cm    RV S' 5.65 cm/s    Left Ventricle Relative Wall  Thickness 0.55 cm    AV mean gradient 4 mmHg    AV valve area 2.98 cm2    AV Velocity Ratio 0.76     AV index (prosthetic) 0.84     E/A ratio 0.73     E wave deceleration time 113.94 msec    Pulm vein S/D ratio 1.40     LVOT diameter 2.12 cm    LVOT area 3.5 cm2    LVOT peak bobby 0.95 m/s    LVOT peak VTI 20.04 cm    Ao peak bobby 1.25 m/s    Ao VTI 23.76 cm    Mr max bobby 0.06 m/s    LVOT stroke volume 70.70 cm3    AV peak gradient 6 mmHg    MV Peak E Bobby 0.80 m/s    TR Max Bobby 2.41 m/s    MV Peak A Bobby 1.10 m/s    PV Peak S Bobby 0.42 m/s    PV Peak D Bobby 0.30 m/s    LV Systolic Volume 36.35 mL    LV Systolic Volume Index 17.8 mL/m2    LV Diastolic Volume 71.66 mL    LV Diastolic Volume Index 35.13 mL/m2    LA Volume Index 15.1 mL/m2    LV Mass Index 80 g/m2    RA Major Axis 3.41 cm    Left Atrium Minor Axis 4.00 cm    Left Atrium Major Axis 3.65 cm    Triscuspid Valve Regurgitation Peak Gradient 23 mmHg    LA Volume Index (Mod) 8.1 mL/m2    LA volume (mod) 16.52 cm3    RA Width 2.61 cm    Right Atrial Pressure (from IVC) 3 mmHg    TV rest pulmonary artery pressure 26 mmHg    Narrative    · Mild concentric hypertrophy and low normal systolic function. The   estimated ejection fraction is 50%  · Mild-to-moderate mitral regurgitation.  · Normal left ventricular diastolic function.  · Mild pulmonic regurgitation.  · Mild tricuspid regurgitation.  · Normal right ventricular size with normal right ventricular systolic   function.  · There is pulmonary hypertension.        ECG:  normal EKG, normal sinus rhythm, unchanged from previous tracings.    Cath study :  2016    Angiographic Results     Diagnostic:          Patient has a left dominant coronary artery.        - Left Main Coronary Artery:             The ostial LM is normal. There is ARIELA 3 flow.     - Left Anterior Descending Artery:             The LAD is normal. There is ARIELA 3 flow.     - D1:             The D1 has luminal irregularities. There is ARIELA 3 flow.     -  Ramus:             The proximal ramus has a 60% stenosis. There is ARIELA 3 flow.     - Left Circumflex Artery:             The LCX is normal. There is ARIELA 3 flow.     - Right Coronary Artery:             The proximal RCA has a 75% stenosis. There is ARIELA 3 flow. The remaining portion of the vessel is of small caliber.       Assessment:     1. Morbid (severe) obesity due to excess calories    2. Coronary artery disease involving native coronary artery of native heart with angina pectoris    3. Cardiomyopathy due to hypertension, with heart failure    4. Essential hypertension    5. Coronary artery disease involving native coronary artery of native heart without angina pectoris    6. Moderate mitral insufficiency    7. Obesity (BMI 30-39.9)        Plan:   - EF low normal.   - ContinueToprol to 50  BID  - Continue GDMT  - Diovan, lasix (prn)   - Continue ASA/statin   - Low salt diet  - Repeat Echo     I spent 5-10 minutes asking, assessing, assisting, arranging and advising heart healthy diet improvements. This included low-salt meals, portion control and health food alternatives. I also encourage 30 minutes of moderate exercise 3-4x a week.       Continue with current medical plan and lifestyle changes.  Return sooner for concerns or questions. If symptoms persist go to the ED  Total duration of face to face visit time 30 minutes.  Total time spent counseling greater than fifty percent of total visit time.  Counseling included discussion regarding imaging findings, diagnosis, possibilities, treatment options, risks and benefits.      Orders Placed This Encounter   Procedures    Echo     Standing Status:   Future     Standing Expiration Date:   3/30/2023     Order Specific Question:   Release to patient     Answer:   Immediate       Follow up as scheduled. Return to clinic in 6 months  She expressed verbal understanding and agreed with the plan    Thank you for the opportunity to care for this patient. Will be  available for questions if needed.

## 2022-03-29 RX ORDER — FUROSEMIDE 40 MG/1
40 TABLET ORAL DAILY
Qty: 90 TABLET | Refills: 0 | Status: SHIPPED | OUTPATIENT
Start: 2022-03-29 | End: 2022-04-29 | Stop reason: SDUPTHER

## 2022-03-30 ENCOUNTER — OFFICE VISIT (OUTPATIENT)
Dept: CARDIOLOGY | Facility: CLINIC | Age: 67
End: 2022-03-30
Payer: MEDICARE

## 2022-03-30 VITALS
HEART RATE: 101 BPM | SYSTOLIC BLOOD PRESSURE: 138 MMHG | HEIGHT: 64 IN | OXYGEN SATURATION: 96 % | BODY MASS INDEX: 35.49 KG/M2 | DIASTOLIC BLOOD PRESSURE: 70 MMHG | WEIGHT: 207.88 LBS

## 2022-03-30 DIAGNOSIS — E66.01 MORBID (SEVERE) OBESITY DUE TO EXCESS CALORIES: Primary | ICD-10-CM

## 2022-03-30 DIAGNOSIS — I25.119 CORONARY ARTERY DISEASE INVOLVING NATIVE CORONARY ARTERY OF NATIVE HEART WITH ANGINA PECTORIS: ICD-10-CM

## 2022-03-30 DIAGNOSIS — I43 CARDIOMYOPATHY DUE TO HYPERTENSION, WITH HEART FAILURE: ICD-10-CM

## 2022-03-30 DIAGNOSIS — I11.0 CARDIOMYOPATHY DUE TO HYPERTENSION, WITH HEART FAILURE: ICD-10-CM

## 2022-03-30 DIAGNOSIS — I34.0 MODERATE MITRAL INSUFFICIENCY: ICD-10-CM

## 2022-03-30 DIAGNOSIS — I10 ESSENTIAL HYPERTENSION: ICD-10-CM

## 2022-03-30 DIAGNOSIS — I25.10 CORONARY ARTERY DISEASE INVOLVING NATIVE CORONARY ARTERY OF NATIVE HEART WITHOUT ANGINA PECTORIS: ICD-10-CM

## 2022-03-30 DIAGNOSIS — E66.9 OBESITY (BMI 30-39.9): Chronic | ICD-10-CM

## 2022-03-30 PROCEDURE — 4010F ACE/ARB THERAPY RXD/TAKEN: CPT | Mod: CPTII,S$GLB,, | Performed by: INTERNAL MEDICINE

## 2022-03-30 PROCEDURE — 1159F PR MEDICATION LIST DOCUMENTED IN MEDICAL RECORD: ICD-10-PCS | Mod: CPTII,S$GLB,, | Performed by: INTERNAL MEDICINE

## 2022-03-30 PROCEDURE — 3008F PR BODY MASS INDEX (BMI) DOCUMENTED: ICD-10-PCS | Mod: CPTII,S$GLB,, | Performed by: INTERNAL MEDICINE

## 2022-03-30 PROCEDURE — 1126F AMNT PAIN NOTED NONE PRSNT: CPT | Mod: CPTII,S$GLB,, | Performed by: INTERNAL MEDICINE

## 2022-03-30 PROCEDURE — 1159F MED LIST DOCD IN RCRD: CPT | Mod: CPTII,S$GLB,, | Performed by: INTERNAL MEDICINE

## 2022-03-30 PROCEDURE — 1101F PT FALLS ASSESS-DOCD LE1/YR: CPT | Mod: CPTII,S$GLB,, | Performed by: INTERNAL MEDICINE

## 2022-03-30 PROCEDURE — 99214 PR OFFICE/OUTPT VISIT, EST, LEVL IV, 30-39 MIN: ICD-10-PCS | Mod: S$GLB,,, | Performed by: INTERNAL MEDICINE

## 2022-03-30 PROCEDURE — 3288F PR FALLS RISK ASSESSMENT DOCUMENTED: ICD-10-PCS | Mod: CPTII,S$GLB,, | Performed by: INTERNAL MEDICINE

## 2022-03-30 PROCEDURE — 3075F PR MOST RECENT SYSTOLIC BLOOD PRESS GE 130-139MM HG: ICD-10-PCS | Mod: CPTII,S$GLB,, | Performed by: INTERNAL MEDICINE

## 2022-03-30 PROCEDURE — 3288F FALL RISK ASSESSMENT DOCD: CPT | Mod: CPTII,S$GLB,, | Performed by: INTERNAL MEDICINE

## 2022-03-30 PROCEDURE — 3078F DIAST BP <80 MM HG: CPT | Mod: CPTII,S$GLB,, | Performed by: INTERNAL MEDICINE

## 2022-03-30 PROCEDURE — 3008F BODY MASS INDEX DOCD: CPT | Mod: CPTII,S$GLB,, | Performed by: INTERNAL MEDICINE

## 2022-03-30 PROCEDURE — 3066F NEPHROPATHY DOC TX: CPT | Mod: CPTII,S$GLB,, | Performed by: INTERNAL MEDICINE

## 2022-03-30 PROCEDURE — 99214 OFFICE O/P EST MOD 30 MIN: CPT | Mod: S$GLB,,, | Performed by: INTERNAL MEDICINE

## 2022-03-30 PROCEDURE — 3075F SYST BP GE 130 - 139MM HG: CPT | Mod: CPTII,S$GLB,, | Performed by: INTERNAL MEDICINE

## 2022-03-30 PROCEDURE — 4010F PR ACE/ARB THEARPY RXD/TAKEN: ICD-10-PCS | Mod: CPTII,S$GLB,, | Performed by: INTERNAL MEDICINE

## 2022-03-30 PROCEDURE — 1101F PR PT FALLS ASSESS DOC 0-1 FALLS W/OUT INJ PAST YR: ICD-10-PCS | Mod: CPTII,S$GLB,, | Performed by: INTERNAL MEDICINE

## 2022-03-30 PROCEDURE — 1126F PR PAIN SEVERITY QUANTIFIED, NO PAIN PRESENT: ICD-10-PCS | Mod: CPTII,S$GLB,, | Performed by: INTERNAL MEDICINE

## 2022-03-30 PROCEDURE — 3078F PR MOST RECENT DIASTOLIC BLOOD PRESSURE < 80 MM HG: ICD-10-PCS | Mod: CPTII,S$GLB,, | Performed by: INTERNAL MEDICINE

## 2022-03-30 PROCEDURE — 3066F PR DOCUMENTATION OF TREATMENT FOR NEPHROPATHY: ICD-10-PCS | Mod: CPTII,S$GLB,, | Performed by: INTERNAL MEDICINE

## 2022-03-30 RX ORDER — MECLIZINE HYDROCHLORIDE 25 MG/1
25 TABLET ORAL 3 TIMES DAILY PRN
COMMUNITY
Start: 2021-12-14 | End: 2023-02-23 | Stop reason: ALTCHOICE

## 2022-04-07 ENCOUNTER — HOSPITAL ENCOUNTER (OUTPATIENT)
Dept: CARDIOLOGY | Facility: HOSPITAL | Age: 67
Discharge: HOME | End: 2022-04-07
Attending: INTERNAL MEDICINE
Payer: MEDICARE

## 2022-04-07 VITALS — WEIGHT: 207 LBS | BODY MASS INDEX: 35.34 KG/M2 | HEIGHT: 64 IN

## 2022-04-07 DIAGNOSIS — I43 CARDIOMYOPATHY DUE TO HYPERTENSION, WITH HEART FAILURE: ICD-10-CM

## 2022-04-07 DIAGNOSIS — I11.0 CARDIOMYOPATHY DUE TO HYPERTENSION, WITH HEART FAILURE: ICD-10-CM

## 2022-04-07 LAB
AORTIC ROOT ANNULUS: 2.07 CM
AORTIC VALVE CUSP SEPERATION: 1.61 CM
AV INDEX (PROSTH): 0.81
AV MEAN GRADIENT: 3 MMHG
AV PEAK GRADIENT: 5 MMHG
AV VALVE AREA: 2.51 CM2
AV VELOCITY RATIO: 0.83
BSA FOR ECHO PROCEDURE: 2.06 M2
CV ECHO LV RWT: 0.48 CM
DOP CALC AO PEAK VEL: 1.08 M/S
DOP CALC AO VTI: 20.37 CM
DOP CALC LVOT AREA: 3.1 CM2
DOP CALC LVOT DIAMETER: 1.99 CM
DOP CALC LVOT PEAK VEL: 0.9 M/S
DOP CALC LVOT STROKE VOLUME: 51.14 CM3
DOP CALC MV VTI: 19.17 CM
DOP CALCLVOT PEAK VEL VTI: 16.45 CM
ECHO LV POSTERIOR WALL: 1 CM (ref 0.6–1.1)
EJECTION FRACTION: 55 %
FRACTIONAL SHORTENING: 52 % (ref 28–44)
INTERVENTRICULAR SEPTUM: 1 CM (ref 0.6–1.1)
LA MAJOR: 4.36 CM
LA MINOR: 4.6 CM
LA WIDTH: 3.5 CM
LEFT ATRIUM SIZE: 3.01 CM
LEFT ATRIUM VOLUME INDEX MOD: 17.7 ML/M2
LEFT ATRIUM VOLUME INDEX: 20.2 ML/M2
LEFT ATRIUM VOLUME MOD: 35 CM3
LEFT ATRIUM VOLUME: 40.09 CM3
LEFT INTERNAL DIMENSION IN SYSTOLE: 2 CM (ref 2.1–4)
LEFT VENTRICLE DIASTOLIC VOLUME INDEX: 32.7 ML/M2
LEFT VENTRICLE DIASTOLIC VOLUME: 64.74 ML
LEFT VENTRICLE MASS INDEX: 69 G/M2
LEFT VENTRICLE SYSTOLIC VOLUME INDEX: 15.8 ML/M2
LEFT VENTRICLE SYSTOLIC VOLUME: 31.34 ML
LEFT VENTRICULAR INTERNAL DIMENSION IN DIASTOLE: 4.2 CM (ref 3.5–6)
LEFT VENTRICULAR MASS: 137.25 G
MV A" WAVE DURATION": 9.46 MSEC
MV MEAN GRADIENT: 1 MMHG
MV PEAK GRADIENT: 6 MMHG
MV VALVE AREA BY CONTINUITY EQUATION: 2.67 CM2
PISA MRMAX VEL: 0.06 M/S
PULM VEIN S/D RATIO: 1.25
PV PEAK D VEL: 0.4 M/S
PV PEAK S VEL: 0.5 M/S
PV PEAK VELOCITY: 0.84 CM/S
RA MAJOR: 4 CM
RA PRESSURE: 3 MMHG
RA WIDTH: 3.4 CM
RIGHT VENTRICULAR END-DIASTOLIC DIMENSION: 2.1 CM
RIGHT VENTRICULAR LENGTH IN DIASTOLE (APICAL 4-CHAMBER VIEW): 4.5 CM
RV TISSUE DOPPLER FREE WALL SYSTOLIC VELOCITY 1 (APICAL 4 CHAMBER VIEW): 7.27 CM/S
SINUS: 2.37 CM
TDI LATERAL: 0.08 M/S
TDI SEPTAL: 0.06 M/S
TDI: 0.07 M/S
TRICUSPID ANNULAR PLANE SYSTOLIC EXCURSION: 1.46 CM

## 2022-04-07 PROCEDURE — 93306 ECHO (CUPID ONLY): ICD-10-PCS | Mod: 26,,, | Performed by: INTERNAL MEDICINE

## 2022-04-07 PROCEDURE — 93306 TTE W/DOPPLER COMPLETE: CPT | Mod: 26,,, | Performed by: INTERNAL MEDICINE

## 2022-04-07 PROCEDURE — 93306 TTE W/DOPPLER COMPLETE: CPT | Mod: PO

## 2022-04-08 ENCOUNTER — PATIENT MESSAGE (OUTPATIENT)
Dept: CARDIOLOGY | Facility: CLINIC | Age: 67
End: 2022-04-08
Payer: MEDICARE

## 2022-04-08 NOTE — PROGRESS NOTES
Please let her know the Echo results is ok. Heart pumping function is strong. Mild to moderate leaky valve nothing to worry about. Will continue to monitor. Thanks

## 2022-04-29 RX ORDER — ATORVASTATIN CALCIUM 40 MG/1
TABLET, FILM COATED ORAL
Qty: 90 TABLET | Refills: 0 | Status: SHIPPED | OUTPATIENT
Start: 2022-04-29 | End: 2022-07-25

## 2022-04-29 RX ORDER — FUROSEMIDE 40 MG/1
40 TABLET ORAL DAILY
Qty: 90 TABLET | Refills: 0 | Status: SHIPPED | OUTPATIENT
Start: 2022-04-29 | End: 2022-07-01

## 2022-08-02 LAB
ALBUMIN SERPL-MCNC: 3.7 G/DL (ref 3.6–5.1)
ALBUMIN/CREAT UR: 1 MCG/MG CREAT
ALBUMIN/GLOB SERPL: 1.4 (CALC) (ref 1–2.5)
ALP SERPL-CCNC: 63 U/L (ref 37–153)
ALT SERPL-CCNC: 12 U/L (ref 6–29)
AST SERPL-CCNC: 17 U/L (ref 10–35)
BASOPHILS # BLD AUTO: 79 CELLS/UL (ref 0–200)
BASOPHILS NFR BLD AUTO: 1.3 %
BILIRUB SERPL-MCNC: 0.4 MG/DL (ref 0.2–1.2)
BUN SERPL-MCNC: 19 MG/DL (ref 7–25)
BUN/CREAT SERPL: 18 (CALC) (ref 6–22)
CALCIUM SERPL-MCNC: 9 MG/DL (ref 8.6–10.4)
CHLORIDE SERPL-SCNC: 105 MMOL/L (ref 98–110)
CHOLEST SERPL-MCNC: 145 MG/DL
CHOLEST/HDLC SERPL: 3.4 (CALC)
CO2 SERPL-SCNC: 31 MMOL/L (ref 20–32)
CREAT SERPL-MCNC: 1.07 MG/DL (ref 0.5–1.05)
CREAT UR-MCNC: 197 MG/DL (ref 20–275)
EGFR: 57 ML/MIN/1.73M2
EOSINOPHIL # BLD AUTO: 549 CELLS/UL (ref 15–500)
EOSINOPHIL NFR BLD AUTO: 9 %
ERYTHROCYTE [DISTWIDTH] IN BLOOD BY AUTOMATED COUNT: 13.9 % (ref 11–15)
GLOBULIN SER CALC-MCNC: 2.7 G/DL (CALC) (ref 1.9–3.7)
GLUCOSE SERPL-MCNC: 95 MG/DL (ref 65–99)
HCT VFR BLD AUTO: 34 % (ref 35–45)
HDLC SERPL-MCNC: 43 MG/DL
HGB BLD-MCNC: 11.1 G/DL (ref 11.7–15.5)
LDLC SERPL CALC-MCNC: 75 MG/DL (CALC)
LYMPHOCYTES # BLD AUTO: 1983 CELLS/UL (ref 850–3900)
LYMPHOCYTES NFR BLD AUTO: 32.5 %
MAGNESIUM SERPL-MCNC: 1.7 MG/DL (ref 1.5–2.5)
MCH RBC QN AUTO: 29.3 PG (ref 27–33)
MCHC RBC AUTO-ENTMCNC: 32.6 G/DL (ref 32–36)
MCV RBC AUTO: 89.7 FL (ref 80–100)
MICROALBUMIN UR-MCNC: 0.2 MG/DL
MONOCYTES # BLD AUTO: 317 CELLS/UL (ref 200–950)
MONOCYTES NFR BLD AUTO: 5.2 %
NEUTROPHILS # BLD AUTO: 3172 CELLS/UL (ref 1500–7800)
NEUTROPHILS NFR BLD AUTO: 52 %
NONHDLC SERPL-MCNC: 102 MG/DL (CALC)
PHOSPHATE SERPL-MCNC: 3.7 MG/DL (ref 2.1–4.3)
PLATELET # BLD AUTO: 212 THOUSAND/UL (ref 140–400)
PMV BLD REES-ECKER: 10.2 FL (ref 7.5–12.5)
POTASSIUM SERPL-SCNC: 3.2 MMOL/L (ref 3.5–5.3)
PROT SERPL-MCNC: 6.4 G/DL (ref 6.1–8.1)
RBC # BLD AUTO: 3.79 MILLION/UL (ref 3.8–5.1)
SODIUM SERPL-SCNC: 144 MMOL/L (ref 135–146)
TRIGL SERPL-MCNC: 178 MG/DL
TSH SERPL-ACNC: 1.6 MIU/L (ref 0.4–4.5)
URATE SERPL-MCNC: 5.8 MG/DL (ref 2.5–7)
WBC # BLD AUTO: 6.1 THOUSAND/UL (ref 3.8–10.8)

## 2022-08-18 ENCOUNTER — OFFICE VISIT (OUTPATIENT)
Dept: NEPHROLOGY | Facility: CLINIC | Age: 67
End: 2022-08-18
Payer: MEDICARE

## 2022-08-18 VITALS
WEIGHT: 208.13 LBS | DIASTOLIC BLOOD PRESSURE: 76 MMHG | SYSTOLIC BLOOD PRESSURE: 125 MMHG | HEIGHT: 64 IN | RESPIRATION RATE: 18 BRPM | HEART RATE: 98 BPM | BODY MASS INDEX: 35.53 KG/M2

## 2022-08-18 DIAGNOSIS — N18.2 ANEMIA IN STAGE 2 CHRONIC KIDNEY DISEASE: ICD-10-CM

## 2022-08-18 DIAGNOSIS — D63.1 ANEMIA IN STAGE 2 CHRONIC KIDNEY DISEASE: ICD-10-CM

## 2022-08-18 DIAGNOSIS — I10 ESSENTIAL HYPERTENSION: ICD-10-CM

## 2022-08-18 DIAGNOSIS — M06.9 RHEUMATOID ARTHRITIS OF BOTH WRISTS, UNSPECIFIED WHETHER RHEUMATOID FACTOR PRESENT: ICD-10-CM

## 2022-08-18 DIAGNOSIS — N18.2 CKD (CHRONIC KIDNEY DISEASE) STAGE 2, GFR 60-89 ML/MIN: Primary | ICD-10-CM

## 2022-08-18 PROCEDURE — 3066F PR DOCUMENTATION OF TREATMENT FOR NEPHROPATHY: ICD-10-PCS | Mod: CPTII,S$GLB,, | Performed by: INTERNAL MEDICINE

## 2022-08-18 PROCEDURE — 1126F AMNT PAIN NOTED NONE PRSNT: CPT | Mod: CPTII,S$GLB,, | Performed by: INTERNAL MEDICINE

## 2022-08-18 PROCEDURE — 1101F PR PT FALLS ASSESS DOC 0-1 FALLS W/OUT INJ PAST YR: ICD-10-PCS | Mod: CPTII,S$GLB,, | Performed by: INTERNAL MEDICINE

## 2022-08-18 PROCEDURE — 3074F PR MOST RECENT SYSTOLIC BLOOD PRESSURE < 130 MM HG: ICD-10-PCS | Mod: CPTII,S$GLB,, | Performed by: INTERNAL MEDICINE

## 2022-08-18 PROCEDURE — 1101F PT FALLS ASSESS-DOCD LE1/YR: CPT | Mod: CPTII,S$GLB,, | Performed by: INTERNAL MEDICINE

## 2022-08-18 PROCEDURE — 99213 OFFICE O/P EST LOW 20 MIN: CPT | Mod: S$GLB,,, | Performed by: INTERNAL MEDICINE

## 2022-08-18 PROCEDURE — 3288F FALL RISK ASSESSMENT DOCD: CPT | Mod: CPTII,S$GLB,, | Performed by: INTERNAL MEDICINE

## 2022-08-18 PROCEDURE — 3078F DIAST BP <80 MM HG: CPT | Mod: CPTII,S$GLB,, | Performed by: INTERNAL MEDICINE

## 2022-08-18 PROCEDURE — 99213 PR OFFICE/OUTPT VISIT, EST, LEVL III, 20-29 MIN: ICD-10-PCS | Mod: S$GLB,,, | Performed by: INTERNAL MEDICINE

## 2022-08-18 PROCEDURE — 3074F SYST BP LT 130 MM HG: CPT | Mod: CPTII,S$GLB,, | Performed by: INTERNAL MEDICINE

## 2022-08-18 PROCEDURE — 99999 PR PBB SHADOW E&M-EST. PATIENT-LVL IV: ICD-10-PCS | Mod: PBBFAC,,, | Performed by: INTERNAL MEDICINE

## 2022-08-18 PROCEDURE — 1126F PR PAIN SEVERITY QUANTIFIED, NO PAIN PRESENT: ICD-10-PCS | Mod: CPTII,S$GLB,, | Performed by: INTERNAL MEDICINE

## 2022-08-18 PROCEDURE — 3288F PR FALLS RISK ASSESSMENT DOCUMENTED: ICD-10-PCS | Mod: CPTII,S$GLB,, | Performed by: INTERNAL MEDICINE

## 2022-08-18 PROCEDURE — 3061F PR NEG MICROALBUMINURIA RESULT DOCUMENTED/REVIEW: ICD-10-PCS | Mod: CPTII,S$GLB,, | Performed by: INTERNAL MEDICINE

## 2022-08-18 PROCEDURE — 3078F PR MOST RECENT DIASTOLIC BLOOD PRESSURE < 80 MM HG: ICD-10-PCS | Mod: CPTII,S$GLB,, | Performed by: INTERNAL MEDICINE

## 2022-08-18 PROCEDURE — 3008F PR BODY MASS INDEX (BMI) DOCUMENTED: ICD-10-PCS | Mod: CPTII,S$GLB,, | Performed by: INTERNAL MEDICINE

## 2022-08-18 PROCEDURE — 3008F BODY MASS INDEX DOCD: CPT | Mod: CPTII,S$GLB,, | Performed by: INTERNAL MEDICINE

## 2022-08-18 PROCEDURE — 3061F NEG MICROALBUMINURIA REV: CPT | Mod: CPTII,S$GLB,, | Performed by: INTERNAL MEDICINE

## 2022-08-18 PROCEDURE — 1159F MED LIST DOCD IN RCRD: CPT | Mod: CPTII,S$GLB,, | Performed by: INTERNAL MEDICINE

## 2022-08-18 PROCEDURE — 4010F PR ACE/ARB THEARPY RXD/TAKEN: ICD-10-PCS | Mod: CPTII,S$GLB,, | Performed by: INTERNAL MEDICINE

## 2022-08-18 PROCEDURE — 3066F NEPHROPATHY DOC TX: CPT | Mod: CPTII,S$GLB,, | Performed by: INTERNAL MEDICINE

## 2022-08-18 PROCEDURE — 4010F ACE/ARB THERAPY RXD/TAKEN: CPT | Mod: CPTII,S$GLB,, | Performed by: INTERNAL MEDICINE

## 2022-08-18 PROCEDURE — 99999 PR PBB SHADOW E&M-EST. PATIENT-LVL IV: CPT | Mod: PBBFAC,,, | Performed by: INTERNAL MEDICINE

## 2022-08-18 PROCEDURE — 1159F PR MEDICATION LIST DOCUMENTED IN MEDICAL RECORD: ICD-10-PCS | Mod: CPTII,S$GLB,, | Performed by: INTERNAL MEDICINE

## 2022-08-18 NOTE — PROGRESS NOTES
Name: Christina Tomlinson  Medical Record Number: [id number]  Date of Service: 08/18/2022  Note By: Frederic Patrick,     [facility] Nephrology Consult    Reason for Consultation: Renal Failure  Referring Provider: Dr. Castillo     History of Present Illness:  Christina Tomlinson is a 65 y.o. female with past medical history of CKD 3 who presents for follow-up.  Without c/o CP, SOB, N/V, C/F. No foam or blood in urine, no dysuria. No EUSEBIO, LOS, LOT. Sts had COVID vaccine. Sts had booster shot today. Denies any stone passage, flank pain.       Past Medical History:  Past Medical History:   Diagnosis Date    Arthritis     Cardiomyopathy     Coronary artery disease     GERD (gastroesophageal reflux disease)     Hypertension        Past Surgical History:  Past Surgical History:   Procedure Laterality Date    CHOLECYSTECTOMY      COLONOSCOPY N/A 2/1/2016    Procedure: COLONOSCOPY;  Surgeon: Aidan Reynoso Jr., MD;  Location: [facility];  Service: Endoscopy;  Laterality: N/A;    CYSTOSCOPY  10/5/2018    Procedure: CYSTOSCOPY;  Surgeon: Everette Quiroz MD;  Location: [address];  Service: Urology;;    HYSTERECTOMY      JOINT REPLACEMENT Right     TKA    KNEE ARTHROSCOPY      LASER LITHOTRIPSY Left 10/5/2018    Procedure: LITHOTRIPSY, USING LASER;  Surgeon: Everette Quiroz MD;  Location: [address];  Service: Urology;  Laterality: Left;    OTHER SURGICAL HISTORY      PARATHYROIDECTOMY      RETROGRADE PYELOGRAPHY Left 10/5/2018    Procedure: PYELOGRAM, RETROGRADE;  Surgeon: Everette Quiroz MD;  Location: [address];  Service: Urology;  Laterality: Left;    TONSILLECTOMY      URETEROSCOPY Left 10/5/2018    Procedure: URETEROSCOPY;  Surgeon: Everette Quiroz MD;  Location: [address];  Service: Urology;  Laterality: Left;  90 min       Family History:  Family History   Problem Relation Age of Onset    Diabetes Father     Kidney disease Father     Heart failure Mother     Heart disease  Mother        Social History:  Social History     Socioeconomic History    Marital status:    Tobacco Use    Smoking status: Never Smoker    Smokeless tobacco: Never Used   Substance and Sexual Activity    Alcohol use: No     Alcohol/week: 0.0 standard drinks    Drug use: No    Sexual activity: Not Currently       Home Medications:   (Not in a hospital admission)      Allergies:  Lisinopril and Prochlorperazine    Review of Systems:  10 point review of systems was conducted and was negative except as mentioned in the HPI.    Physical Exam:  Vitals:  Vitals:    08/18/22 1312   BP: 125/76   Pulse: 98   Resp: 18     [unfilled]      Exam  General: No acute distress, well groomed, alert and oriented x 3  HEENT: Normocephalic, atraumatic, EOM's intact bilaterally, external inspection of ears and nose normal, moist mucous membranes, no oral ulcerations/lesions  Neck: Supple, symmetrical, trachea midline, no thyromegaly, no JVD  Respiratory: Clear to auscultation bilaterally, respirations unlabored, no rales/rhonchi/wheezing  Cardiovacular: Regular rate and rhythm, S1, S2 normal, no murmurs, rubs or gallops  Gastrointestinal: Soft, non-tender, bowel sounds normal, no hepatosplenomegaly  Musculoskeletal: No knee or ankle joint tenderness or swelling.   Extremities: No clubbing or cyanosis of bilateral upper extremities; no lower extremity edema bilaterally, radial pulses 2+ bilaterally, symmetric  Skin: warm and dry; no rash on exposed skin  Neurologic: CN grossly intact. No asterixis    Labs:  A1C:      CBC:  Recent Labs   Lab 09/30/21  0842 02/02/22  1443 08/01/22  0814   WBC 6.74 5.65 6.1   RBC 3.69 L 3.62 L 3.79 L   Hemoglobin 10.7 L 10.5 L 11.1 L   Hematocrit 33.7 L 33.1 L 34.0 L   Platelets 239 219 212   MCV 91 91 89.7   MCH 29.0 29.0 29.3   MCHC 31.8 L 31.7 L 32.6     CMP:  Recent Labs   Lab 02/02/22  1443 08/01/22  0814   Glucose 108 95   Calcium 9.2 9.0   Albumin 3.8 3.7   Total Protein 7.1 6.4    Sodium 140 144   Potassium 3.7 3.2 L   CO2 32 H 31   Chloride 101 105   BUN 17 19   Creatinine 1.08 1.07 H   Alkaline Phosphatase 68  --    ALT 17 12   AST 30 17   Total Bilirubin 0.4 0.4   eGFR if African American >60.0  --      LIPIDS:  Recent Labs   Lab 02/11/21  1033 07/09/21  1204 08/01/22  0814   TSH 1.210 2.18 1.60   HDL  --  46 L 43 L   Cholesterol  --  139 145   Triglycerides  --  134 178 H   LDL Cholesterol  --  71 75   HDL/Cholesterol Ratio  --  3.0 3.4   Non HDL Chol. (LDL+VLDL)  --  93 102     TSH:  Recent Labs   Lab 02/11/21  1033 07/09/21  1204 08/01/22  0814   TSH 1.210 2.18 1.60     URINALYSIS:  No results for input(s): COLORU, CLARITYU, SPECGRAV, PHUR, PROTEINUA, GLUCOSEU, BILIRUBINCON, BLOODU, WBCU, RBCU, BACTERIA, MUCUS, NITRITE, LEUKOCYTESUR, UROBILINOGEN, HYALINECASTS in the last 2160 hours.     Lab Results   Component Value Date    WBC 6.1 08/01/2022    HGB 11.1 (L) 08/01/2022    HCT 34.0 (L) 08/01/2022     08/01/2022    K 3.2 (L) 08/01/2022     08/01/2022    CO2 31 08/01/2022    BUN 19 08/01/2022    CREATININE 1.07 (H) 08/01/2022    EGFRNONAA 53.2 (A) 02/02/2022    CALCIUM 9.0 08/01/2022    PHOS 3.9 02/02/2022    MG 1.7 08/01/2022    ALBUMIN 3.7 08/01/2022    AST 17 08/01/2022    ALT 12 08/01/2022       No results found for: EXTANC, EXTWBC, EXTSEGS, EXTPLATELETS, EXTHEMOGLOBI, EXTHEMATOCRI, EXTCREATININ, EXTSODIUM, EXTPOTASSIUM, EXTBUN, EXTCO2, EXTCALCIUM, EXTPHOSPHORU, EXTGLUCOSE, EXTALBUMIN, EXTAST, EXTALT, EXTBILITOTAL, EXTLIPASE, EXTAMYLASE    No results found for: EXTCYCLOSLVL, EXTSIROLIMUS, EXTTACROLVL, EXTPROTCRE, EXTPTHINTACT, EXTPROTEINUA, EXTWBCUA, EXTRBCUA    Imaging Studies:   CAPRI Carlson Sr., MD  [phone] 7/27/2021 Routine     Narrative & Impression  EXAMINATION:  US KIDNEY     CLINICAL HISTORY:  Essential (primary) hypertension     TECHNIQUE:  Multiple static ultrasound images are submitted for interpretation with color flow and spectral Doppler  imaging.     COMPARISON:  01/08/2020     FINDINGS:  The right kidney measures 11.2 cm in length.  It has arterial flow with a peak systolic velocity of 47 centimeters/second and a diastolic velocity of 18 centimeters/second.  It has a resistive index of 0.62.  The left kidney measures 9.5 cm in length.  It has arterial flow with a peak systolic velocity of 46 centimeters/second and a diastolic velocity of 18 centimeters/second.  It has a resistive index of 0.61.  There is no hydronephrosis, nephrolithiasis, or abnormal perinephric fluid collection.     Impression:     The right kidney is larger than the left kidney.  Otherwise, normal study.        Electronically signed by: Raz Carlson MD  Date:                                            07/27/2021  Time:                                           08:55    ?    Assessment/Plan:  65 y.o. female with:     1- CKD 2- Renal function/GFR is much better.  Avoid dehydration and NSAIDS.  She is non-oliguric. She has trace proteinuria. Hx of PTHx for PHPTH in 2015.     2. Hypertension - is well controlled. Sts is well controlled at home.      3. Rheumatoid arthritis - F/U with Dr. Frye. Avoid NSAIDs or other known nephrotoxic medications. Sts having (L) hand pain. Will see Dr. Frye on Monday.     4. Coronary artery disease - F/U with Dr. Talamantes in [place].     5. Anemia - drop in H/H noted. Encourage iron intake but MCV is in normal range.     6. Glaucoma - Using eyedrops Brimonidine tartrate.        Frederic Patrick,   [facility]

## 2022-10-18 ENCOUNTER — IMMUNIZATION (OUTPATIENT)
Dept: INTERNAL MEDICINE | Facility: CLINIC | Age: 67
End: 2022-10-18
Payer: MEDICARE

## 2022-10-18 PROCEDURE — G0008 ADMIN INFLUENZA VIRUS VAC: HCPCS | Mod: S$GLB,,, | Performed by: INTERNAL MEDICINE

## 2022-10-18 PROCEDURE — 90694 FLU VACCINE - QUADRIVALENT - ADJUVANTED: ICD-10-PCS | Mod: S$GLB,,, | Performed by: INTERNAL MEDICINE

## 2022-10-18 PROCEDURE — G0008 FLU VACCINE - QUADRIVALENT - ADJUVANTED: ICD-10-PCS | Mod: S$GLB,,, | Performed by: INTERNAL MEDICINE

## 2022-10-18 PROCEDURE — 90694 VACC AIIV4 NO PRSRV 0.5ML IM: CPT | Mod: S$GLB,,, | Performed by: INTERNAL MEDICINE

## 2022-11-23 ENCOUNTER — OFFICE VISIT (OUTPATIENT)
Dept: CARDIOLOGY | Facility: CLINIC | Age: 67
End: 2022-11-23
Payer: MEDICARE

## 2022-11-23 VITALS
DIASTOLIC BLOOD PRESSURE: 69 MMHG | WEIGHT: 207 LBS | OXYGEN SATURATION: 96 % | HEIGHT: 64 IN | BODY MASS INDEX: 35.34 KG/M2 | SYSTOLIC BLOOD PRESSURE: 129 MMHG | HEART RATE: 94 BPM

## 2022-11-23 DIAGNOSIS — I43 CARDIOMYOPATHY DUE TO HYPERTENSION, WITH HEART FAILURE: Primary | ICD-10-CM

## 2022-11-23 DIAGNOSIS — E66.9 OBESITY (BMI 30-39.9): Chronic | ICD-10-CM

## 2022-11-23 DIAGNOSIS — I25.10 CORONARY ARTERY DISEASE INVOLVING NATIVE CORONARY ARTERY OF NATIVE HEART WITHOUT ANGINA PECTORIS: ICD-10-CM

## 2022-11-23 DIAGNOSIS — I10 ESSENTIAL HYPERTENSION: ICD-10-CM

## 2022-11-23 DIAGNOSIS — I34.0 MODERATE MITRAL INSUFFICIENCY: ICD-10-CM

## 2022-11-23 DIAGNOSIS — I11.0 CARDIOMYOPATHY DUE TO HYPERTENSION, WITH HEART FAILURE: Primary | ICD-10-CM

## 2022-11-23 PROCEDURE — 99214 PR OFFICE/OUTPT VISIT, EST, LEVL IV, 30-39 MIN: ICD-10-PCS | Mod: S$GLB,,, | Performed by: INTERNAL MEDICINE

## 2022-11-23 PROCEDURE — 1159F PR MEDICATION LIST DOCUMENTED IN MEDICAL RECORD: ICD-10-PCS | Mod: CPTII,S$GLB,, | Performed by: INTERNAL MEDICINE

## 2022-11-23 PROCEDURE — 3078F PR MOST RECENT DIASTOLIC BLOOD PRESSURE < 80 MM HG: ICD-10-PCS | Mod: CPTII,S$GLB,, | Performed by: INTERNAL MEDICINE

## 2022-11-23 PROCEDURE — 4010F ACE/ARB THERAPY RXD/TAKEN: CPT | Mod: CPTII,S$GLB,, | Performed by: INTERNAL MEDICINE

## 2022-11-23 PROCEDURE — 3066F NEPHROPATHY DOC TX: CPT | Mod: CPTII,S$GLB,, | Performed by: INTERNAL MEDICINE

## 2022-11-23 PROCEDURE — 1126F AMNT PAIN NOTED NONE PRSNT: CPT | Mod: CPTII,S$GLB,, | Performed by: INTERNAL MEDICINE

## 2022-11-23 PROCEDURE — 3008F PR BODY MASS INDEX (BMI) DOCUMENTED: ICD-10-PCS | Mod: CPTII,S$GLB,, | Performed by: INTERNAL MEDICINE

## 2022-11-23 PROCEDURE — 3078F DIAST BP <80 MM HG: CPT | Mod: CPTII,S$GLB,, | Performed by: INTERNAL MEDICINE

## 2022-11-23 PROCEDURE — 1160F PR REVIEW ALL MEDS BY PRESCRIBER/CLIN PHARMACIST DOCUMENTED: ICD-10-PCS | Mod: CPTII,S$GLB,, | Performed by: INTERNAL MEDICINE

## 2022-11-23 PROCEDURE — 3061F PR NEG MICROALBUMINURIA RESULT DOCUMENTED/REVIEW: ICD-10-PCS | Mod: CPTII,S$GLB,, | Performed by: INTERNAL MEDICINE

## 2022-11-23 PROCEDURE — 3074F PR MOST RECENT SYSTOLIC BLOOD PRESSURE < 130 MM HG: ICD-10-PCS | Mod: CPTII,S$GLB,, | Performed by: INTERNAL MEDICINE

## 2022-11-23 PROCEDURE — 1126F PR PAIN SEVERITY QUANTIFIED, NO PAIN PRESENT: ICD-10-PCS | Mod: CPTII,S$GLB,, | Performed by: INTERNAL MEDICINE

## 2022-11-23 PROCEDURE — 3061F NEG MICROALBUMINURIA REV: CPT | Mod: CPTII,S$GLB,, | Performed by: INTERNAL MEDICINE

## 2022-11-23 PROCEDURE — 4010F PR ACE/ARB THEARPY RXD/TAKEN: ICD-10-PCS | Mod: CPTII,S$GLB,, | Performed by: INTERNAL MEDICINE

## 2022-11-23 PROCEDURE — 1160F RVW MEDS BY RX/DR IN RCRD: CPT | Mod: CPTII,S$GLB,, | Performed by: INTERNAL MEDICINE

## 2022-11-23 PROCEDURE — 3066F PR DOCUMENTATION OF TREATMENT FOR NEPHROPATHY: ICD-10-PCS | Mod: CPTII,S$GLB,, | Performed by: INTERNAL MEDICINE

## 2022-11-23 PROCEDURE — 99214 OFFICE O/P EST MOD 30 MIN: CPT | Mod: S$GLB,,, | Performed by: INTERNAL MEDICINE

## 2022-11-23 PROCEDURE — 3008F BODY MASS INDEX DOCD: CPT | Mod: CPTII,S$GLB,, | Performed by: INTERNAL MEDICINE

## 2022-11-23 PROCEDURE — 1159F MED LIST DOCD IN RCRD: CPT | Mod: CPTII,S$GLB,, | Performed by: INTERNAL MEDICINE

## 2022-11-23 PROCEDURE — 3074F SYST BP LT 130 MM HG: CPT | Mod: CPTII,S$GLB,, | Performed by: INTERNAL MEDICINE

## 2022-11-23 NOTE — PROGRESS NOTES
Subjective:   Patient ID:  Christina Tomlinson is a 67 y.o. female who presents for evaluation of cardiomyopathy    HPI:   Here for follow up   She stated that she has been doing ok,  She exercise 3 times a week at [facility] with no issues  No chest pain, no palpitations  No LE edema, no orthopnea.   Compliant with her medications        Hx of cardiomyopathy with LVEF ~ 45  9/18/19  Ef improved to 45% so not needing AICD.     Pt was previously seen by Dr. Solorio/Dr. Mckeon .    CATH 6/2016  Angiographic Results     Diagnostic:          Patient has a left dominant coronary artery.        - Left Main Coronary Artery:             The ostial LM is normal. There is ARIELA 3 flow.     - Left Anterior Descending Artery:             The LAD is normal. There is ARIELA 3 flow.     - D1:             The D1 has luminal irregularities. There is ARIELA 3 flow.     - Ramus:             The proximal ramus has a 60% stenosis. There is ARIELA 3 flow.     - Left Circumflex Artery:             The LCX is normal. There is ARIELA 3 flow.     - Right Coronary Artery:             The proximal RCA has a 75% stenosis. There is ARIELA 3 flow. The remaining portion of the vessel is of small caliber.    TTE 4/2022  The left ventricle is normal in size with concentric remodeling and normal systolic function.  The estimated ejection fraction is 55%.  Normal left ventricular diastolic function.  Normal right ventricular size with normal right ventricular systolic function.  Mild-to-moderate mitral regurgitation.  Normal central venous pressure (3 mmHg).  TTE 2/11/2021  Mild concentric hypertrophy and low normal systolic function. The estimated ejection fraction is 50%  Mild-to-moderate mitral regurgitation.  Normal left ventricular diastolic function.  Mild pulmonic regurgitation.  Mild tricuspid regurgitation.  Normal right ventricular size with normal right ventricular systolic function.  There is pulmonary hypertension.    TTE 1/2017      Mildly  depressed left ventricular systolic function (EF 45-50%).     2 - Normal left ventricular diastolic function.     3 - Normal right ventricular systolic function .     4 - Moderate mitral regurgitation.     DEVAN 2016    Moderately depressed left ventricular systolic function (EF 35-40%).     2 - Mild to moderate mitral regurgitation.     3 - Left atrial appendage is single lobed.     4 - Left atrium is enlarged.     5 - Grade 1 atheroma disease of aorta.        Holter Monitor 2021  Sinus rhythm HR  bpm (av bpm)  Very rare PACs, PVCs  Diary not returned to correlate any symptoms    Patient Active Problem List    Diagnosis Date Noted    Morbid (severe) obesity due to excess calories 2022    Coronary artery disease involving native coronary artery of native heart with angina pectoris 2022    Obesity (BMI 30-39.9) 02/10/2021    Chronic pain of both shoulders 2020    Decreased ROM of right shoulder 2020    Decreased ROM of left shoulder 2020    Muscle weakness of left arm 2020    Muscle weakness of right arm 2020    Urolithiasis 10/05/2018    Moderate mitral insufficiency 01/10/2017    Cardiomyopathy 2016    Coronary artery disease involving native coronary artery of native heart without angina pectoris 2016    Cardiomyopathy due to hypertension, with heart failure 2016     L dominant system, LAD, D1, LCx patent, Ramus 60%, small RCA with 75% stenosis, EF 40% with LVEDP 28 mmHg  MR not well visualized, at least moderate      Decreased ROM of right knee 03/15/2016    Weakness of right lower extremity 03/15/2016    Difficulty walking 03/15/2016    Decreased functional mobility 03/15/2016    S/P total knee arthroplasty 2016    Knee pain 2016    Essential hypertension 2016    Anemia of chronic disease 2016    Aftercare following right knee joint replacement surgery 2016    Anemia 2016    Primary  hyperparathyroidism 12/02/2015    Acute rheumatoid arthritis 10/08/2015    Primary osteoarthritis of right knee 09/16/2015    Back pain of thoracolumbar region 06/29/2015    GERD (gastroesophageal reflux disease) 06/29/2015    Arthritis of knee 04/07/2015    Arthritis of hand 04/07/2015    Medial meniscus tear 08/27/2014    Dysphagia, oropharyngeal 09/06/2013    Dysphagia, unspecified(787.20) 09/06/2013     Dx updated per 2019 IMO Load      Breast cancer screening 07/30/2013    Sore throat 01/07/2013    Torn rotator cuff 01/07/2013    Corns and callosities 11/30/2012    Dermatophytosis of nail 11/30/2012    Pain in limb 11/30/2012       Patient's Medications   New Prescriptions    No medications on file   Previous Medications    ACETAMINOPHEN (TYLENOL) 500 MG TABLET    Take 500 mg by mouth every 6 (six) hours as needed for Pain.    AMITRIPTYLINE (ELAVIL) 25 MG TABLET    Take 25 mg by mouth nightly as needed for Insomnia.    ASPIRIN (ECOTRIN) 81 MG EC TABLET    Take 1 tablet (81 mg total) by mouth once. for 1 dose    ATORVASTATIN (LIPITOR) 40 MG TABLET    Take 1 tablet by mouth once daily    BRIMONIDINE 0.15 % OPTH DROP (ALPHAGAN) 0.15 % OPHTHALMIC SOLUTION    1 drop 3 (three) times daily.    CHOLECALCIFEROL, VITAMIN D3, 3,000 UNIT TAB    Take by mouth.    FAMOTIDINE (PEPCID AC ORAL)    Take by mouth.    FERROUS GLUCONATE (FERGON) 240 (27 FE) MG TABLET    Take 480 mg by mouth 3 (three) times daily.    FUROSEMIDE (LASIX) 40 MG TABLET    Take 1 tablet by mouth once daily    HYDROXYCHLOROQUINE (PLAQUENIL) 200 MG TABLET    Take 200 mg by mouth 2 (two) times daily.     LEFLUNOMIDE (ARAVA) 10 MG TAB    Take 20 mg by mouth once daily.    MECLIZINE (ANTIVERT) 25 MG TABLET    Take 25 mg by mouth 3 (three) times daily as needed.    METOPROLOL SUCCINATE (TOPROL-XL) 50 MG 24 HR TABLET    Take 1 tablet (50 mg total) by mouth 2 (two) times daily.    VALSARTAN (DIOVAN) 320 MG TABLET    Take 1 tablet (320 mg total) by mouth once  daily.   Modified Medications    No medications on file   Discontinued Medications    No medications on file         Review of Systems   Constitutional: Negative for chills and fever.   HENT:  Negative for hearing loss and nosebleeds.    Eyes:  Negative for blurred vision.   Cardiovascular:  Negative for chest pain, leg swelling and palpitations.   Respiratory:  Negative for hemoptysis and shortness of breath.    Hematologic/Lymphatic: Negative for bleeding problem.   Skin:  Negative for itching.   Musculoskeletal:  Negative for falls.   Gastrointestinal:  Negative for abdominal pain and hematochezia.   Genitourinary:  Negative for hematuria.   Neurological:  Negative for dizziness and loss of balance.   Psychiatric/Behavioral:  Negative for altered mental status and depression.        Objective:   Physical Exam  Constitutional:       Appearance: She is well-developed.   HENT:      Head: Normocephalic and atraumatic.   Eyes:      Conjunctiva/sclera: Conjunctivae normal.   Neck:      Vascular: No carotid bruit or JVD.   Cardiovascular:      Rate and Rhythm: Normal rate and regular rhythm.      Pulses:           Carotid pulses are 2+ on the right side and 2+ on the left side.       Radial pulses are 2+ on the right side and 2+ on the left side.      Heart sounds: Normal heart sounds. No murmur heard.    No friction rub. No gallop.   Pulmonary:      Effort: Pulmonary effort is normal. No respiratory distress.      Breath sounds: Normal breath sounds. No stridor. No wheezing.   Musculoskeletal:      Cervical back: Neck supple.   Skin:     General: Skin is warm and dry.   Neurological:      Mental Status: She is alert and oriented to person, place, and time.   Psychiatric:         Behavior: Behavior normal.       Lab Results    Lab Results   Component Value Date     08/01/2022    K 3.2 (L) 08/01/2022     08/01/2022    CO2 31 08/01/2022    BUN 19 08/01/2022    CREATININE 1.07 (H) 08/01/2022    GLU 95  08/01/2022    HGBA1C 5.2 02/15/2016    MG 1.7 08/01/2022    AST 17 08/01/2022    ALT 12 08/01/2022    ALBUMIN 3.7 08/01/2022    PROT 6.4 08/01/2022    BILITOT 0.4 08/01/2022    WBC 6.1 08/01/2022    HGB 11.1 (L) 08/01/2022    HCT 34.0 (L) 08/01/2022    MCV 89.7 08/01/2022     08/01/2022    INR 1.0 08/17/2016    TSH 1.60 08/01/2022    CHOL 145 08/01/2022    HDL 43 (L) 08/01/2022    LDLCALC 75 08/01/2022    TRIG 178 (H) 08/01/2022    CRP 27.6 (H) 09/14/2016       Lipid panel  Lab Results   Component Value Date    CHOL 145 08/01/2022     Lab Results   Component Value Date    HDL 43 (L) 08/01/2022     Lab Results   Component Value Date    LDLCALC 75 08/01/2022     Lab Results   Component Value Date    TRIG 178 (H) 08/01/2022       Cardiac Studies  Significant Imaging: Echocardiogram:   2D echo with color flow doppler:   Results for orders placed or performed during the hospital encounter of 01/16/17   2D Echo w/ Color Flow Doppler   Result Value Ref Range    EF + QEF 45 55 - 65    Mitral Valve Regurgitation MODERATE (A)     Diastolic Dysfunction No     Est. PA Systolic Pressure 18.68     Mitral Valve Mobility NORMAL     Tricuspid Valve Regurgitation TRIVIAL TO MILD     Narrative    Date of Procedure: 01/16/2017        TEST DESCRIPTION   Technical Quality: This is a technically adequate study.     Aorta: The aortic root is normal in size, measuring 2.1 cm at sinotubular junction.     Left Atrium: The left atrial volume index is normal, measuring 24.06 cc/m2.     Left Ventricle: The left ventricle is normal in size, with an end-diastolic diameter of 5.2 cm, and an end-systolic diameter of 4.1 cm. LV wall thickness is normal, with the septum measuring 0.6 cm and the posterior wall measuring 0.5 cm across. Relative   wall thickness was normal at 0.19, and the LV mass index was 53.6 g/m2 consistent with normal left ventricular mass. Global left ventricular systolic function appears mildly depressed. Visually estimated  ejection fraction is 45-50%. The LV Doppler   derived stroke volume equals 33.0 ccs.   The E/e'(lat) is 10, consistent with normal diastolic function.     Right Atrium: The right atrium is normal in size, measuring 3.3 cm in length in the apical view.     Right Ventricle: The right ventricle is normal in size measuring 2.3 cm at the base in the apical right ventricle-focused view. Global right ventricular systolic function appears normal. Tricuspid annular plane systolic excursion (TAPSE) is 2.0 cm. The   estimated PA systolic pressure is 19 mmHg.     Aortic Valve:  The aortic valve is normal in structure with normal leaflet mobility.     Mitral Valve:  The mitral valve is normal in structure with normal leaflet mobility. There is moderate mitral regurgitation.     Tricuspid Valve:  The tricuspid valve is normal in structure with normal leaflet mobility. There is trivial to mild tricuspid regurgitation.     Pulmonary Valve:  The pulmonic valve is not well seen.     IVC: IVC is normal in size and collapses > 50% with a sniff, suggesting normal right atrial pressure of 3 mmHg.     Atrial Septum: The atrial septum is intact.     Intracavitary: There is no evidence of pericardial effusion, intracavity mass, thrombi, or vegetation.         CONCLUSIONS     1 - Mildly depressed left ventricular systolic function (EF 45-50%).     2 - Normal left ventricular diastolic function.     3 - Normal right ventricular systolic function .     4 - Moderate mitral regurgitation.             This document has been electronically    SIGNED BY: Chan Solorio MD On: 01/16/2017 11:55    and Transthoracic echo (TTE) complete (Cupid Only):   Results for orders placed or performed during the hospital encounter of 04/07/22   Echo   Result Value Ref Range    AV mean gradient 3 mmHg    Ao peak leo 1.08 m/s    Ao VTI 20.37 cm    IVS 1.00 (A) 0.6 - 1.1 cm    LA size 3.01 cm    Left Atrium Major Axis 4.36 cm    Left Atrium Minor Axis 4.60 cm     "LVIDd 4.20 3.5 - 6.0 cm    LVIDs 2.00 2.1 - 4.0 cm    LVOT diameter 1.99 cm    LVOT peak VTI 16.45 cm    Posterior Wall 1.00 (A) 0.6 - 1.1 cm    PV Peak D Bobby 0.40 m/s    PV Peak S Bobby 0.50 m/s    RA Major Axis 4.00 cm    RA Width 3.40 cm    RVDD 2.10 cm    Sinus 2.37 cm    TAPSE 1.46 cm    Ao root annulus 2.07 cm    AORTIC VALVE CUSP SEPERATION 1.61 cm    PV PEAK VELOCITY 0.84 cm/s    LV Diastolic Volume 64.74 mL    LV Systolic Volume 31.34 mL    LVOT peak bobby 0.90 m/s    LA WIDTH 3.50 cm    Mr max bobby 0.06 m/s    LA volume (mod) 35.00 cm3    MV "A" wave duration 9.46 msec    MV mean gradient 1 mmHg    MV peak gradient 6 mmHg    RV S' 7.27 cm/s    MV VTI 19.17 cm    TDI LATERAL 0.08 m/s    TDI SEPTAL 0.06 m/s    FS 52 %    LA volume 40.09 cm3    LV mass 137.25 g    Left Ventricle Relative Wall Thickness 0.48 cm    AV valve area 2.51 cm2    AV Velocity Ratio 0.83     AV index (prosthetic) 0.81     MV valve area by continuity eq 2.67 cm2    Mean e' 0.07 m/s    Pulm vein S/D ratio 1.25     LVOT area 3.1 cm2    LVOT stroke volume 51.14 cm3    AV peak gradient 5 mmHg    BSA 2.06 m2    LV Systolic Volume Index 15.8 mL/m2    LV Diastolic Volume Index 32.70 mL/m2    LA Volume Index 20.2 mL/m2    LV Mass Index 69 g/m2    LA Volume Index (Mod) 17.7 mL/m2    Right Atrial Pressure (from IVC) 3 mmHg    EF 55 %    Right ventricular length in diastole (apical 4-chamber view) 4.50 cm    Narrative    · The left ventricle is normal in size with concentric remodeling and   normal systolic function.  · The estimated ejection fraction is 55%.  · Normal left ventricular diastolic function.  · Normal right ventricular size with normal right ventricular systolic   function.  · Mild-to-moderate mitral regurgitation.  · Normal central venous pressure (3 mmHg).        ECG:  normal EKG, normal sinus rhythm, unchanged from previous tracings.    Cath study :  2016    Angiographic Results     Diagnostic:          Patient has a left dominant " coronary artery.        - Left Main Coronary Artery:             The ostial LM is normal. There is ARIELA 3 flow.     - Left Anterior Descending Artery:             The LAD is normal. There is ARIELA 3 flow.     - D1:             The D1 has luminal irregularities. There is ARIELA 3 flow.     - Ramus:             The proximal ramus has a 60% stenosis. There is ARIELA 3 flow.     - Left Circumflex Artery:             The LCX is normal. There is ARIELA 3 flow.     - Right Coronary Artery:             The proximal RCA has a 75% stenosis. There is ARIELA 3 flow. The remaining portion of the vessel is of small caliber.       Assessment:     1. Cardiomyopathy due to hypertension, with heart failure    2. Essential hypertension    3. Coronary artery disease involving native coronary artery of native heart without angina pectoris    4. Moderate mitral insufficiency    5. Obesity (BMI 30-39.9)        Plan:   - EF recovered  - ContinueToprol to 50  BID  - Continue GDMT  - Diovan, lasix (prn)   - Stable CAD / will continue ASA/statin   - LDL close to goal   - Low salt diet  - Will monitor for mild-moderate MR. Clinically doing very well. BP is well controlled. NYHA FC II        I spent 5-10 minutes asking, assessing, assisting, arranging and advising heart healthy diet improvements. This included low-salt meals, portion control and health food alternatives. I also encourage 30 minutes of moderate exercise 3-4x a week.       Continue with current medical plan and lifestyle changes.  Return sooner for concerns or questions. If symptoms persist go to the ED  Total duration of face to face visit time 30 minutes.  Total time spent counseling greater than fifty percent of total visit time.  Counseling included discussion regarding imaging findings, diagnosis, possibilities, treatment options, risks and benefits.      No orders of the defined types were placed in this encounter.      Follow up as scheduled. Return to clinic in 6 months  She  expressed verbal understanding and agreed with the plan    Thank you for the opportunity to care for this patient. Will be available for questions if needed.

## 2022-12-07 RX ORDER — VALSARTAN 320 MG/1
320 TABLET ORAL DAILY
Qty: 90 TABLET | Refills: 3 | Status: CANCELLED | OUTPATIENT
Start: 2022-12-07 | End: 2023-12-07

## 2023-01-07 ENCOUNTER — PATIENT MESSAGE (OUTPATIENT)
Dept: NEPHROLOGY | Facility: CLINIC | Age: 68
End: 2023-01-07
Payer: MEDICARE

## 2023-01-10 ENCOUNTER — TELEPHONE (OUTPATIENT)
Dept: NEPHROLOGY | Facility: CLINIC | Age: 68
End: 2023-01-10
Payer: MEDICARE

## 2023-01-18 RX ORDER — VALSARTAN 320 MG/1
320 TABLET ORAL DAILY
Qty: 90 TABLET | Refills: 3 | Status: CANCELLED | OUTPATIENT
Start: 2023-01-18 | End: 2024-01-18

## 2023-01-27 NOTE — TELEPHONE ENCOUNTER
Former pt of Dr. Mckeon, has an baltazar scheduled to follow up with Dr. Madrigal on 8/10    Pt is currently taking valsartan 160 mg, medication is currently on backorder and needs a new medication sent in to her [facility] pharmacy     Can we send in a new Rx to replace the valsartan 160 mg        absent

## 2023-02-17 LAB
ALBUMIN SERPL-MCNC: 3.7 G/DL (ref 3.6–5.1)
ALBUMIN/GLOB SERPL: 1.3 (CALC) (ref 1–2.5)
ALP SERPL-CCNC: 55 U/L (ref 37–153)
ALT SERPL-CCNC: 11 U/L (ref 6–29)
APPEARANCE UR: CLEAR
AST SERPL-CCNC: 16 U/L (ref 10–35)
BACTERIA #/AREA URNS HPF: ABNORMAL /HPF
BACTERIA UR CULT: NORMAL
BASOPHILS # BLD AUTO: 69 CELLS/UL (ref 0–200)
BASOPHILS NFR BLD AUTO: 1 %
BILIRUB SERPL-MCNC: 0.3 MG/DL (ref 0.2–1.2)
BILIRUB UR QL STRIP: NEGATIVE
BUN SERPL-MCNC: 20 MG/DL (ref 7–25)
BUN/CREAT SERPL: 14 (CALC) (ref 6–22)
CALCIUM SERPL-MCNC: 9.4 MG/DL (ref 8.6–10.4)
CHLORIDE SERPL-SCNC: 103 MMOL/L (ref 98–110)
CO2 SERPL-SCNC: 29 MMOL/L (ref 20–32)
COLOR UR: YELLOW
CREAT SERPL-MCNC: 1.42 MG/DL (ref 0.5–1.05)
EGFR: 40 ML/MIN/1.73M2
EOSINOPHIL # BLD AUTO: 442 CELLS/UL (ref 15–500)
EOSINOPHIL NFR BLD AUTO: 6.4 %
ERYTHROCYTE [DISTWIDTH] IN BLOOD BY AUTOMATED COUNT: 12.9 % (ref 11–15)
GLOBULIN SER CALC-MCNC: 2.8 G/DL (CALC) (ref 1.9–3.7)
GLUCOSE SERPL-MCNC: 90 MG/DL (ref 65–139)
GLUCOSE UR QL STRIP: NEGATIVE
HCT VFR BLD AUTO: 33.1 % (ref 35–45)
HGB BLD-MCNC: 10.6 G/DL (ref 11.7–15.5)
HGB UR QL STRIP: NEGATIVE
HYALINE CASTS #/AREA URNS LPF: ABNORMAL /LPF
KETONES UR QL STRIP: ABNORMAL
LEUKOCYTE ESTERASE UR QL STRIP: ABNORMAL
LYMPHOCYTES # BLD AUTO: 2256 CELLS/UL (ref 850–3900)
LYMPHOCYTES NFR BLD AUTO: 32.7 %
MAGNESIUM SERPL-MCNC: 2.1 MG/DL (ref 1.5–2.5)
MCH RBC QN AUTO: 28.8 PG (ref 27–33)
MCHC RBC AUTO-ENTMCNC: 32 G/DL (ref 32–36)
MCV RBC AUTO: 89.9 FL (ref 80–100)
MONOCYTES # BLD AUTO: 552 CELLS/UL (ref 200–950)
MONOCYTES NFR BLD AUTO: 8 %
NEUTROPHILS # BLD AUTO: 3581 CELLS/UL (ref 1500–7800)
NEUTROPHILS NFR BLD AUTO: 51.9 %
NITRITE UR QL STRIP: NEGATIVE
PH UR STRIP: ABNORMAL [PH] (ref 5–8)
PHOSPHATE SERPL-MCNC: 3.3 MG/DL (ref 2.1–4.3)
PLATELET # BLD AUTO: 225 THOUSAND/UL (ref 140–400)
PMV BLD REES-ECKER: 10.1 FL (ref 7.5–12.5)
POTASSIUM SERPL-SCNC: 4.2 MMOL/L (ref 3.5–5.3)
PROT SERPL-MCNC: 6.5 G/DL (ref 6.1–8.1)
PROT UR QL STRIP: NEGATIVE
RBC # BLD AUTO: 3.68 MILLION/UL (ref 3.8–5.1)
RBC #/AREA URNS HPF: ABNORMAL /HPF
SERVICE CMNT-IMP: ABNORMAL
SODIUM SERPL-SCNC: 139 MMOL/L (ref 135–146)
SP GR UR STRIP: 1.02 (ref 1–1.03)
SQUAMOUS #/AREA URNS HPF: ABNORMAL /HPF
WBC # BLD AUTO: 6.9 THOUSAND/UL (ref 3.8–10.8)
WBC #/AREA URNS HPF: ABNORMAL /HPF

## 2023-02-22 DIAGNOSIS — R22.40 LOCALIZED SWELLING, MASS AND LUMP, LOWER LIMB: Primary | ICD-10-CM

## 2023-02-23 ENCOUNTER — OFFICE VISIT (OUTPATIENT)
Dept: NEPHROLOGY | Facility: CLINIC | Age: 68
End: 2023-02-23
Payer: MEDICARE

## 2023-02-23 VITALS
HEART RATE: 90 BPM | WEIGHT: 208.88 LBS | RESPIRATION RATE: 18 BRPM | HEIGHT: 64 IN | BODY MASS INDEX: 35.66 KG/M2 | SYSTOLIC BLOOD PRESSURE: 133 MMHG | DIASTOLIC BLOOD PRESSURE: 69 MMHG

## 2023-02-23 DIAGNOSIS — D63.1 ANEMIA DUE TO STAGE 3A CHRONIC KIDNEY DISEASE: ICD-10-CM

## 2023-02-23 DIAGNOSIS — N18.32 STAGE 3B CHRONIC KIDNEY DISEASE: Primary | ICD-10-CM

## 2023-02-23 DIAGNOSIS — N18.31 ANEMIA DUE TO STAGE 3A CHRONIC KIDNEY DISEASE: ICD-10-CM

## 2023-02-23 DIAGNOSIS — I10 ESSENTIAL HYPERTENSION: ICD-10-CM

## 2023-02-23 PROCEDURE — 1159F MED LIST DOCD IN RCRD: CPT | Mod: CPTII,S$GLB,, | Performed by: INTERNAL MEDICINE

## 2023-02-23 PROCEDURE — 3066F PR DOCUMENTATION OF TREATMENT FOR NEPHROPATHY: ICD-10-PCS | Mod: CPTII,S$GLB,, | Performed by: INTERNAL MEDICINE

## 2023-02-23 PROCEDURE — 3075F PR MOST RECENT SYSTOLIC BLOOD PRESS GE 130-139MM HG: ICD-10-PCS | Mod: CPTII,S$GLB,, | Performed by: INTERNAL MEDICINE

## 2023-02-23 PROCEDURE — 3066F NEPHROPATHY DOC TX: CPT | Mod: CPTII,S$GLB,, | Performed by: INTERNAL MEDICINE

## 2023-02-23 PROCEDURE — 1126F AMNT PAIN NOTED NONE PRSNT: CPT | Mod: CPTII,S$GLB,, | Performed by: INTERNAL MEDICINE

## 2023-02-23 PROCEDURE — 3075F SYST BP GE 130 - 139MM HG: CPT | Mod: CPTII,S$GLB,, | Performed by: INTERNAL MEDICINE

## 2023-02-23 PROCEDURE — 4010F PR ACE/ARB THEARPY RXD/TAKEN: ICD-10-PCS | Mod: CPTII,S$GLB,, | Performed by: INTERNAL MEDICINE

## 2023-02-23 PROCEDURE — 1160F RVW MEDS BY RX/DR IN RCRD: CPT | Mod: CPTII,S$GLB,, | Performed by: INTERNAL MEDICINE

## 2023-02-23 PROCEDURE — 3078F PR MOST RECENT DIASTOLIC BLOOD PRESSURE < 80 MM HG: ICD-10-PCS | Mod: CPTII,S$GLB,, | Performed by: INTERNAL MEDICINE

## 2023-02-23 PROCEDURE — 99999 PR PBB SHADOW E&M-EST. PATIENT-LVL III: ICD-10-PCS | Mod: PBBFAC,,, | Performed by: INTERNAL MEDICINE

## 2023-02-23 PROCEDURE — 1101F PR PT FALLS ASSESS DOC 0-1 FALLS W/OUT INJ PAST YR: ICD-10-PCS | Mod: CPTII,S$GLB,, | Performed by: INTERNAL MEDICINE

## 2023-02-23 PROCEDURE — 3288F PR FALLS RISK ASSESSMENT DOCUMENTED: ICD-10-PCS | Mod: CPTII,S$GLB,, | Performed by: INTERNAL MEDICINE

## 2023-02-23 PROCEDURE — 99214 PR OFFICE/OUTPT VISIT, EST, LEVL IV, 30-39 MIN: ICD-10-PCS | Mod: S$GLB,,, | Performed by: INTERNAL MEDICINE

## 2023-02-23 PROCEDURE — 3008F BODY MASS INDEX DOCD: CPT | Mod: CPTII,S$GLB,, | Performed by: INTERNAL MEDICINE

## 2023-02-23 PROCEDURE — 99214 OFFICE O/P EST MOD 30 MIN: CPT | Mod: S$GLB,,, | Performed by: INTERNAL MEDICINE

## 2023-02-23 PROCEDURE — 3078F DIAST BP <80 MM HG: CPT | Mod: CPTII,S$GLB,, | Performed by: INTERNAL MEDICINE

## 2023-02-23 PROCEDURE — 1160F PR REVIEW ALL MEDS BY PRESCRIBER/CLIN PHARMACIST DOCUMENTED: ICD-10-PCS | Mod: CPTII,S$GLB,, | Performed by: INTERNAL MEDICINE

## 2023-02-23 PROCEDURE — 1159F PR MEDICATION LIST DOCUMENTED IN MEDICAL RECORD: ICD-10-PCS | Mod: CPTII,S$GLB,, | Performed by: INTERNAL MEDICINE

## 2023-02-23 PROCEDURE — 4010F ACE/ARB THERAPY RXD/TAKEN: CPT | Mod: CPTII,S$GLB,, | Performed by: INTERNAL MEDICINE

## 2023-02-23 PROCEDURE — 3288F FALL RISK ASSESSMENT DOCD: CPT | Mod: CPTII,S$GLB,, | Performed by: INTERNAL MEDICINE

## 2023-02-23 PROCEDURE — 99999 PR PBB SHADOW E&M-EST. PATIENT-LVL III: CPT | Mod: PBBFAC,,, | Performed by: INTERNAL MEDICINE

## 2023-02-23 PROCEDURE — 3008F PR BODY MASS INDEX (BMI) DOCUMENTED: ICD-10-PCS | Mod: CPTII,S$GLB,, | Performed by: INTERNAL MEDICINE

## 2023-02-23 PROCEDURE — 1101F PT FALLS ASSESS-DOCD LE1/YR: CPT | Mod: CPTII,S$GLB,, | Performed by: INTERNAL MEDICINE

## 2023-02-23 PROCEDURE — 1126F PR PAIN SEVERITY QUANTIFIED, NO PAIN PRESENT: ICD-10-PCS | Mod: CPTII,S$GLB,, | Performed by: INTERNAL MEDICINE

## 2023-02-23 NOTE — PROGRESS NOTES
Name: Christina Tomlinson  Medical Record Number: [id number]  Date of Service: 02/23/2023  Note By: Frederic Patrick DO    [facility] Consult    Reason for Consultation: Renal Failure  Referring Provider: Dr. Castillo     History of Present Illness:  Christina Tomlinson is a 67 y.o. female with past medical history of CKD 3 who presents for follow-up.  Without c/o CP, Palpitations, SOB, REYNA, N/V, C/F. No foam or blood in urine, no dysuria. No EUSEBIO, LOS, LOT. Sts had COVID vaccine. Sts had booster shot today. Denies any stone passage, flank pain.     History of CKD3  Nephrologist RADHA Patrick  Access n/a?  Dialysis center n/a?    Past Medical History:  Past Medical History:   Diagnosis Date    Arthritis     Cardiomyopathy     Coronary artery disease     GERD (gastroesophageal reflux disease)     Hypertension        Past Surgical History:  Past Surgical History:   Procedure Laterality Date    CHOLECYSTECTOMY      COLONOSCOPY N/A 2/1/2016    Procedure: COLONOSCOPY;  Surgeon: Aidan Reynoso Jr., MD;  Location: [facility];  Service: Endoscopy;  Laterality: N/A;    CYSTOSCOPY  10/5/2018    Procedure: CYSTOSCOPY;  Surgeon: Everette Quiroz MD;  Location: [address];  Service: Urology;;    HYSTERECTOMY      JOINT REPLACEMENT Right     TKA    KNEE ARTHROSCOPY      LASER LITHOTRIPSY Left 10/5/2018    Procedure: LITHOTRIPSY, USING LASER;  Surgeon: Everette Quiroz MD;  Location: [address];  Service: Urology;  Laterality: Left;    OTHER SURGICAL HISTORY      PARATHYROIDECTOMY      RETROGRADE PYELOGRAPHY Left 10/5/2018    Procedure: PYELOGRAM, RETROGRADE;  Surgeon: Everette Quiroz MD;  Location: [address];  Service: Urology;  Laterality: Left;    TONSILLECTOMY      URETEROSCOPY Left 10/5/2018    Procedure: URETEROSCOPY;  Surgeon: Everette Quiroz MD;  Location: [address];  Service: Urology;  Laterality: Left;  90 min       Family History:  Family History   Problem Relation Age of Onset    Diabetes  Father     Kidney disease Father     Heart failure Mother     Heart disease Mother        Social History:  Social History     Socioeconomic History    Marital status:    Tobacco Use    Smoking status: Never    Smokeless tobacco: Never   Substance and Sexual Activity    Alcohol use: No     Alcohol/week: 0.0 standard drinks    Drug use: No    Sexual activity: Not Currently       Home Medications:   (Not in a hospital admission)      Allergies:  Lisinopril and Prochlorperazine    Review of Systems:  10 point review of systems was conducted and was negative except as mentioned in the HPI.    Physical Exam:  Vitals:  There were no vitals filed for this visit.  [unfilled]      Exam  General: No acute distress, well groomed, alert and oriented x 3  HEENT: Normocephalic, atraumatic, EOM's intact bilaterally, external inspection of ears and nose normal, moist mucous membranes, no oral ulcerations/lesions  Neck: Supple, symmetrical, trachea midline, no thyromegaly, no JVD  Respiratory: Clear to auscultation bilaterally, respirations unlabored, no rales/rhonchi/wheezing  Cardiovacular: Regular rate and rhythm, S1, S2 normal, no murmurs, rubs or gallops  Gastrointestinal: Soft, non-tender, bowel sounds normal, no hepatosplenomegaly  Musculoskeletal: No knee or ankle joint tenderness or swelling.   Extremities: No clubbing or cyanosis of bilateral upper extremities; no lower extremity edema bilaterally, radial pulses 2+ bilaterally, symmetric  Skin: warm and dry; no rash on exposed skin  Neurologic: CN grossly intact. No asterixis.    Labs:  A1C:      CBC:  Recent Labs   Lab 02/02/22  1443 08/01/22  0814 02/15/23  1403   WBC 5.65 6.1 6.9   RBC 3.62 L 3.79 L 3.68 L   Hemoglobin 10.5 L 11.1 L 10.6 L   Hematocrit 33.1 L 34.0 L 33.1 L   Platelets 219 212 225   MCV 91 89.7 89.9   MCH 29.0 29.3 28.8   MCHC 31.7 L 32.6 32.0     CMP:  Recent Labs   Lab 02/02/22  1443 08/01/22  0814 02/15/23  1403   Glucose 108   < > 90   Calcium  9.2   < > 9.4   Albumin 3.8   < > 3.7   Total Protein 7.1   < > 6.5   Sodium 140   < > 139   Potassium 3.7   < > 4.2   CO2 32 H   < > 29   Chloride 101   < > 103   BUN 17   < > 20   Creatinine 1.08   < > 1.42 H   Alkaline Phosphatase 68  --   --    ALT 17   < > 11   AST 30   < > 16   Total Bilirubin 0.4   < > 0.3   eGFR if  >60.0  --   --     < > = values in this interval not displayed.     LIPIDS:  Recent Labs   Lab 02/11/21  1033 07/09/21  1204 08/01/22  0814   TSH 1.210 2.18 1.60   HDL  --  46 L 43 L   Cholesterol  --  139 145   Triglycerides  --  134 178 H   LDL Cholesterol  --  71 75   HDL/Cholesterol Ratio  --  3.0 3.4   Non HDL Chol. (LDL+VLDL)  --  93 102     TSH:  Recent Labs   Lab 02/11/21  1033 07/09/21  1204 08/01/22  0814   TSH 1.210 2.18 1.60     URINALYSIS:  Recent Labs   Lab Result Units 02/15/23  1403   Color, UA  YELLOW   Specific Gravity, UA  1.021   pH, UA  < OR = 5.0   Protein, UA  NEGATIVE   Glucose, UA  NEGATIVE   Bacteria, UA /HPF MODERATE*   Nitrite, UA  NEGATIVE   Leukocytes, UA  TRACE*   Hyaline Casts, UA /LPF NONE SEEN        Lab Results   Component Value Date    WBC 6.9 02/15/2023    HGB 10.6 (L) 02/15/2023    HCT 33.1 (L) 02/15/2023     02/15/2023    K 4.2 02/15/2023     02/15/2023    CO2 29 02/15/2023    BUN 20 02/15/2023    CREATININE 1.42 (H) 02/15/2023    EGFRNONAA 53.2 (A) 02/02/2022    CALCIUM 9.4 02/15/2023    PHOS 3.9 02/02/2022    MG 2.1 02/15/2023    ALBUMIN 3.7 02/15/2023    AST 16 02/15/2023    ALT 11 02/15/2023       No results found for: EXTANC, EXTWBC, EXTSEGS, EXTPLATELETS, EXTHEMOGLOBI, EXTHEMATOCRI, EXTCREATININ, EXTSODIUM, EXTPOTASSIUM, EXTBUN, EXTCO2, EXTCALCIUM, EXTPHOSPHORU, EXTGLUCOSE, EXTALBUMIN, EXTAST, EXTALT, EXTBILITOTAL, EXTLIPASE, EXTAMYLASE    No results found for: EXTCYCLOSLVL, EXTSIROLIMUS, EXTTACROLVL, EXTPROTCRE, EXTPTHINTACT, EXTPROTEINUA, EXTWBCUA, EXTRBCUA    Imaging Studies: n/a  ?    Assessment/Plan:  65 y.o. female  with:     1- CKD 3 - Renal function/GFR is worse and GFR  has dropped to 40 ml/min.  Avoid dehydration and NSAIDS.  She is non-oliguric. She has trace proteinuria. Hx of PTHx for PHPTH in 2015.     2. Hypertension - is well controlled. Sts is well controlled at home.      3. Rheumatoid arthritis - F/U with Dr. Frye. Avoid NSAIDs or other known nephrotoxic medications. Sts having (L) hand pain. Will see Dr. Frye on Monday.     4. Coronary artery disease - F/U with Dr. Talamantes in [place].     5. Anemia - Stable H/H noted. Encourage iron intake but MCV is dropping in normal range.     6. Glaucoma - Using eyedrops Brimonidine tartrate.        Frederic Patrick,   [facility]

## 2023-03-03 ENCOUNTER — OFFICE VISIT (OUTPATIENT)
Dept: SURGERY | Facility: CLINIC | Age: 68
End: 2023-03-03
Payer: MEDICARE

## 2023-03-03 VITALS
HEART RATE: 105 BPM | DIASTOLIC BLOOD PRESSURE: 67 MMHG | HEIGHT: 64 IN | BODY MASS INDEX: 35.23 KG/M2 | WEIGHT: 206.38 LBS | SYSTOLIC BLOOD PRESSURE: 140 MMHG

## 2023-03-03 DIAGNOSIS — L02.416 ABSCESS OF SKIN OF LEFT HIP: Primary | ICD-10-CM

## 2023-03-03 PROCEDURE — 3078F PR MOST RECENT DIASTOLIC BLOOD PRESSURE < 80 MM HG: ICD-10-PCS | Mod: CPTII,S$GLB,, | Performed by: STUDENT IN AN ORGANIZED HEALTH CARE EDUCATION/TRAINING PROGRAM

## 2023-03-03 PROCEDURE — 99999 PR PBB SHADOW E&M-EST. PATIENT-LVL IV: ICD-10-PCS | Mod: PBBFAC,,, | Performed by: STUDENT IN AN ORGANIZED HEALTH CARE EDUCATION/TRAINING PROGRAM

## 2023-03-03 PROCEDURE — 99204 PR OFFICE/OUTPT VISIT, NEW, LEVL IV, 45-59 MIN: ICD-10-PCS | Mod: 25,S$GLB,, | Performed by: STUDENT IN AN ORGANIZED HEALTH CARE EDUCATION/TRAINING PROGRAM

## 2023-03-03 PROCEDURE — 3288F PR FALLS RISK ASSESSMENT DOCUMENTED: ICD-10-PCS | Mod: CPTII,S$GLB,, | Performed by: STUDENT IN AN ORGANIZED HEALTH CARE EDUCATION/TRAINING PROGRAM

## 2023-03-03 PROCEDURE — 3077F SYST BP >= 140 MM HG: CPT | Mod: CPTII,S$GLB,, | Performed by: STUDENT IN AN ORGANIZED HEALTH CARE EDUCATION/TRAINING PROGRAM

## 2023-03-03 PROCEDURE — 3288F FALL RISK ASSESSMENT DOCD: CPT | Mod: CPTII,S$GLB,, | Performed by: STUDENT IN AN ORGANIZED HEALTH CARE EDUCATION/TRAINING PROGRAM

## 2023-03-03 PROCEDURE — 3077F PR MOST RECENT SYSTOLIC BLOOD PRESSURE >= 140 MM HG: ICD-10-PCS | Mod: CPTII,S$GLB,, | Performed by: STUDENT IN AN ORGANIZED HEALTH CARE EDUCATION/TRAINING PROGRAM

## 2023-03-03 PROCEDURE — 1159F MED LIST DOCD IN RCRD: CPT | Mod: CPTII,S$GLB,, | Performed by: STUDENT IN AN ORGANIZED HEALTH CARE EDUCATION/TRAINING PROGRAM

## 2023-03-03 PROCEDURE — 10060 I&D ABSCESS SIMPLE/SINGLE: CPT | Mod: S$GLB,,, | Performed by: STUDENT IN AN ORGANIZED HEALTH CARE EDUCATION/TRAINING PROGRAM

## 2023-03-03 PROCEDURE — 1125F PR PAIN SEVERITY QUANTIFIED, PAIN PRESENT: ICD-10-PCS | Mod: CPTII,S$GLB,, | Performed by: STUDENT IN AN ORGANIZED HEALTH CARE EDUCATION/TRAINING PROGRAM

## 2023-03-03 PROCEDURE — 3008F BODY MASS INDEX DOCD: CPT | Mod: CPTII,S$GLB,, | Performed by: STUDENT IN AN ORGANIZED HEALTH CARE EDUCATION/TRAINING PROGRAM

## 2023-03-03 PROCEDURE — 3078F DIAST BP <80 MM HG: CPT | Mod: CPTII,S$GLB,, | Performed by: STUDENT IN AN ORGANIZED HEALTH CARE EDUCATION/TRAINING PROGRAM

## 2023-03-03 PROCEDURE — 1101F PR PT FALLS ASSESS DOC 0-1 FALLS W/OUT INJ PAST YR: ICD-10-PCS | Mod: CPTII,S$GLB,, | Performed by: STUDENT IN AN ORGANIZED HEALTH CARE EDUCATION/TRAINING PROGRAM

## 2023-03-03 PROCEDURE — 99204 OFFICE O/P NEW MOD 45 MIN: CPT | Mod: 25,S$GLB,, | Performed by: STUDENT IN AN ORGANIZED HEALTH CARE EDUCATION/TRAINING PROGRAM

## 2023-03-03 PROCEDURE — 1101F PT FALLS ASSESS-DOCD LE1/YR: CPT | Mod: CPTII,S$GLB,, | Performed by: STUDENT IN AN ORGANIZED HEALTH CARE EDUCATION/TRAINING PROGRAM

## 2023-03-03 PROCEDURE — 1160F RVW MEDS BY RX/DR IN RCRD: CPT | Mod: CPTII,S$GLB,, | Performed by: STUDENT IN AN ORGANIZED HEALTH CARE EDUCATION/TRAINING PROGRAM

## 2023-03-03 PROCEDURE — 3066F NEPHROPATHY DOC TX: CPT | Mod: CPTII,S$GLB,, | Performed by: STUDENT IN AN ORGANIZED HEALTH CARE EDUCATION/TRAINING PROGRAM

## 2023-03-03 PROCEDURE — 4010F ACE/ARB THERAPY RXD/TAKEN: CPT | Mod: CPTII,S$GLB,, | Performed by: STUDENT IN AN ORGANIZED HEALTH CARE EDUCATION/TRAINING PROGRAM

## 2023-03-03 PROCEDURE — 10060 PR DRAIN SKIN ABSCESS SIMPLE: ICD-10-PCS | Mod: S$GLB,,, | Performed by: STUDENT IN AN ORGANIZED HEALTH CARE EDUCATION/TRAINING PROGRAM

## 2023-03-03 PROCEDURE — 1125F AMNT PAIN NOTED PAIN PRSNT: CPT | Mod: CPTII,S$GLB,, | Performed by: STUDENT IN AN ORGANIZED HEALTH CARE EDUCATION/TRAINING PROGRAM

## 2023-03-03 PROCEDURE — 3066F PR DOCUMENTATION OF TREATMENT FOR NEPHROPATHY: ICD-10-PCS | Mod: CPTII,S$GLB,, | Performed by: STUDENT IN AN ORGANIZED HEALTH CARE EDUCATION/TRAINING PROGRAM

## 2023-03-03 PROCEDURE — 1160F PR REVIEW ALL MEDS BY PRESCRIBER/CLIN PHARMACIST DOCUMENTED: ICD-10-PCS | Mod: CPTII,S$GLB,, | Performed by: STUDENT IN AN ORGANIZED HEALTH CARE EDUCATION/TRAINING PROGRAM

## 2023-03-03 PROCEDURE — 3008F PR BODY MASS INDEX (BMI) DOCUMENTED: ICD-10-PCS | Mod: CPTII,S$GLB,, | Performed by: STUDENT IN AN ORGANIZED HEALTH CARE EDUCATION/TRAINING PROGRAM

## 2023-03-03 PROCEDURE — 99999 PR PBB SHADOW E&M-EST. PATIENT-LVL IV: CPT | Mod: PBBFAC,,, | Performed by: STUDENT IN AN ORGANIZED HEALTH CARE EDUCATION/TRAINING PROGRAM

## 2023-03-03 PROCEDURE — 1159F PR MEDICATION LIST DOCUMENTED IN MEDICAL RECORD: ICD-10-PCS | Mod: CPTII,S$GLB,, | Performed by: STUDENT IN AN ORGANIZED HEALTH CARE EDUCATION/TRAINING PROGRAM

## 2023-03-03 PROCEDURE — 4010F PR ACE/ARB THEARPY RXD/TAKEN: ICD-10-PCS | Mod: CPTII,S$GLB,, | Performed by: STUDENT IN AN ORGANIZED HEALTH CARE EDUCATION/TRAINING PROGRAM

## 2023-03-03 NOTE — PROGRESS NOTES
Subjective:      Christina Tomlinson is a 67 y.o. year old female who presents to the general surgery Clinic on 03/03/2023 for left hip possible abscess. She states it started a week ago as redness and swelling. It was also painful. She eventually developed a blister of the area as well.  She went to the ED for evaluation apparently they bedside US it and did not feel as if there was a fluid collection. The did open up the blister int he ER and was but on Bactrim. She notes it still drains some per day. Still is painful. She denies fever. She denies any trauma or inciting event to the area.     Vitals:    03/03/23 1450   BP: (!) 140/67   Pulse: 105        Patient Active Problem List   Diagnosis    Corns and callosities    Dermatophytosis of nail    Pain in limb    Sore throat    Torn rotator cuff    Breast cancer screening    Dysphagia, oropharyngeal    Dysphagia, unspecified(787.20)    Medial meniscus tear    Arthritis of knee    Arthritis of hand    Back pain of thoracolumbar region    GERD (gastroesophageal reflux disease)    Primary osteoarthritis of right knee    Acute rheumatoid arthritis    Primary hyperparathyroidism    Anemia    Aftercare following right knee joint replacement surgery    Essential hypertension    Anemia of chronic disease    Knee pain    S/P total knee arthroplasty    Decreased ROM of right knee    Weakness of right lower extremity    Difficulty walking    Decreased functional mobility    Cardiomyopathy due to hypertension, with heart failure    Cardiomyopathy    Coronary artery disease involving native coronary artery of native heart without angina pectoris    Moderate mitral insufficiency    Urolithiasis    Chronic pain of both shoulders    Decreased ROM of right shoulder    Decreased ROM of left shoulder    Muscle weakness of left arm    Muscle weakness of right arm    Obesity (BMI 30-39.9)    Morbid (severe) obesity due to excess calories    Coronary artery disease involving native  coronary artery of native heart with angina pectoris       Current Outpatient Medications   Medication Sig Dispense Refill    acetaminophen (TYLENOL) 500 MG tablet Take 500 mg by mouth every 6 (six) hours as needed for Pain.      amitriptyline (ELAVIL) 25 MG tablet Take 25 mg by mouth nightly as needed for Insomnia.      aspirin (ECOTRIN) 81 MG EC tablet Take 1 tablet (81 mg total) by mouth once. for 1 dose 90 tablet 3    atorvastatin (LIPITOR) 40 MG tablet Take 1 tablet by mouth once daily 90 tablet 0    brimonidine 0.15 % OPTH DROP (ALPHAGAN) 0.15 % ophthalmic solution 1 drop 3 (three) times daily.      cholecalciferol, vitamin D3, 3,000 unit Tab Take by mouth.      famotidine (PEPCID AC ORAL) Take by mouth.      ferrous gluconate (FERGON) 240 (27 FE) MG tablet Take 480 mg by mouth 3 (three) times daily.      furosemide (LASIX) 40 MG tablet Take 1 tablet by mouth once daily 90 tablet 0    hydroxychloroquine (PLAQUENIL) 200 mg tablet Take 200 mg by mouth 2 (two) times daily.       leflunomide (ARAVA) 10 MG Tab Take 20 mg by mouth once daily.      metoprolol succinate (TOPROL-XL) 50 MG 24 hr tablet Take 1 tablet by mouth twice daily 180 tablet 0    valsartan (DIOVAN) 320 MG tablet Take 1 tablet (320 mg total) by mouth once daily. 90 tablet 3     No current facility-administered medications for this visit.       Review of Systems:  See HPI  Objective:     Physical Exam:  General: No acute distress  HEENT: atraumatic  Resp: No resp distress, effort normal, normal chest movements   Cardiac: Normal rate  Abdomen: Soft, NT, ND  Skin: warm and dry. Left hip with area of erythema and tenderness. Area where prior blister was opened up healing with some drainage.   Neuro: AOX4, at baseline  Psych: Behavior normal       Assessment:         Plan:     67F with left hip cellulitis with abscess    I/D in clinic  Discussed wound care instructions  F/u in a week for wound check       General Surgery Bedside Procedure         Procedure: Left Hip I/D          Patient consented for I/D of left hip      Left hip prepped   5 cc 1% lidocaine injected locally to skin   11 blade used to open abscess  Copious amount of purulent fluid expressed   Irrigated wound   Wound packed with gauze  Patient tolerated well.

## 2023-03-10 ENCOUNTER — PATIENT MESSAGE (OUTPATIENT)
Dept: SURGERY | Facility: CLINIC | Age: 68
End: 2023-03-10
Payer: MEDICARE

## 2023-03-13 ENCOUNTER — OFFICE VISIT (OUTPATIENT)
Dept: CARDIOLOGY | Facility: CLINIC | Age: 68
End: 2023-03-13
Payer: MEDICARE

## 2023-03-13 VITALS
HEIGHT: 64 IN | OXYGEN SATURATION: 94 % | BODY MASS INDEX: 34.94 KG/M2 | DIASTOLIC BLOOD PRESSURE: 65 MMHG | SYSTOLIC BLOOD PRESSURE: 105 MMHG | HEART RATE: 91 BPM | WEIGHT: 204.63 LBS

## 2023-03-13 DIAGNOSIS — I25.119 CORONARY ARTERY DISEASE INVOLVING NATIVE CORONARY ARTERY OF NATIVE HEART WITH ANGINA PECTORIS: ICD-10-CM

## 2023-03-13 DIAGNOSIS — E66.9 OBESITY (BMI 30-39.9): Chronic | ICD-10-CM

## 2023-03-13 DIAGNOSIS — I25.10 CORONARY ARTERY DISEASE INVOLVING NATIVE CORONARY ARTERY OF NATIVE HEART WITHOUT ANGINA PECTORIS: ICD-10-CM

## 2023-03-13 DIAGNOSIS — I11.0 CARDIOMYOPATHY DUE TO HYPERTENSION, WITH HEART FAILURE: ICD-10-CM

## 2023-03-13 DIAGNOSIS — I10 ESSENTIAL HYPERTENSION: Primary | ICD-10-CM

## 2023-03-13 DIAGNOSIS — I34.0 MODERATE MITRAL INSUFFICIENCY: ICD-10-CM

## 2023-03-13 DIAGNOSIS — I43 CARDIOMYOPATHY DUE TO HYPERTENSION, WITH HEART FAILURE: ICD-10-CM

## 2023-03-13 PROCEDURE — 4010F PR ACE/ARB THEARPY RXD/TAKEN: ICD-10-PCS | Mod: CPTII,S$GLB,, | Performed by: INTERNAL MEDICINE

## 2023-03-13 PROCEDURE — 99999 PR PBB SHADOW E&M-EST. PATIENT-LVL III: ICD-10-PCS | Mod: PBBFAC,,, | Performed by: INTERNAL MEDICINE

## 2023-03-13 PROCEDURE — 1125F AMNT PAIN NOTED PAIN PRSNT: CPT | Mod: CPTII,S$GLB,, | Performed by: INTERNAL MEDICINE

## 2023-03-13 PROCEDURE — 99214 OFFICE O/P EST MOD 30 MIN: CPT | Mod: S$GLB,,, | Performed by: INTERNAL MEDICINE

## 2023-03-13 PROCEDURE — 3288F FALL RISK ASSESSMENT DOCD: CPT | Mod: CPTII,S$GLB,, | Performed by: INTERNAL MEDICINE

## 2023-03-13 PROCEDURE — 99999 PR PBB SHADOW E&M-EST. PATIENT-LVL III: CPT | Mod: PBBFAC,,, | Performed by: INTERNAL MEDICINE

## 2023-03-13 PROCEDURE — 99214 PR OFFICE/OUTPT VISIT, EST, LEVL IV, 30-39 MIN: ICD-10-PCS | Mod: S$GLB,,, | Performed by: INTERNAL MEDICINE

## 2023-03-13 PROCEDURE — 1159F PR MEDICATION LIST DOCUMENTED IN MEDICAL RECORD: ICD-10-PCS | Mod: CPTII,S$GLB,, | Performed by: INTERNAL MEDICINE

## 2023-03-13 PROCEDURE — 4010F ACE/ARB THERAPY RXD/TAKEN: CPT | Mod: CPTII,S$GLB,, | Performed by: INTERNAL MEDICINE

## 2023-03-13 PROCEDURE — 3008F PR BODY MASS INDEX (BMI) DOCUMENTED: ICD-10-PCS | Mod: CPTII,S$GLB,, | Performed by: INTERNAL MEDICINE

## 2023-03-13 PROCEDURE — 3066F PR DOCUMENTATION OF TREATMENT FOR NEPHROPATHY: ICD-10-PCS | Mod: CPTII,S$GLB,, | Performed by: INTERNAL MEDICINE

## 2023-03-13 PROCEDURE — 1159F MED LIST DOCD IN RCRD: CPT | Mod: CPTII,S$GLB,, | Performed by: INTERNAL MEDICINE

## 2023-03-13 PROCEDURE — 1101F PT FALLS ASSESS-DOCD LE1/YR: CPT | Mod: CPTII,S$GLB,, | Performed by: INTERNAL MEDICINE

## 2023-03-13 PROCEDURE — 3074F SYST BP LT 130 MM HG: CPT | Mod: CPTII,S$GLB,, | Performed by: INTERNAL MEDICINE

## 2023-03-13 PROCEDURE — 3078F PR MOST RECENT DIASTOLIC BLOOD PRESSURE < 80 MM HG: ICD-10-PCS | Mod: CPTII,S$GLB,, | Performed by: INTERNAL MEDICINE

## 2023-03-13 PROCEDURE — 1125F PR PAIN SEVERITY QUANTIFIED, PAIN PRESENT: ICD-10-PCS | Mod: CPTII,S$GLB,, | Performed by: INTERNAL MEDICINE

## 2023-03-13 PROCEDURE — 3008F BODY MASS INDEX DOCD: CPT | Mod: CPTII,S$GLB,, | Performed by: INTERNAL MEDICINE

## 2023-03-13 PROCEDURE — 3066F NEPHROPATHY DOC TX: CPT | Mod: CPTII,S$GLB,, | Performed by: INTERNAL MEDICINE

## 2023-03-13 PROCEDURE — 3288F PR FALLS RISK ASSESSMENT DOCUMENTED: ICD-10-PCS | Mod: CPTII,S$GLB,, | Performed by: INTERNAL MEDICINE

## 2023-03-13 PROCEDURE — 3078F DIAST BP <80 MM HG: CPT | Mod: CPTII,S$GLB,, | Performed by: INTERNAL MEDICINE

## 2023-03-13 PROCEDURE — 1101F PR PT FALLS ASSESS DOC 0-1 FALLS W/OUT INJ PAST YR: ICD-10-PCS | Mod: CPTII,S$GLB,, | Performed by: INTERNAL MEDICINE

## 2023-03-13 PROCEDURE — 3074F PR MOST RECENT SYSTOLIC BLOOD PRESSURE < 130 MM HG: ICD-10-PCS | Mod: CPTII,S$GLB,, | Performed by: INTERNAL MEDICINE

## 2023-03-13 RX ORDER — VALSARTAN 160 MG/1
160 TABLET ORAL DAILY
Qty: 90 TABLET | Refills: 3 | Status: SHIPPED | OUTPATIENT
Start: 2023-03-13 | End: 2024-03-14

## 2023-03-13 NOTE — PROGRESS NOTES
Patient, Christina Tomlinson (MRN #[id number]), presented with a recorded BMI of 35.12 kg/m^2 and a documented comorbidity(s):  - Hypertension  - Atrial Fibrillation  to which the severe obesity is a contributing factor. This is consistent with the definition of severe obesity (BMI 35.0-39.9) with comorbidity (ICD-10 E66.01, Z68.35). The patient's severe obesity was monitored, evaluated, addressed and/or treated. This addendum to the medical record is made on 03/13/2023.

## 2023-03-13 NOTE — PROGRESS NOTES
Subjective:   Patient ID:  Christina Tomlinson is a 67 y.o. female who presents for evaluation of cardiomyopathy    HPI:   Here for follow up   She stated that she has been doing ok,  She exercise 3 times a week at [facility] center with no issues  No chest pain, no palpitations  No LE edema, no orthopnea.   Compliant with her medications  BP is toward the lower side this morning. She took the valsartan this morning  No syncope or pre syncope, no dizziness.   She doesn't monitor her BP at home     Hx of cardiomyopathy with LVEF ~ 45  9/18/19  Ef improved to 45% so not needing AICD.     Pt was previously seen by Dr. Solorio/Dr. Mckeon .    CATH 6/2016  Angiographic Results     Diagnostic:          Patient has a left dominant coronary artery.        - Left Main Coronary Artery:             The ostial LM is normal. There is ARIELA 3 flow.     - Left Anterior Descending Artery:             The LAD is normal. There is ARIELA 3 flow.     - D1:             The D1 has luminal irregularities. There is ARIELA 3 flow.     - Ramus:             The proximal ramus has a 60% stenosis. There is ARIELA 3 flow.     - Left Circumflex Artery:             The LCX is normal. There is ARIELA 3 flow.     - Right Coronary Artery:             The proximal RCA has a 75% stenosis. There is ARIELA 3 flow. The remaining portion of the vessel is of small caliber.    TTE 4/2022  The left ventricle is normal in size with concentric remodeling and normal systolic function.  The estimated ejection fraction is 55%.  Normal left ventricular diastolic function.  Normal right ventricular size with normal right ventricular systolic function.  Mild-to-moderate mitral regurgitation.  Normal central venous pressure (3 mmHg).  TTE 2/11/2021  Mild concentric hypertrophy and low normal systolic function. The estimated ejection fraction is 50%  Mild-to-moderate mitral regurgitation.  Normal left ventricular diastolic function.  Mild pulmonic regurgitation.  Mild tricuspid  regurgitation.  Normal right ventricular size with normal right ventricular systolic function.  There is pulmonary hypertension.    TTE 2017      Mildly depressed left ventricular systolic function (EF 45-50%).     2 - Normal left ventricular diastolic function.     3 - Normal right ventricular systolic function .     4 - Moderate mitral regurgitation.     DEVAN 2016    Moderately depressed left ventricular systolic function (EF 35-40%).     2 - Mild to moderate mitral regurgitation.     3 - Left atrial appendage is single lobed.     4 - Left atrium is enlarged.     5 - Grade 1 atheroma disease of aorta.        Holter Monitor 2021  Sinus rhythm HR  bpm (av bpm)  Very rare PACs, PVCs  Diary not returned to correlate any symptoms    Patient Active Problem List    Diagnosis Date Noted    Morbid (severe) obesity due to excess calories 2022    Coronary artery disease involving native coronary artery of native heart with angina pectoris 2022    Obesity (BMI 30-39.9) 02/10/2021    Chronic pain of both shoulders 2020    Decreased ROM of right shoulder 2020    Decreased ROM of left shoulder 2020    Muscle weakness of left arm 2020    Muscle weakness of right arm 2020    Urolithiasis 10/05/2018    Moderate mitral insufficiency 01/10/2017    Cardiomyopathy 2016    Coronary artery disease involving native coronary artery of native heart without angina pectoris 2016    Cardiomyopathy due to hypertension, with heart failure 2016     L dominant system, LAD, D1, LCx patent, Ramus 60%, small RCA with 75% stenosis, EF 40% with LVEDP 28 mmHg  MR not well visualized, at least moderate      Decreased ROM of right knee 03/15/2016    Weakness of right lower extremity 03/15/2016    Difficulty walking 03/15/2016    Decreased functional mobility 03/15/2016    S/P total knee arthroplasty 2016    Knee pain 2016    Essential hypertension 2016     Anemia of chronic disease 03/02/2016    Aftercare following right knee joint replacement surgery 02/01/2016    Anemia 01/13/2016    Primary hyperparathyroidism 12/02/2015    Acute rheumatoid arthritis 10/08/2015    Primary osteoarthritis of right knee 09/16/2015    Back pain of thoracolumbar region 06/29/2015    GERD (gastroesophageal reflux disease) 06/29/2015    Arthritis of knee 04/07/2015    Arthritis of hand 04/07/2015    Medial meniscus tear 08/27/2014    Dysphagia, oropharyngeal 09/06/2013    Dysphagia, unspecified(787.20) 09/06/2013     Dx updated per 2019 IMO Load      Breast cancer screening 07/30/2013    Sore throat 01/07/2013    Torn rotator cuff 01/07/2013    Corns and callosities 11/30/2012    Dermatophytosis of nail 11/30/2012    Pain in limb 11/30/2012       Patient's Medications   New Prescriptions    No medications on file   Previous Medications    ACETAMINOPHEN (TYLENOL) 500 MG TABLET    Take 500 mg by mouth every 6 (six) hours as needed for Pain.    AMITRIPTYLINE (ELAVIL) 25 MG TABLET    Take 25 mg by mouth nightly as needed for Insomnia.    ASPIRIN (ECOTRIN) 81 MG EC TABLET    Take 1 tablet (81 mg total) by mouth once. for 1 dose    ATORVASTATIN (LIPITOR) 40 MG TABLET    Take 1 tablet by mouth once daily    BRIMONIDINE 0.15 % OPTH DROP (ALPHAGAN) 0.15 % OPHTHALMIC SOLUTION    1 drop 3 (three) times daily.    CHOLECALCIFEROL, VITAMIN D3, 3,000 UNIT TAB    Take by mouth.    FAMOTIDINE (PEPCID AC ORAL)    Take by mouth.    FERROUS GLUCONATE (FERGON) 240 (27 FE) MG TABLET    Take 480 mg by mouth 3 (three) times daily.    FUROSEMIDE (LASIX) 40 MG TABLET    Take 1 tablet by mouth once daily    HYDROXYCHLOROQUINE (PLAQUENIL) 200 MG TABLET    Take 200 mg by mouth 2 (two) times daily.     LEFLUNOMIDE (ARAVA) 10 MG TAB    Take 20 mg by mouth once daily.    METOPROLOL SUCCINATE (TOPROL-XL) 50 MG 24 HR TABLET    Take 1 tablet by mouth twice daily    VALSARTAN (DIOVAN) 320 MG TABLET    Take 1 tablet (320 mg  total) by mouth once daily.   Modified Medications    No medications on file   Discontinued Medications    No medications on file         Review of Systems   Constitutional: Negative for chills and fever.   HENT:  Negative for hearing loss and nosebleeds.    Eyes:  Negative for blurred vision.   Cardiovascular:  Negative for chest pain, leg swelling and palpitations.   Respiratory:  Negative for hemoptysis and shortness of breath.    Hematologic/Lymphatic: Negative for bleeding problem.   Skin:  Negative for itching.   Musculoskeletal:  Negative for falls.   Gastrointestinal:  Negative for abdominal pain and hematochezia.   Genitourinary:  Negative for hematuria.   Neurological:  Negative for dizziness and loss of balance.   Psychiatric/Behavioral:  Negative for altered mental status and depression.        Objective:   Physical Exam  Constitutional:       Appearance: She is well-developed.   HENT:      Head: Normocephalic and atraumatic.   Eyes:      Conjunctiva/sclera: Conjunctivae normal.   Neck:      Vascular: No carotid bruit or JVD.   Cardiovascular:      Rate and Rhythm: Normal rate and regular rhythm.      Pulses:           Carotid pulses are 2+ on the right side and 2+ on the left side.       Radial pulses are 2+ on the right side and 2+ on the left side.      Heart sounds: Normal heart sounds. No murmur heard.    No friction rub. No gallop.   Pulmonary:      Effort: Pulmonary effort is normal. No respiratory distress.      Breath sounds: Normal breath sounds. No stridor. No wheezing.   Musculoskeletal:      Cervical back: Neck supple.   Skin:     General: Skin is warm and dry.   Neurological:      Mental Status: She is alert and oriented to person, place, and time.   Psychiatric:         Behavior: Behavior normal.       Lab Results    Lab Results   Component Value Date     02/15/2023    K 4.2 02/15/2023     02/15/2023    CO2 29 02/15/2023    BUN 20 02/15/2023    CREATININE 1.42 (H) 02/15/2023     GLU 90 02/15/2023    HGBA1C 5.2 02/15/2016    MG 2.1 02/15/2023    AST 16 02/15/2023    ALT 11 02/15/2023    ALBUMIN 3.7 02/15/2023    PROT 6.5 02/15/2023    BILITOT 0.3 02/15/2023    WBC 6.9 02/15/2023    HGB 10.6 (L) 02/15/2023    HCT 33.1 (L) 02/15/2023    MCV 89.9 02/15/2023     02/15/2023    INR 1.0 08/17/2016    TSH 1.60 08/01/2022    CHOL 145 08/01/2022    HDL 43 (L) 08/01/2022    LDLCALC 75 08/01/2022    TRIG 178 (H) 08/01/2022    CRP 27.6 (H) 09/14/2016       Lipid panel  Lab Results   Component Value Date    CHOL 145 08/01/2022     Lab Results   Component Value Date    HDL 43 (L) 08/01/2022     Lab Results   Component Value Date    LDLCALC 75 08/01/2022     Lab Results   Component Value Date    TRIG 178 (H) 08/01/2022       Cardiac Studies  Significant Imaging: Echocardiogram:   2D echo with color flow doppler:   Results for orders placed or performed during the hospital encounter of 01/16/17   2D Echo w/ Color Flow Doppler   Result Value Ref Range    EF + QEF 45 55 - 65    Mitral Valve Regurgitation MODERATE (A)     Diastolic Dysfunction No     Est. PA Systolic Pressure 18.68     Mitral Valve Mobility NORMAL     Tricuspid Valve Regurgitation TRIVIAL TO MILD     Narrative    Date of Procedure: 01/16/2017        TEST DESCRIPTION   Technical Quality: This is a technically adequate study.     Aorta: The aortic root is normal in size, measuring 2.1 cm at sinotubular junction.     Left Atrium: The left atrial volume index is normal, measuring 24.06 cc/m2.     Left Ventricle: The left ventricle is normal in size, with an end-diastolic diameter of 5.2 cm, and an end-systolic diameter of 4.1 cm. LV wall thickness is normal, with the septum measuring 0.6 cm and the posterior wall measuring 0.5 cm across. Relative   wall thickness was normal at 0.19, and the LV mass index was 53.6 g/m2 consistent with normal left ventricular mass. Global left ventricular systolic function appears mildly depressed. Visually  estimated ejection fraction is 45-50%. The LV Doppler   derived stroke volume equals 33.0 ccs.   The E/e'(lat) is 10, consistent with normal diastolic function.     Right Atrium: The right atrium is normal in size, measuring 3.3 cm in length in the apical view.     Right Ventricle: The right ventricle is normal in size measuring 2.3 cm at the base in the apical right ventricle-focused view. Global right ventricular systolic function appears normal. Tricuspid annular plane systolic excursion (TAPSE) is 2.0 cm. The   estimated PA systolic pressure is 19 mmHg.     Aortic Valve:  The aortic valve is normal in structure with normal leaflet mobility.     Mitral Valve:  The mitral valve is normal in structure with normal leaflet mobility. There is moderate mitral regurgitation.     Tricuspid Valve:  The tricuspid valve is normal in structure with normal leaflet mobility. There is trivial to mild tricuspid regurgitation.     Pulmonary Valve:  The pulmonic valve is not well seen.     IVC: IVC is normal in size and collapses > 50% with a sniff, suggesting normal right atrial pressure of 3 mmHg.     Atrial Septum: The atrial septum is intact.     Intracavitary: There is no evidence of pericardial effusion, intracavity mass, thrombi, or vegetation.         CONCLUSIONS     1 - Mildly depressed left ventricular systolic function (EF 45-50%).     2 - Normal left ventricular diastolic function.     3 - Normal right ventricular systolic function .     4 - Moderate mitral regurgitation.             This document has been electronically    SIGNED BY: Chan Solorio MD On: 01/16/2017 11:55    and Transthoracic echo (TTE) complete (Cupid Only):   Results for orders placed or performed during the hospital encounter of 04/07/22   Echo   Result Value Ref Range    AV mean gradient 3 mmHg    Ao peak leo 1.08 m/s    Ao VTI 20.37 cm    IVS 1.00 (A) 0.6 - 1.1 cm    LA size 3.01 cm    Left Atrium Major Axis 4.36 cm    Left Atrium Minor Axis 4.60  "cm    LVIDd 4.20 3.5 - 6.0 cm    LVIDs 2.00 2.1 - 4.0 cm    LVOT diameter 1.99 cm    LVOT peak VTI 16.45 cm    Posterior Wall 1.00 (A) 0.6 - 1.1 cm    PV Peak D Bobby 0.40 m/s    PV Peak S Bobby 0.50 m/s    RA Major Axis 4.00 cm    RA Width 3.40 cm    RVDD 2.10 cm    Sinus 2.37 cm    TAPSE 1.46 cm    Ao root annulus 2.07 cm    AORTIC VALVE CUSP SEPERATION 1.61 cm    PV PEAK VELOCITY 0.84 cm/s    LV Diastolic Volume 64.74 mL    LV Systolic Volume 31.34 mL    LVOT peak bobby 0.90 m/s    LA WIDTH 3.50 cm    Mr max bobby 0.06 m/s    LA volume (mod) 35.00 cm3    MV "A" wave duration 9.46 msec    MV mean gradient 1 mmHg    MV peak gradient 6 mmHg    RV S' 7.27 cm/s    MV VTI 19.17 cm    TDI LATERAL 0.08 m/s    TDI SEPTAL 0.06 m/s    FS 52 %    LA volume 40.09 cm3    LV mass 137.25 g    Left Ventricle Relative Wall Thickness 0.48 cm    AV valve area 2.51 cm2    AV Velocity Ratio 0.83     AV index (prosthetic) 0.81     MV valve area by continuity eq 2.67 cm2    Mean e' 0.07 m/s    Pulm vein S/D ratio 1.25     LVOT area 3.1 cm2    LVOT stroke volume 51.14 cm3    AV peak gradient 5 mmHg    BSA 2.06 m2    LV Systolic Volume Index 15.8 mL/m2    LV Diastolic Volume Index 32.70 mL/m2    LA Volume Index 20.2 mL/m2    LV Mass Index 69 g/m2    LA Volume Index (Mod) 17.7 mL/m2    Right Atrial Pressure (from IVC) 3 mmHg    EF 55 %    Right ventricular length in diastole (apical 4-chamber view) 4.50 cm    Narrative    · The left ventricle is normal in size with concentric remodeling and   normal systolic function.  · The estimated ejection fraction is 55%.  · Normal left ventricular diastolic function.  · Normal right ventricular size with normal right ventricular systolic   function.  · Mild-to-moderate mitral regurgitation.  · Normal central venous pressure (3 mmHg).        ECG:  normal EKG, normal sinus rhythm, unchanged from previous tracings.    Cath study :  2016    Angiographic Results     Diagnostic:          Patient has a left " dominant coronary artery.        - Left Main Coronary Artery:             The ostial LM is normal. There is ARIELA 3 flow.     - Left Anterior Descending Artery:             The LAD is normal. There is ARIELA 3 flow.     - D1:             The D1 has luminal irregularities. There is ARIELA 3 flow.     - Ramus:             The proximal ramus has a 60% stenosis. There is ARIELA 3 flow.     - Left Circumflex Artery:             The LCX is normal. There is ARIELA 3 flow.     - Right Coronary Artery:             The proximal RCA has a 75% stenosis. There is ARIELA 3 flow. The remaining portion of the vessel is of small caliber.       Assessment:     1. Essential hypertension    2. Cardiomyopathy due to hypertension, with heart failure    3. Coronary artery disease involving native coronary artery of native heart without angina pectoris    4. Moderate mitral insufficiency    5. Coronary artery disease involving native coronary artery of native heart with angina pectoris        Plan:   - EF recovered  - ContinueToprol to 50  BID  - Continue GDMT  - Cut Valsartan to 160 mg daily. Start monitoring BP at home and keep log. Send us the log after 14 days   - Diovan, lasix (prn)   - Stable CAD / will continue ASA/statin   - LDL close to goal. Repeat lipid panel next visit  - Low salt diet  - Will monitor for mild-moderate MR. Clinically doing very well. NYHA FC II        I spent 5-10 minutes asking, assessing, assisting, arranging and advising heart healthy diet improvements. This included low-salt meals, portion control and health food alternatives. I also encourage 30 minutes of moderate exercise 3-4x a week.       Continue with current medical plan and lifestyle changes.  Return sooner for concerns or questions. If symptoms persist go to the ED  Total duration of face to face visit time 30 minutes.  Total time spent counseling greater than fifty percent of total visit time.  Counseling included discussion regarding imaging findings,  diagnosis, possibilities, treatment options, risks and benefits.      No orders of the defined types were placed in this encounter.        Follow up as scheduled. Return to clinic in 6 months  She expressed verbal understanding and agreed with the plan    Thank you for the opportunity to care for this patient. Will be available for questions if needed.

## 2023-03-14 ENCOUNTER — OFFICE VISIT (OUTPATIENT)
Dept: SURGERY | Facility: CLINIC | Age: 68
End: 2023-03-14
Payer: MEDICARE

## 2023-03-14 VITALS
HEART RATE: 94 BPM | SYSTOLIC BLOOD PRESSURE: 110 MMHG | WEIGHT: 205.81 LBS | HEIGHT: 64 IN | DIASTOLIC BLOOD PRESSURE: 62 MMHG | BODY MASS INDEX: 35.13 KG/M2

## 2023-03-14 DIAGNOSIS — L02.416 ABSCESS OF SKIN OF LEFT HIP: Primary | ICD-10-CM

## 2023-03-14 PROCEDURE — 3074F SYST BP LT 130 MM HG: CPT | Mod: CPTII,S$GLB,, | Performed by: STUDENT IN AN ORGANIZED HEALTH CARE EDUCATION/TRAINING PROGRAM

## 2023-03-14 PROCEDURE — 1159F PR MEDICATION LIST DOCUMENTED IN MEDICAL RECORD: ICD-10-PCS | Mod: CPTII,S$GLB,, | Performed by: STUDENT IN AN ORGANIZED HEALTH CARE EDUCATION/TRAINING PROGRAM

## 2023-03-14 PROCEDURE — 1126F PR PAIN SEVERITY QUANTIFIED, NO PAIN PRESENT: ICD-10-PCS | Mod: CPTII,S$GLB,, | Performed by: STUDENT IN AN ORGANIZED HEALTH CARE EDUCATION/TRAINING PROGRAM

## 2023-03-14 PROCEDURE — 3288F FALL RISK ASSESSMENT DOCD: CPT | Mod: CPTII,S$GLB,, | Performed by: STUDENT IN AN ORGANIZED HEALTH CARE EDUCATION/TRAINING PROGRAM

## 2023-03-14 PROCEDURE — 4010F ACE/ARB THERAPY RXD/TAKEN: CPT | Mod: CPTII,S$GLB,, | Performed by: STUDENT IN AN ORGANIZED HEALTH CARE EDUCATION/TRAINING PROGRAM

## 2023-03-14 PROCEDURE — 1126F AMNT PAIN NOTED NONE PRSNT: CPT | Mod: CPTII,S$GLB,, | Performed by: STUDENT IN AN ORGANIZED HEALTH CARE EDUCATION/TRAINING PROGRAM

## 2023-03-14 PROCEDURE — 4010F PR ACE/ARB THEARPY RXD/TAKEN: ICD-10-PCS | Mod: CPTII,S$GLB,, | Performed by: STUDENT IN AN ORGANIZED HEALTH CARE EDUCATION/TRAINING PROGRAM

## 2023-03-14 PROCEDURE — 3074F PR MOST RECENT SYSTOLIC BLOOD PRESSURE < 130 MM HG: ICD-10-PCS | Mod: CPTII,S$GLB,, | Performed by: STUDENT IN AN ORGANIZED HEALTH CARE EDUCATION/TRAINING PROGRAM

## 2023-03-14 PROCEDURE — 1101F PT FALLS ASSESS-DOCD LE1/YR: CPT | Mod: CPTII,S$GLB,, | Performed by: STUDENT IN AN ORGANIZED HEALTH CARE EDUCATION/TRAINING PROGRAM

## 2023-03-14 PROCEDURE — 99024 PR POST-OP FOLLOW-UP VISIT: ICD-10-PCS | Mod: S$GLB,,, | Performed by: STUDENT IN AN ORGANIZED HEALTH CARE EDUCATION/TRAINING PROGRAM

## 2023-03-14 PROCEDURE — 3288F PR FALLS RISK ASSESSMENT DOCUMENTED: ICD-10-PCS | Mod: CPTII,S$GLB,, | Performed by: STUDENT IN AN ORGANIZED HEALTH CARE EDUCATION/TRAINING PROGRAM

## 2023-03-14 PROCEDURE — 1159F MED LIST DOCD IN RCRD: CPT | Mod: CPTII,S$GLB,, | Performed by: STUDENT IN AN ORGANIZED HEALTH CARE EDUCATION/TRAINING PROGRAM

## 2023-03-14 PROCEDURE — 3066F NEPHROPATHY DOC TX: CPT | Mod: CPTII,S$GLB,, | Performed by: STUDENT IN AN ORGANIZED HEALTH CARE EDUCATION/TRAINING PROGRAM

## 2023-03-14 PROCEDURE — 3008F PR BODY MASS INDEX (BMI) DOCUMENTED: ICD-10-PCS | Mod: CPTII,S$GLB,, | Performed by: STUDENT IN AN ORGANIZED HEALTH CARE EDUCATION/TRAINING PROGRAM

## 2023-03-14 PROCEDURE — 3008F BODY MASS INDEX DOCD: CPT | Mod: CPTII,S$GLB,, | Performed by: STUDENT IN AN ORGANIZED HEALTH CARE EDUCATION/TRAINING PROGRAM

## 2023-03-14 PROCEDURE — 1101F PR PT FALLS ASSESS DOC 0-1 FALLS W/OUT INJ PAST YR: ICD-10-PCS | Mod: CPTII,S$GLB,, | Performed by: STUDENT IN AN ORGANIZED HEALTH CARE EDUCATION/TRAINING PROGRAM

## 2023-03-14 PROCEDURE — 99999 PR PBB SHADOW E&M-EST. PATIENT-LVL III: ICD-10-PCS | Mod: PBBFAC,,, | Performed by: STUDENT IN AN ORGANIZED HEALTH CARE EDUCATION/TRAINING PROGRAM

## 2023-03-14 PROCEDURE — 3078F PR MOST RECENT DIASTOLIC BLOOD PRESSURE < 80 MM HG: ICD-10-PCS | Mod: CPTII,S$GLB,, | Performed by: STUDENT IN AN ORGANIZED HEALTH CARE EDUCATION/TRAINING PROGRAM

## 2023-03-14 PROCEDURE — 99999 PR PBB SHADOW E&M-EST. PATIENT-LVL III: CPT | Mod: PBBFAC,,, | Performed by: STUDENT IN AN ORGANIZED HEALTH CARE EDUCATION/TRAINING PROGRAM

## 2023-03-14 PROCEDURE — 99024 POSTOP FOLLOW-UP VISIT: CPT | Mod: S$GLB,,, | Performed by: STUDENT IN AN ORGANIZED HEALTH CARE EDUCATION/TRAINING PROGRAM

## 2023-03-14 PROCEDURE — 3066F PR DOCUMENTATION OF TREATMENT FOR NEPHROPATHY: ICD-10-PCS | Mod: CPTII,S$GLB,, | Performed by: STUDENT IN AN ORGANIZED HEALTH CARE EDUCATION/TRAINING PROGRAM

## 2023-03-14 PROCEDURE — 3078F DIAST BP <80 MM HG: CPT | Mod: CPTII,S$GLB,, | Performed by: STUDENT IN AN ORGANIZED HEALTH CARE EDUCATION/TRAINING PROGRAM

## 2023-03-15 NOTE — PROGRESS NOTES
S/p I&D left hip abscess  Doing well  No pain now  Wound open, filling in well  Induration, tenderness, erythema resolved  Finished abx, does not need more  Dressing changes as needed until dries up, will likely take a few more weeks to completely scar over  RTC anytime

## 2023-04-18 ENCOUNTER — TELEPHONE (OUTPATIENT)
Dept: GASTROENTEROLOGY | Facility: CLINIC | Age: 68
End: 2023-04-18
Payer: MEDICARE

## 2023-04-18 NOTE — TELEPHONE ENCOUNTER
----- Message from Glenda Vega sent at 4/18/2023 11:20 AM CDT -----  Contact: shakira  Type:  appt     Who Called:daughter (shakira)  Does the patient know what this is regarding?:digestive issues   Feels like food isn't going down all the way  Feels like it's on her chest  Would the patient rather a call back or a response via [facility]? Call   Best Call Back Number:   Additional Information:

## 2023-04-18 NOTE — TELEPHONE ENCOUNTER
Jyothi, daughter, requesting gi clinic appt, pt having heaviness in chest after eating.  Appt scheduled on Monday, April 24, 2023 at 230pm, clinic address given and repeated correctly.

## 2023-04-24 ENCOUNTER — OFFICE VISIT (OUTPATIENT)
Dept: GASTROENTEROLOGY | Facility: CLINIC | Age: 68
End: 2023-04-24
Payer: MEDICARE

## 2023-04-24 VITALS
HEIGHT: 64 IN | BODY MASS INDEX: 36.4 KG/M2 | DIASTOLIC BLOOD PRESSURE: 79 MMHG | WEIGHT: 213.19 LBS | SYSTOLIC BLOOD PRESSURE: 139 MMHG | HEART RATE: 111 BPM

## 2023-04-24 DIAGNOSIS — R10.13 EPIGASTRIC ABDOMINAL PAIN: Primary | ICD-10-CM

## 2023-04-24 NOTE — PROGRESS NOTES
"[facility]    CC: Epigastric pain    HPI 67 y.o. female with GERD and RA who presents with complaints of epigastric abdominal pain.     She states this is a new complaint that she first noticed one month ago. She reports the sensation of a heaviness and burning pain in her mid epigastric region that comes on with meals and is especially exacerbated by spicy foods. She denies any dysphagia, n/v, or overt GI blood loss. She has not lost weight and is not avoiding food, but reports this discomfort is progressing. She does take famotidine given to her by her Rheumatologist with un-noticeable improvement.     Prior EGD and colonoscopy in 2016. EGD with small hiatal hernia. Gastric biopsies negative for HP. Duodenal biopsies with chronic, inactive duodenitis. Colonoscopy normal (10 year follow-up recommended).     Past Medical History  HTN  CAD  Cardiomyopathy   OA  GERD    Physical Examination  /79 (BP Location: Left arm, Patient Position: Sitting, BP Method: Large (Automatic))   Pulse (!) 111   Ht 5' 4" (1.626 m)   Wt 96.7 kg (213 lb 3 oz)   BMI 36.59 kg/m²   General appearance: alert, cooperative, no distress  Abdomen: soft, non-tender; bowel sounds normoactive; no organomegaly  Lab Results   Component Value Date    WBC 6.9 02/15/2023    HGB 10.6 (L) 02/15/2023    HCT 33.1 (L) 02/15/2023    MCV 89.9 02/15/2023     02/15/2023           Assessment:   67 year old female with RA and previous GERD on chronic famotidine presents with a one month history of epigastric pain as a new complaint with uncertain prognosis. Though no overt alarm symptoms, this is a new complaint that is progressing. Possibly just esophageal spasms vs esophagitis, but need to rule out things such as an esophageal ulcer, malignancy or other structural abnormalities.       Plan:  - EGD on 5/11/2023  - Continue Pepcid at this time  - Next step will be adding PPI then consider bentyl pending endoscopy findings   - Repeat " colonoscopy in 2/2026 for screening purposes     Total encounter time >45 min including all elements     Okay to continue ASA     Curt Claire MD   [address], [address]   [place] [id number] [phone]

## 2023-05-08 ENCOUNTER — TELEPHONE (OUTPATIENT)
Dept: ENDOSCOPY | Facility: HOSPITAL | Age: 68
End: 2023-05-08
Payer: MEDICARE

## 2023-05-09 ENCOUNTER — TELEPHONE (OUTPATIENT)
Dept: ENDOSCOPY | Facility: HOSPITAL | Age: 68
End: 2023-05-09
Payer: MEDICARE

## 2023-05-09 NOTE — TELEPHONE ENCOUNTER
Spoke with patient about arrival time @ 0730.   EGD    NPO status reviewed: Light meals on Wednesday. Patient must have nothing to eat after midnight.     Medications: Do not take Insulin or oral diabetic medications the day of the procedure.  Take as prescribed: heart, seizure and blood pressure medication in the morning with a sip of water (less than an ounce).  Take any breathing medications and bring inhalers to hospital with you Leave all valuables and jewelry at home.     Wear comfortable clothes to procedure to change into hospital gown You cannot drive for 24 hours after your procedure because you will receive sedation for your procedure to make you comfortable.  A ride must be provided at discharge.

## 2023-05-10 NOTE — H&P
[facility]    CC: Epigastric pain    HPI 67 y.o. female with GERD and RA who presents with complaints of epigastric abdominal pain.     She states this is a new complaint that she first noticed one month ago. She reports the sensation of a heaviness and burning pain in her mid epigastric region that comes on with meals and is especially exacerbated by spicy foods. She denies any dysphagia, n/v, or overt GI blood loss. She has not lost weight and is not avoiding food, but reports this discomfort is progressing. She does take famotidine given to her by her Rheumatologist with un-noticeable improvement.     Prior EGD and colonoscopy in 2016. EGD with small hiatal hernia. Gastric biopsies negative for HP. Duodenal biopsies with chronic, inactive duodenitis. Colonoscopy normal (10 year follow-up recommended).     Past Medical History  HTN  CAD  Cardiomyopathy   OA  GERD    Physical Examination  There were no vitals taken for this visit.  General appearance: alert, cooperative, no distress  Abdomen: soft, non-tender; bowel sounds normoactive; no organomegaly  Lab Results   Component Value Date    WBC 6.9 02/15/2023    HGB 10.6 (L) 02/15/2023    HCT 33.1 (L) 02/15/2023    MCV 89.9 02/15/2023     02/15/2023           Assessment:   67 year old female with RA and previous GERD on chronic famotidine presents with a one month history of epigastric pain as a new complaint with uncertain prognosis. Though no overt alarm symptoms, this is a new complaint that is progressing. Possibly just esophageal spasms vs esophagitis, but need to rule out things such as an esophageal ulcer, malignancy or other structural abnormalities.       Plan:  - EGD today  - Next step will be adding PPI then consider bentyl pending endoscopy findings       Curt Claire MD   [address] [facility] [facility], [address]   [place] [address] [phone]

## 2023-05-11 ENCOUNTER — ANESTHESIA (OUTPATIENT)
Dept: ENDOSCOPY | Facility: HOSPITAL | Age: 68
End: 2023-05-11
Payer: MEDICARE

## 2023-05-11 ENCOUNTER — ANESTHESIA EVENT (OUTPATIENT)
Dept: ENDOSCOPY | Facility: HOSPITAL | Age: 68
End: 2023-05-11
Payer: MEDICARE

## 2023-05-11 ENCOUNTER — HOSPITAL ENCOUNTER (OUTPATIENT)
Facility: HOSPITAL | Age: 68
Discharge: HOME | End: 2023-05-11
Attending: INTERNAL MEDICINE | Admitting: INTERNAL MEDICINE
Payer: MEDICARE

## 2023-05-11 VITALS
BODY MASS INDEX: 35.34 KG/M2 | HEIGHT: 64 IN | SYSTOLIC BLOOD PRESSURE: 120 MMHG | DIASTOLIC BLOOD PRESSURE: 48 MMHG | TEMPERATURE: 99 F | RESPIRATION RATE: 18 BRPM | WEIGHT: 207 LBS | OXYGEN SATURATION: 99 % | HEART RATE: 89 BPM

## 2023-05-11 DIAGNOSIS — R10.9 ABDOMINAL PAIN, UNSPECIFIED ABDOMINAL LOCATION: Primary | ICD-10-CM

## 2023-05-11 DIAGNOSIS — R10.13 EPIGASTRIC PAIN: ICD-10-CM

## 2023-05-11 DIAGNOSIS — R10.9 ABDOMINAL PAIN: ICD-10-CM

## 2023-05-11 DIAGNOSIS — K29.70 GASTRITIS, PRESENCE OF BLEEDING UNSPECIFIED, UNSPECIFIED CHRONICITY, UNSPECIFIED GASTRITIS TYPE: ICD-10-CM

## 2023-05-11 PROCEDURE — D9220A PRA ANESTHESIA: Mod: ANES,,, | Performed by: STUDENT IN AN ORGANIZED HEALTH CARE EDUCATION/TRAINING PROGRAM

## 2023-05-11 PROCEDURE — 88305 TISSUE EXAM BY PATHOLOGIST: ICD-10-PCS | Mod: 26,,, | Performed by: PATHOLOGY

## 2023-05-11 PROCEDURE — D9220A PRA ANESTHESIA: ICD-10-PCS | Mod: ANES,,, | Performed by: STUDENT IN AN ORGANIZED HEALTH CARE EDUCATION/TRAINING PROGRAM

## 2023-05-11 PROCEDURE — D9220A PRA ANESTHESIA: ICD-10-PCS | Mod: CRNA,,, | Performed by: NURSE ANESTHETIST, CERTIFIED REGISTERED

## 2023-05-11 PROCEDURE — 25000003 PHARM REV CODE 250: Performed by: NURSE ANESTHETIST, CERTIFIED REGISTERED

## 2023-05-11 PROCEDURE — 37000009 HC ANESTHESIA EA ADD 15 MINS: Performed by: INTERNAL MEDICINE

## 2023-05-11 PROCEDURE — 37000008 HC ANESTHESIA 1ST 15 MINUTES: Performed by: INTERNAL MEDICINE

## 2023-05-11 PROCEDURE — 63600175 PHARM REV CODE 636 W HCPCS: Performed by: NURSE ANESTHETIST, CERTIFIED REGISTERED

## 2023-05-11 PROCEDURE — D9220A PRA ANESTHESIA: Mod: CRNA,,, | Performed by: NURSE ANESTHETIST, CERTIFIED REGISTERED

## 2023-05-11 PROCEDURE — 88305 TISSUE EXAM BY PATHOLOGIST: CPT | Performed by: PATHOLOGY

## 2023-05-11 PROCEDURE — 25000003 PHARM REV CODE 250: Performed by: INTERNAL MEDICINE

## 2023-05-11 PROCEDURE — 27201012 HC FORCEPS, HOT/COLD, DISP: Performed by: INTERNAL MEDICINE

## 2023-05-11 PROCEDURE — 43239 EGD BIOPSY SINGLE/MULTIPLE: CPT | Performed by: INTERNAL MEDICINE

## 2023-05-11 PROCEDURE — 88305 TISSUE EXAM BY PATHOLOGIST: CPT | Mod: 26,,, | Performed by: PATHOLOGY

## 2023-05-11 RX ORDER — PANTOPRAZOLE SODIUM 40 MG/1
40 TABLET, DELAYED RELEASE ORAL DAILY
Qty: 30 TABLET | Refills: 11 | Status: SHIPPED | OUTPATIENT
Start: 2023-05-11 | End: 2024-05-10

## 2023-05-11 RX ORDER — PROPOFOL 10 MG/ML
INJECTION, EMULSION INTRAVENOUS
Status: DISCONTINUED | OUTPATIENT
Start: 2023-05-11 | End: 2023-05-11

## 2023-05-11 RX ORDER — SODIUM CHLORIDE 0.9 % (FLUSH) 0.9 %
10 SYRINGE (ML) INJECTION
Status: CANCELLED | OUTPATIENT
Start: 2023-05-11

## 2023-05-11 RX ORDER — SODIUM CHLORIDE 9 MG/ML
INJECTION, SOLUTION INTRAVENOUS CONTINUOUS
Status: DISCONTINUED | OUTPATIENT
Start: 2023-05-11 | End: 2023-05-11 | Stop reason: HOSPADM

## 2023-05-11 RX ORDER — SODIUM CHLORIDE 0.9 % (FLUSH) 0.9 %
10 SYRINGE (ML) INJECTION
Status: DISCONTINUED | OUTPATIENT
Start: 2023-05-11 | End: 2023-05-11 | Stop reason: HOSPADM

## 2023-05-11 RX ORDER — SODIUM CHLORIDE, SODIUM LACTATE, POTASSIUM CHLORIDE, CALCIUM CHLORIDE 600; 310; 30; 20 MG/100ML; MG/100ML; MG/100ML; MG/100ML
INJECTION, SOLUTION INTRAVENOUS CONTINUOUS PRN
Status: DISCONTINUED | OUTPATIENT
Start: 2023-05-11 | End: 2023-05-11

## 2023-05-11 RX ORDER — LIDOCAINE HYDROCHLORIDE 20 MG/ML
INJECTION INTRAVENOUS
Status: DISCONTINUED | OUTPATIENT
Start: 2023-05-11 | End: 2023-05-11

## 2023-05-11 RX ORDER — SODIUM CHLORIDE 9 MG/ML
INJECTION, SOLUTION INTRAVENOUS CONTINUOUS
Status: CANCELLED | OUTPATIENT
Start: 2023-05-11

## 2023-05-11 RX ADMIN — PROPOFOL 120 MG: 10 INJECTION, EMULSION INTRAVENOUS at 08:05

## 2023-05-11 RX ADMIN — GLYCOPYRROLATE 0.2 MG: 0.2 INJECTION, SOLUTION INTRAMUSCULAR; INTRAVITREAL at 08:05

## 2023-05-11 RX ADMIN — LIDOCAINE HYDROCHLORIDE 100 MG: 20 INJECTION, SOLUTION INTRAVENOUS at 08:05

## 2023-05-11 RX ADMIN — SODIUM CHLORIDE: 0.9 INJECTION, SOLUTION INTRAVENOUS at 08:05

## 2023-05-11 RX ADMIN — TOPICAL ANESTHETIC 1 EACH: 200 SPRAY DENTAL; PERIODONTAL at 08:05

## 2023-05-11 RX ADMIN — SODIUM CHLORIDE, SODIUM LACTATE, POTASSIUM CHLORIDE, AND CALCIUM CHLORIDE: .6; .31; .03; .02 INJECTION, SOLUTION INTRAVENOUS at 08:05

## 2023-05-11 RX ADMIN — PROPOFOL 40 MG: 10 INJECTION, EMULSION INTRAVENOUS at 08:05

## 2023-05-11 NOTE — TRANSFER OF CARE
"Anesthesia Transfer of Care Note    Patient: Christina Tomlinson    Procedure(s) Performed: Procedure(s) (LRB):  EGD (ESOPHAGOGASTRODUODENOSCOPY) (N/A)    Patient location: GI    Anesthesia Type: general    Transport from OR: Transported from OR on room air with adequate spontaneous ventilation    Post pain: adequate analgesia    Post assessment: no apparent anesthetic complications and tolerated procedure well    Post vital signs: stable    Level of consciousness: awake    Nausea/Vomiting: no nausea/vomiting    Complications: none    Transfer of care protocol was followed      Last vitals:   Visit Vitals  BP (!) 125/58 (BP Location: Left arm, Patient Position: Lying)   Pulse 86   Temp 36.1 °C (97 °F) (Temporal)   Resp 16   Ht 5' 4" (1.626 m)   Wt 93.9 kg (207 lb)   SpO2 96%   Breastfeeding No   BMI 35.53 kg/m²     "

## 2023-05-11 NOTE — PROVATION PATIENT INSTRUCTIONS
Discharge Summary/Instructions after an Endoscopic Procedure  Patient Name: Christina Tomlinson  Patient MRN: [id number]  Patient YOB: 1955  Thursday, May 11, 2023  Curt Claire MD  Dear patient,  As a result of recent federal legislation (The Federal Cures Act), you may   receive lab or pathology results from your procedure in your [facility]   account before your physician is able to contact you. Your physician or   their representative will relay the results to you with their   recommendations at their soonest availability.  Thank you,  Your health is very important to us during the Covid Crisis. Following your   procedure today, you will receive a daily text for 2 weeks asking about   signs or symptoms of Covid 19.  Please respond to this text when you   receive it so we can follow up and keep you as safe as possible.   RESTRICTIONS:  During your procedure today, you received medications for sedation.  These   medications may affect your judgment, balance and coordination.  Therefore,   for 24 hours, you have the following restrictions:   - DO NOT drive a car, operate machinery, make legal/financial decisions,   sign important papers or drink alcohol.    ACTIVITY:  Today: no heavy lifting, straining or running due to procedural   sedation/anesthesia.  The following day: return to full activity including work.  DIET:  Eat and drink normally unless instructed otherwise.     TREATMENT FOR COMMON SIDE EFFECTS:  - Mild abdominal pain, nausea, belching, bloating or excessive gas:  rest,   eat lightly and use a heating pad.  - Sore Throat: treat with throat lozenges and/or gargle with warm salt   water.  - Because air was used during the procedure, expelling large amounts of air   from your rectum or belching is normal.  - If a bowel prep was taken, you may not have a bowel movement for 1-3 days.    This is normal.  SYMPTOMS TO WATCH FOR AND REPORT TO YOUR PHYSICIAN:  1. Abdominal pain or bloating, other than  gas cramps.  2. Chest pain.  3. Back pain.  4. Signs of infection such as: chills or fever occurring within 24 hours   after the procedure.  5. Rectal bleeding, which would show as bright red, maroon, or black stools.   (A tablespoon of blood from the rectum is not serious, especially if   hemorrhoids are present.)  6. Vomiting.  7. Weakness or dizziness.  GO DIRECTLY TO THE NEAREST EMERGENCY ROOM IF YOU HAVE ANY OF THE FOLLOWING:      Difficulty breathing              Chills and/or fever over 101 F   Persistent vomiting and/or vomiting blood   Severe abdominal pain   Severe chest pain   Black, tarry stools   Bleeding- more than one tablespoon   Any other symptom or condition that you feel may need urgent attention  Your doctor recommends these additional instructions:  If any biopsies were taken, your doctors clinic will contact you in 1 to 2   weeks with any results.  - Discharge to home  - Resume previous diet and medications  - Prescibed protonix 40 mg daily   - If H pylori negative and symptoms fail to improve with daily PPI, I will   trial Bentyl as next step  - Condition stable   - The signs and symptoms of potential delayed complications were discussed   with the patient. If signs or symptoms of these complications develop, call   the [facility] at [phone].   - Return to normal activities tomorrow.  Written discharge instructions were   provided to the patient.  For questions, problems or results please call your physician - Curt Claire MD.  EMERGENCY PHONE NUMBER: [phone],  LAB RESULTS: [phone]  IF A COMPLICATION OR EMERGENCY SITUATION ARISES AND YOU ARE UNABLE TO REACH   YOUR PHYSICIAN - GO DIRECTLY TO THE EMERGENCY ROOM.  MD Curt Quiroz MD  5/11/2023 9:18:02 AM  This report has been verified and signed electronically.  Dear patient,  As a result of recent federal legislation (The Federal Cures Act), you may   receive lab or pathology results from  your procedure in your [facility]   account before your physician is able to contact you. Your physician or   their representative will relay the results to you with their   recommendations at their soonest availability.  Thank you,  PROVATION

## 2023-05-11 NOTE — ANESTHESIA PREPROCEDURE EVALUATION
[facility]  Anesthesia Pre-Operative Evaluation         Patient Name: Christina Tomlinson  YOB: 1955  MRN: [id number]    SUBJECTIVE:     05/11/2023    Procedure(s) (LRB):  EGD (ESOPHAGOGASTRODUODENOSCOPY) (N/A)    Christina Tomlinson is a 67 y.o. female here for Procedure(s) (LRB):  EGD (ESOPHAGOGASTRODUODENOSCOPY) (N/A)    Drips:     Patient Active Problem List   Diagnosis    Corns and callosities    Dermatophytosis of nail    Pain in limb    Sore throat    Torn rotator cuff    Breast cancer screening    Dysphagia, oropharyngeal    Dysphagia, unspecified(861.75)    Medial meniscus tear    Arthritis of knee    Arthritis of hand    Back pain of thoracolumbar region    GERD (gastroesophageal reflux disease)    Primary osteoarthritis of right knee    Acute rheumatoid arthritis    Primary hyperparathyroidism    Anemia    Aftercare following right knee joint replacement surgery    Essential hypertension    Anemia of chronic disease    Knee pain    S/P total knee arthroplasty    Decreased ROM of right knee    Weakness of right lower extremity    Difficulty walking    Decreased functional mobility    Cardiomyopathy due to hypertension, with heart failure    Cardiomyopathy    Coronary artery disease involving native coronary artery of native heart without angina pectoris    Moderate mitral insufficiency    Urolithiasis    Chronic pain of both shoulders    Decreased ROM of right shoulder    Decreased ROM of left shoulder    Muscle weakness of left arm    Muscle weakness of right arm    Obesity (BMI 30-39.9)    Morbid (severe) obesity due to excess calories    Coronary artery disease involving native coronary artery of native heart with angina pectoris       Review of patient's allergies indicates:   Allergen Reactions    Lisinopril Edema     Prochlorperazine Anxiety       No current facility-administered medications on file prior to encounter.     Current Outpatient Medications on File Prior to Encounter   Medication Sig Dispense Refill    acetaminophen (TYLENOL) 500 MG tablet Take 500 mg by mouth every 6 (six) hours as needed for Pain.      amitriptyline (ELAVIL) 25 MG tablet Take 25 mg by mouth nightly as needed for Insomnia.      aspirin (ECOTRIN) 81 MG EC tablet Take 1 tablet (81 mg total) by mouth once. for 1 dose 90 tablet 3    atorvastatin (LIPITOR) 40 MG tablet Take 1 tablet by mouth once daily 90 tablet 0    brimonidine 0.15 % OPTH DROP (ALPHAGAN) 0.15 % ophthalmic solution 1 drop 3 (three) times daily.      cholecalciferol, vitamin D3, 3,000 unit Tab Take by mouth.      famotidine (PEPCID AC ORAL) Take by mouth.      ferrous gluconate (FERGON) 240 (27 FE) MG tablet Take 480 mg by mouth 3 (three) times daily.      furosemide (LASIX) 40 MG tablet Take 1 tablet by mouth once daily 90 tablet 0    hydroxychloroquine (PLAQUENIL) 200 mg tablet Take 200 mg by mouth 2 (two) times daily.       leflunomide (ARAVA) 10 MG Tab Take 20 mg by mouth once daily.      metoprolol succinate (TOPROL-XL) 50 MG 24 hr tablet Take 1 tablet by mouth twice daily 180 tablet 0    valsartan (DIOVAN) 160 MG tablet Take 1 tablet (160 mg total) by mouth once daily. 90 tablet 3       Past Surgical History:   Procedure Laterality Date    CHOLECYSTECTOMY      COLONOSCOPY N/A 2/1/2016    Procedure: COLONOSCOPY;  Surgeon: Aidan Reynoso Jr., MD;  Location: [facility];  Service: Endoscopy;  Laterality: N/A;    CYSTOSCOPY  10/5/2018    Procedure: CYSTOSCOPY;  Surgeon: Everette Quiroz MD;  Location: [facility] OR [address] [place] [address];  Service: Urology;;    HYSTERECTOMY      JOINT REPLACEMENT Right     TKA    KNEE ARTHROSCOPY      LASER LITHOTRIPSY Left 10/5/2018    Procedure: LITHOTRIPSY, USING LASER;  Surgeon: Everette Quiroz MD;  Location: [facility] OR [address] [place] [address];  Service:  Urology;  Laterality: Left;    OTHER SURGICAL HISTORY      PARATHYROIDECTOMY      RETROGRADE PYELOGRAPHY Left 10/5/2018    Procedure: PYELOGRAM, RETROGRADE;  Surgeon: Everette Quiorz MD;  Location: [facility] OR [address] [place] [address];  Service: Urology;  Laterality: Left;    TONSILLECTOMY      URETEROSCOPY Left 10/5/2018    Procedure: URETEROSCOPY;  Surgeon: Everette Quiroz MD;  Location: [facility] OR [address] [place] [address];  Service: Urology;  Laterality: Left;  90 min       Social History     Socioeconomic History    Marital status:    Tobacco Use    Smoking status: Never    Smokeless tobacco: Never   Substance and Sexual Activity    Alcohol use: No     Alcohol/week: 0.0 standard drinks    Drug use: No    Sexual activity: Not Currently         OBJECTIVE:     Vital Signs Range (Last 24H):  Temp:  [36.1 °C (97 °F)] 36.1 °C (97 °F)  Pulse:  [86] 86  Resp:  [16] 16  SpO2:  [96 %] 96 %  BP: (125)/(58) 125/58    Significant Labs:  Lab Results   Component Value Date    WBC 6.9 02/15/2023    HGB 10.6 (L) 02/15/2023    HCT 33.1 (L) 02/15/2023     02/15/2023    CHOL 145 08/01/2022    TRIG 178 (H) 08/01/2022    HDL 43 (L) 08/01/2022    ALT 11 02/15/2023    AST 16 02/15/2023     02/15/2023    K 4.2 02/15/2023     02/15/2023    CREATININE 1.42 (H) 02/15/2023    BUN 20 02/15/2023    CO2 29 02/15/2023    TSH 1.60 08/01/2022    INR 1.0 08/17/2016    HGBA1C 5.2 02/15/2016    MICROALBUR 0.2 08/01/2022       Diagnostic Studies:    EKG:   Results for orders placed or performed during the hospital encounter of 04/04/18   EKG 12-lead    Collection Time: 04/04/18  3:18 PM    Narrative    Test Reason : R10.13,  Vent. Rate : 075 BPM     Atrial Rate : 075 BPM     P-R Int : 152 ms          QRS Dur : 084 ms      QT Int : 382 ms       P-R-T Axes : 072 045 047 degrees     QTc Int : 426 ms    Normal sinus rhythm  Normal ECG  When compared with ECG of 02-APR-2018 20:27,  No significant change was found  Confirmed by Chan Solorio MD ([id number])  "on 4/4/2018 5:31:52 PM    Referred By: [email or web address]   SELF           Confirmed By:Chan Solorio MD       2D ECHO:  TTE:  No results found for this or any previous visit.  Results for orders placed or performed during the hospital encounter of 04/07/22   Echo   Result Value Ref Range    AV mean gradient 3 mmHg    Ao peak bobby 1.08 m/s    Ao VTI 20.37 cm    IVS 1.00 (A) 0.6 - 1.1 cm    LA size 3.01 cm    Left Atrium Major Axis 4.36 cm    Left Atrium Minor Axis 4.60 cm    LVIDd 4.20 3.5 - 6.0 cm    LVIDs 2.00 2.1 - 4.0 cm    LVOT diameter 1.99 cm    LVOT peak VTI 16.45 cm    Posterior Wall 1.00 (A) 0.6 - 1.1 cm    PV Peak D Bobby 0.40 m/s    PV Peak S Bobby 0.50 m/s    RA Major Axis 4.00 cm    RA Width 3.40 cm    RVDD 2.10 cm    Sinus 2.37 cm    TAPSE 1.46 cm    Ao root annulus 2.07 cm    AORTIC VALVE CUSP SEPERATION 1.61 cm    PV PEAK VELOCITY 0.84 cm/s    LV Diastolic Volume 64.74 mL    LV Systolic Volume 31.34 mL    LVOT peak bobby 0.90 m/s    LA WIDTH 3.50 cm    Mr max bobby 0.06 m/s    LA volume (mod) 35.00 cm3    MV "A" wave duration 9.46 msec    MV mean gradient 1 mmHg    MV peak gradient 6 mmHg    RV S' 7.27 cm/s    MV VTI 19.17 cm    TDI LATERAL 0.08 m/s    TDI SEPTAL 0.06 m/s    FS 52 %    LA volume 40.09 cm3    LV mass 137.25 g    Left Ventricle Relative Wall Thickness 0.48 cm    AV valve area 2.51 cm2    AV Velocity Ratio 0.83     AV index (prosthetic) 0.81     MV valve area by continuity eq 2.67 cm2    Mean e' 0.07 m/s    Pulm vein S/D ratio 1.25     LVOT area 3.1 cm2    LVOT stroke volume 51.14 cm3    AV peak gradient 5 mmHg    BSA 2.06 m2    LV Systolic Volume Index 15.8 mL/m2    LV Diastolic Volume Index 32.70 mL/m2    LA Volume Index 20.2 mL/m2    LV Mass Index 69 g/m2    LA Volume Index (Mod) 17.7 mL/m2    Right Atrial Pressure (from IVC) 3 mmHg    EF 55 %    Right ventricular length in diastole (apical 4-chamber view) 4.50 cm    Narrative    · The left ventricle is normal in size with concentric remodeling and "   normal systolic function.  · The estimated ejection fraction is 55%.  · Normal left ventricular diastolic function.  · Normal right ventricular size with normal right ventricular systolic   function.  · Mild-to-moderate mitral regurgitation.  · Normal central venous pressure (3 mmHg).              Pre-op Assessment    I have reviewed the Patient Summary Reports.     I have reviewed the Nursing Notes. I have reviewed the NPO Status.   I have reviewed the Medications.     Review of Systems  Anesthesia Hx:  No problems with previous Anesthesia  History of prior surgery of interest to airway management or planning:  Denies Personal Hx of Anesthesia complications.   Social:  Non-Smoker, No Alcohol Use    Cardiovascular:   Hypertension CAD   CHF Cardiomyopathy with recovered EF    Pulmonary:  Pulmonary Normal  Denies COPD.  Denies Asthma.  Denies Sleep Apnea.    Renal/:   Denies Chronic Renal Disease.     Hepatic/GI:   GERD Denies Liver Disease.    Musculoskeletal:   Arthritis  Rheumatoid arthritis   Denies cervical involvement    Neurological:  Neurology Normal Denies TIA.  Denies CVA. Denies Seizures.    Endocrine:   Denies Diabetes. Denies Hypothyroidism. Denies Hyperthyroidism. BMI 35.5 Obesity / BMI > 30      Physical Exam  General: Well nourished, Cooperative, Alert and Oriented    Airway:  Mallampati: II   Mouth Opening: Normal  TM Distance: Normal  Tongue: Normal  Neck ROM: Normal ROM    Dental:  Dentures, Edentulous        Anesthesia Plan  Type of Anesthesia, risks & benefits discussed:    Anesthesia Type: Gen Natural Airway  Intra-op Monitoring Plan: Standard ASA Monitors  Post Op Pain Control Plan: multimodal analgesia and IV/PO Opioids PRN  Induction:  IV  Informed Consent: Informed consent signed with the Patient and all parties understand the risks and agree with anesthesia plan.  All questions answered.   ASA Score: 3  Day of Surgery Review of History & Physical: H&P Update referred to the  surgeon/provider.    Ready For Surgery From Anesthesia Perspective.     .

## 2023-05-11 NOTE — ANESTHESIA POSTPROCEDURE EVALUATION
Anesthesia Post Evaluation    Patient: Christina Tomlinson    Procedure(s) Performed: Procedure(s) (LRB):  EGD (ESOPHAGOGASTRODUODENOSCOPY) (N/A)    Final Anesthesia Type: general      Patient location during evaluation: PACU  Patient participation: Yes- Able to Participate  Level of consciousness: awake  Post-procedure vital signs: reviewed and stable  Pain management: adequate  Airway patency: patent    PONV status at discharge: No PONV  Anesthetic complications: no      Cardiovascular status: blood pressure returned to baseline  Respiratory status: unassisted  Hydration status: euvolemic  Follow-up not needed.          Vitals Value Taken Time   /48 05/11/23 0918   Temp 37.2 °C (98.9 °F) 05/11/23 0857   Pulse 89 05/11/23 0918   Resp 18 05/11/23 0918   SpO2 99 % 05/11/23 0918         Event Time   Out of Recovery 09:46:31         Pain/Renee Score: Renee Score: 9 (5/11/2023  8:55 AM)

## 2023-05-17 LAB
FINAL PATHOLOGIC DIAGNOSIS: NORMAL
GROSS: NORMAL
Lab: NORMAL

## 2023-05-18 PROBLEM — R10.13 EPIGASTRIC PAIN: Status: ACTIVE | Noted: 2023-05-18

## 2023-05-18 PROBLEM — K29.70 GASTRITIS: Status: ACTIVE | Noted: 2023-05-18

## 2023-05-24 DIAGNOSIS — I11.0 CARDIOMYOPATHY DUE TO HYPERTENSION, WITH HEART FAILURE: ICD-10-CM

## 2023-05-24 DIAGNOSIS — I43 CARDIOMYOPATHY DUE TO HYPERTENSION, WITH HEART FAILURE: ICD-10-CM

## 2023-05-24 DIAGNOSIS — R00.0 TACHYCARDIA: ICD-10-CM

## 2023-05-25 RX ORDER — METOPROLOL SUCCINATE 50 MG/1
TABLET, EXTENDED RELEASE ORAL
Qty: 180 TABLET | Refills: 0 | Status: SHIPPED | OUTPATIENT
Start: 2023-05-25 | End: 2023-08-23 | Stop reason: SDUPTHER

## 2023-05-27 LAB
ALBUMIN SERPL-MCNC: 3.5 G/DL (ref 3.6–5.1)
ALBUMIN/GLOB SERPL: 1.4 (CALC) (ref 1–2.5)
ALP SERPL-CCNC: 50 U/L (ref 37–153)
ALT SERPL-CCNC: 10 U/L (ref 6–29)
APPEARANCE UR: CLEAR
AST SERPL-CCNC: 16 U/L (ref 10–35)
BACTERIA #/AREA URNS HPF: ABNORMAL /HPF
BACTERIA UR CULT: NORMAL
BILIRUB SERPL-MCNC: 0.3 MG/DL (ref 0.2–1.2)
BILIRUB UR QL STRIP: NEGATIVE
BUN SERPL-MCNC: 16 MG/DL (ref 7–25)
BUN/CREAT SERPL: 15 (CALC) (ref 6–22)
CALCIUM SERPL-MCNC: 9 MG/DL (ref 8.6–10.4)
CHLORIDE SERPL-SCNC: 104 MMOL/L (ref 98–110)
CO2 SERPL-SCNC: 31 MMOL/L (ref 20–32)
COLOR UR: YELLOW
CREAT SERPL-MCNC: 1.1 MG/DL (ref 0.5–1.05)
EGFR: 55 ML/MIN/1.73M2
GLOBULIN SER CALC-MCNC: 2.5 G/DL (CALC) (ref 1.9–3.7)
GLUCOSE SERPL-MCNC: 108 MG/DL (ref 65–99)
GLUCOSE UR QL STRIP: NEGATIVE
HGB UR QL STRIP: NEGATIVE
HYALINE CASTS #/AREA URNS LPF: ABNORMAL /LPF
KETONES UR QL STRIP: NEGATIVE
LEUKOCYTE ESTERASE UR QL STRIP: ABNORMAL
MAGNESIUM SERPL-MCNC: 1.6 MG/DL (ref 1.5–2.5)
NITRITE UR QL STRIP: NEGATIVE
PH UR STRIP: 5.5 [PH] (ref 5–8)
PHOSPHATE SERPL-MCNC: 3 MG/DL (ref 2.1–4.3)
POTASSIUM SERPL-SCNC: 3.4 MMOL/L (ref 3.5–5.3)
PROT SERPL-MCNC: 6 G/DL (ref 6.1–8.1)
PROT UR QL STRIP: NEGATIVE
RBC #/AREA URNS HPF: ABNORMAL /HPF
SERVICE CMNT-IMP: ABNORMAL
SODIUM SERPL-SCNC: 142 MMOL/L (ref 135–146)
SP GR UR STRIP: 1.01 (ref 1–1.03)
SQUAMOUS #/AREA URNS HPF: ABNORMAL /HPF
WBC #/AREA URNS HPF: ABNORMAL /HPF

## 2023-06-08 ENCOUNTER — OFFICE VISIT (OUTPATIENT)
Dept: NEPHROLOGY | Facility: CLINIC | Age: 68
End: 2023-06-08
Payer: MEDICARE

## 2023-06-08 VITALS
BODY MASS INDEX: 35.9 KG/M2 | DIASTOLIC BLOOD PRESSURE: 81 MMHG | HEIGHT: 64 IN | HEART RATE: 87 BPM | SYSTOLIC BLOOD PRESSURE: 134 MMHG | WEIGHT: 210.31 LBS

## 2023-06-08 DIAGNOSIS — M06.9 RHEUMATOID ARTHRITIS, INVOLVING UNSPECIFIED SITE, UNSPECIFIED WHETHER RHEUMATOID FACTOR PRESENT: ICD-10-CM

## 2023-06-08 DIAGNOSIS — K21.9 GASTROESOPHAGEAL REFLUX DISEASE WITHOUT ESOPHAGITIS: ICD-10-CM

## 2023-06-08 DIAGNOSIS — I10 HTN (HYPERTENSION), BENIGN: ICD-10-CM

## 2023-06-08 DIAGNOSIS — N18.31 STAGE 3A CHRONIC KIDNEY DISEASE: Primary | ICD-10-CM

## 2023-06-08 PROCEDURE — 1101F PT FALLS ASSESS-DOCD LE1/YR: CPT | Mod: CPTII,S$GLB,, | Performed by: INTERNAL MEDICINE

## 2023-06-08 PROCEDURE — 3066F NEPHROPATHY DOC TX: CPT | Mod: CPTII,S$GLB,, | Performed by: INTERNAL MEDICINE

## 2023-06-08 PROCEDURE — 3288F FALL RISK ASSESSMENT DOCD: CPT | Mod: CPTII,S$GLB,, | Performed by: INTERNAL MEDICINE

## 2023-06-08 PROCEDURE — 1101F PR PT FALLS ASSESS DOC 0-1 FALLS W/OUT INJ PAST YR: ICD-10-PCS | Mod: CPTII,S$GLB,, | Performed by: INTERNAL MEDICINE

## 2023-06-08 PROCEDURE — 99999 PR PBB SHADOW E&M-EST. PATIENT-LVL III: CPT | Mod: PBBFAC,,, | Performed by: INTERNAL MEDICINE

## 2023-06-08 PROCEDURE — 3008F PR BODY MASS INDEX (BMI) DOCUMENTED: ICD-10-PCS | Mod: CPTII,S$GLB,, | Performed by: INTERNAL MEDICINE

## 2023-06-08 PROCEDURE — 99214 PR OFFICE/OUTPT VISIT, EST, LEVL IV, 30-39 MIN: ICD-10-PCS | Mod: S$GLB,,, | Performed by: INTERNAL MEDICINE

## 2023-06-08 PROCEDURE — 99999 PR PBB SHADOW E&M-EST. PATIENT-LVL III: ICD-10-PCS | Mod: PBBFAC,,, | Performed by: INTERNAL MEDICINE

## 2023-06-08 PROCEDURE — 3079F PR MOST RECENT DIASTOLIC BLOOD PRESSURE 80-89 MM HG: ICD-10-PCS | Mod: CPTII,S$GLB,, | Performed by: INTERNAL MEDICINE

## 2023-06-08 PROCEDURE — 3075F SYST BP GE 130 - 139MM HG: CPT | Mod: CPTII,S$GLB,, | Performed by: INTERNAL MEDICINE

## 2023-06-08 PROCEDURE — 1126F AMNT PAIN NOTED NONE PRSNT: CPT | Mod: CPTII,S$GLB,, | Performed by: INTERNAL MEDICINE

## 2023-06-08 PROCEDURE — 3079F DIAST BP 80-89 MM HG: CPT | Mod: CPTII,S$GLB,, | Performed by: INTERNAL MEDICINE

## 2023-06-08 PROCEDURE — 3288F PR FALLS RISK ASSESSMENT DOCUMENTED: ICD-10-PCS | Mod: CPTII,S$GLB,, | Performed by: INTERNAL MEDICINE

## 2023-06-08 PROCEDURE — 3075F PR MOST RECENT SYSTOLIC BLOOD PRESS GE 130-139MM HG: ICD-10-PCS | Mod: CPTII,S$GLB,, | Performed by: INTERNAL MEDICINE

## 2023-06-08 PROCEDURE — 4010F ACE/ARB THERAPY RXD/TAKEN: CPT | Mod: CPTII,S$GLB,, | Performed by: INTERNAL MEDICINE

## 2023-06-08 PROCEDURE — 99214 OFFICE O/P EST MOD 30 MIN: CPT | Mod: S$GLB,,, | Performed by: INTERNAL MEDICINE

## 2023-06-08 PROCEDURE — 4010F PR ACE/ARB THEARPY RXD/TAKEN: ICD-10-PCS | Mod: CPTII,S$GLB,, | Performed by: INTERNAL MEDICINE

## 2023-06-08 PROCEDURE — 3066F PR DOCUMENTATION OF TREATMENT FOR NEPHROPATHY: ICD-10-PCS | Mod: CPTII,S$GLB,, | Performed by: INTERNAL MEDICINE

## 2023-06-08 PROCEDURE — 1126F PR PAIN SEVERITY QUANTIFIED, NO PAIN PRESENT: ICD-10-PCS | Mod: CPTII,S$GLB,, | Performed by: INTERNAL MEDICINE

## 2023-06-08 PROCEDURE — 3008F BODY MASS INDEX DOCD: CPT | Mod: CPTII,S$GLB,, | Performed by: INTERNAL MEDICINE

## 2023-06-08 RX ORDER — TERBINAFINE HYDROCHLORIDE 250 MG/1
250 TABLET ORAL DAILY
COMMUNITY

## 2023-06-08 NOTE — PROGRESS NOTES
Name: Christina Tomlinson  Medical Record Number: [id number]  Date of Service: 06/08/2023  Note By: Frederic Patrick DO    [facility]phrology Consult    Reason for Consultation: Renal Failure  Referring Provider: SYD Temple     History of Present Illness:  Christina Tomlinson is a 67 y.o. female with past medical history of CKD 3 who presents for follow-up.  Without c/o CP, Palpitations, SOB, REYNA, N/V, C/F. No foam or blood in urine, no dysuria. No EUSEBIO, LOS, LOT. Sts had COVID vaccine. Sts had booster shot today. Denies any stone passage, flank pain. Sts has had episodes of acid reflux and abdominal pain and was evaluated by Dr. Claire and placed on Protonix. Prilosec was stopped.     History of CKD3  Nephrologist RADHA Patrick  Access n/a?  Dialysis center n/a?       Past Medical History:  Past Medical History:   Diagnosis Date    Arthritis     Cardiomyopathy     CHF (congestive heart failure)     Coronary artery disease     GERD (gastroesophageal reflux disease)     Hypertension        Past Surgical History:  Past Surgical History:   Procedure Laterality Date    CHOLECYSTECTOMY      COLONOSCOPY N/A 2/1/2016    Procedure: COLONOSCOPY;  Surgeon: Aidan Reynoso Jr., MD;  Location: [facility];  Service: Endoscopy;  Laterality: N/A;    CYSTOSCOPY  10/5/2018    Procedure: CYSTOSCOPY;  Surgeon: Everette Quiroz MD;  Location: [address];  Service: Urology;;    ESOPHAGOGASTRODUODENOSCOPY N/A 5/11/2023    Procedure: EGD (ESOPHAGOGASTRODUODENOSCOPY);  Surgeon: Curt Claire MD;  Location: [facility];  Service: Endoscopy;  Laterality: N/A;    HYSTERECTOMY      JOINT REPLACEMENT Right     TKA    KNEE ARTHROSCOPY      LASER LITHOTRIPSY Left 10/5/2018    Procedure: LITHOTRIPSY, USING LASER;  Surgeon: Everette Quiroz MD;  Location: [address];  Service: Urology;  Laterality: Left;    OTHER SURGICAL HISTORY      PARATHYROIDECTOMY      RETROGRADE PYELOGRAPHY Left 10/5/2018    Procedure: PYELOGRAM, RETROGRADE;   Surgeon: Everette Quiroz MD;  Location: [facility] OR [address] [place] [address];  Service: Urology;  Laterality: Left;    TONSILLECTOMY      URETEROSCOPY Left 10/5/2018    Procedure: URETEROSCOPY;  Surgeon: Everette Quiroz MD;  Location: [facility] OR [address] [place] [address];  Service: Urology;  Laterality: Left;  90 min       Family History:  Family History   Problem Relation Age of Onset    Diabetes Father     Kidney disease Father     Heart failure Mother     Heart disease Mother        Social History:  Social History     Socioeconomic History    Marital status:    Tobacco Use    Smoking status: Never    Smokeless tobacco: Never   Substance and Sexual Activity    Alcohol use: No     Alcohol/week: 0.0 standard drinks    Drug use: No    Sexual activity: Not Currently       Home Medications:   (Not in a hospital admission)      Allergies:  Lisinopril and Prochlorperazine    Review of Systems:  10 point review of systems was conducted and was negative except as mentioned in the HPI.    Physical Exam:  Vitals:  Vitals:    06/08/23 1402   BP: 134/81   Pulse: 87     [unfilled]      Exam  General: No acute distress, well groomed, alert and oriented x 3  HEENT: Normocephalic, atraumatic, EOM's intact bilaterally, external inspection of ears and nose normal, moist mucous membranes, no oral ulcerations/lesions  Neck: Supple, symmetrical, trachea midline, no thyromegaly, no JVD  Respiratory: Clear to auscultation bilaterally, respirations unlabored, no rales/rhonchi/wheezing  Cardiovacular: Regular rate and rhythm, S1, S2 normal, no murmurs, rubs or gallops  Gastrointestinal: Soft, non-tender, bowel sounds normal, no hepatosplenomegaly  Musculoskeletal: No knee or ankle joint tenderness or swelling.   Extremities: No clubbing or cyanosis of bilateral upper extremities; no lower extremity edema bilaterally, radial pulses 2+ bilaterally, symmetric  Skin: warm and dry; no rash on exposed skin  Neurologic: CN grossly intact    Labs:  A1C:      CBC:  Recent  Labs   Lab 02/02/22  1443 08/01/22  0814 02/15/23  1403   WBC 5.65 6.1 6.9   RBC 3.62 L 3.79 L 3.68 L   Hemoglobin 10.5 L 11.1 L 10.6 L   Hematocrit 33.1 L 34.0 L 33.1 L   Platelets 219 212 225   MCV 91 89.7 89.9   MCH 29.0 29.3 28.8   MCHC 31.7 L 32.6 32.0     CMP:  Recent Labs   Lab 02/02/22  1443 08/01/22  0814 05/25/23  0819   Glucose 108   < > 108 H   Calcium 9.2   < > 9.0   Albumin 3.8   < > 3.5 L   Total Protein 7.1   < > 6.0 L   Sodium 140   < > 142   Potassium 3.7   < > 3.4 L   CO2 32 H   < > 31   Chloride 101   < > 104   BUN 17   < > 16   Creatinine 1.08   < > 1.10 H   Alkaline Phosphatase 68  --   --    ALT 17   < > 10   AST 30   < > 16   Total Bilirubin 0.4   < > 0.3   eGFR if  >60.0  --   --     < > = values in this interval not displayed.     LIPIDS:  Recent Labs   Lab 02/11/21  1033 07/09/21  1204 08/01/22  0814   TSH 1.210 2.18 1.60   HDL  --  46 L 43 L   Cholesterol  --  139 145   Triglycerides  --  134 178 H   LDL Cholesterol  --  71 75   HDL/Cholesterol Ratio  --  3.0 3.4   Non HDL Chol. (LDL+VLDL)  --  93 102     TSH:  Recent Labs   Lab 02/11/21  1033 07/09/21  1204 08/01/22  0814   TSH 1.210 2.18 1.60     URINALYSIS:  Recent Labs   Lab Result Units 05/25/23  0819   Color, UA  YELLOW   Specific Gravity, UA  1.012   pH, UA  5.5   Protein, UA  NEGATIVE   Glucose, UA  NEGATIVE   Bacteria, UA /HPF FEW*   Nitrite, UA  NEGATIVE   Leukocytes, UA  2+*   Hyaline Casts, UA /LPF NONE SEEN        Lab Results   Component Value Date    WBC 6.9 02/15/2023    HGB 10.6 (L) 02/15/2023    HCT 33.1 (L) 02/15/2023     05/25/2023    K 3.4 (L) 05/25/2023     05/25/2023    CO2 31 05/25/2023    BUN 16 05/25/2023    CREATININE 1.10 (H) 05/25/2023    EGFRNONAA 53.2 (A) 02/02/2022    CALCIUM 9.0 05/25/2023    PHOS 3.9 02/02/2022    MG 1.6 05/25/2023    ALBUMIN 3.5 (L) 05/25/2023    AST 16 05/25/2023    ALT 10 05/25/2023       No results found for: EXTANC, EXTWBC, EXTSEGS, EXTPLATELETS,  EXTHEMOGLOBI, EXTHEMATOCRI, EXTCREATININ, EXTSODIUM, EXTPOTASSIUM, EXTBUN, EXTCO2, EXTCALCIUM, EXTPHOSPHORU, EXTGLUCOSE, EXTALBUMIN, EXTAST, EXTALT, EXTBILITOTAL, EXTLIPASE, EXTAMYLASE    No results found for: EXTCYCLOSLVL, EXTSIROLIMUS, EXTTACROLVL, EXTPROTCRE, EXTPTHINTACT, EXTPROTEINUA, EXTWBCUA, EXTRBCUA    Imaging Studies: n/a  ?    Assessment/Plan:  65 y.o. female with:     1- CKD 3a - Renal function/GFR is better @ 55 ml/min  Avoid dehydration and NSAIDS.  She is non-oliguric. She has trace proteinuria. Hx of PTHx for PHPTH in 2015. Plan to check level.     2. Hypertension - is well controlled. Sts is well controlled at home.      3. Rheumatoid arthritis - F/U with Dr. Frye - Seen today. Avoid NSAIDs or other known nephrotoxic medications. Sts having (L) hand pain. Will see Dr. Frye on Monday.     4. Coronary artery disease - F/U with Dr. Talamantes in [place].     5. Anemia - Stable H/H noted  in February. Encourage iron intake but MCV is dropping but still in normal range.     6. Glaucoma - Using eyedrops Brimonidine tartrate.        Frederic Patrick DO  [facility]

## 2023-07-28 RX ORDER — ATORVASTATIN CALCIUM 40 MG/1
TABLET, FILM COATED ORAL
Qty: 90 TABLET | Refills: 0 | Status: SHIPPED | OUTPATIENT
Start: 2023-07-28 | End: 2023-11-02 | Stop reason: SDUPTHER

## 2023-07-31 RX ORDER — FUROSEMIDE 40 MG/1
TABLET ORAL
Qty: 90 TABLET | Refills: 0 | Status: SHIPPED | OUTPATIENT
Start: 2023-07-31 | End: 2023-11-01 | Stop reason: SDUPTHER

## 2023-08-23 DIAGNOSIS — I43 CARDIOMYOPATHY DUE TO HYPERTENSION, WITH HEART FAILURE: ICD-10-CM

## 2023-08-23 DIAGNOSIS — I11.0 CARDIOMYOPATHY DUE TO HYPERTENSION, WITH HEART FAILURE: ICD-10-CM

## 2023-08-23 DIAGNOSIS — R00.0 TACHYCARDIA: ICD-10-CM

## 2023-08-23 RX ORDER — METOPROLOL SUCCINATE 50 MG/1
50 TABLET, EXTENDED RELEASE ORAL 2 TIMES DAILY
Qty: 180 TABLET | Refills: 0 | Status: SHIPPED | OUTPATIENT
Start: 2023-08-23 | End: 2023-11-27

## 2023-08-24 DIAGNOSIS — M25.562 LEFT KNEE PAIN, UNSPECIFIED CHRONICITY: ICD-10-CM

## 2023-08-24 DIAGNOSIS — Z96.651 AFTERCARE FOLLOWING RIGHT KNEE JOINT REPLACEMENT SURGERY: Primary | ICD-10-CM

## 2023-08-24 DIAGNOSIS — Z47.1 AFTERCARE FOLLOWING RIGHT KNEE JOINT REPLACEMENT SURGERY: Primary | ICD-10-CM

## 2023-08-24 DIAGNOSIS — M17.11 PRIMARY OSTEOARTHRITIS OF RIGHT KNEE: ICD-10-CM

## 2023-08-29 ENCOUNTER — OFFICE VISIT (OUTPATIENT)
Dept: ORTHOPEDICS | Facility: CLINIC | Age: 68
End: 2023-08-29
Payer: MEDICARE

## 2023-08-29 ENCOUNTER — HOSPITAL ENCOUNTER (OUTPATIENT)
Dept: RADIOLOGY | Facility: HOSPITAL | Age: 68
Discharge: HOME | End: 2023-08-29
Attending: ORTHOPAEDIC SURGERY
Payer: MEDICARE

## 2023-08-29 VITALS
BODY MASS INDEX: 36.25 KG/M2 | WEIGHT: 212.31 LBS | SYSTOLIC BLOOD PRESSURE: 147 MMHG | HEART RATE: 103 BPM | HEIGHT: 64 IN | DIASTOLIC BLOOD PRESSURE: 82 MMHG

## 2023-08-29 DIAGNOSIS — Z96.652 AFTERCARE FOLLOWING LEFT KNEE JOINT REPLACEMENT SURGERY: Primary | ICD-10-CM

## 2023-08-29 DIAGNOSIS — M17.11 PRIMARY OSTEOARTHRITIS OF RIGHT KNEE: ICD-10-CM

## 2023-08-29 DIAGNOSIS — Z47.1 AFTERCARE FOLLOWING RIGHT KNEE JOINT REPLACEMENT SURGERY: ICD-10-CM

## 2023-08-29 DIAGNOSIS — Z47.1 AFTERCARE FOLLOWING LEFT KNEE JOINT REPLACEMENT SURGERY: Primary | ICD-10-CM

## 2023-08-29 DIAGNOSIS — Z96.651 AFTERCARE FOLLOWING RIGHT KNEE JOINT REPLACEMENT SURGERY: ICD-10-CM

## 2023-08-29 DIAGNOSIS — M25.562 LEFT KNEE PAIN, UNSPECIFIED CHRONICITY: ICD-10-CM

## 2023-08-29 PROCEDURE — 3077F PR MOST RECENT SYSTOLIC BLOOD PRESSURE >= 140 MM HG: ICD-10-PCS | Mod: CPTII,S$GLB,, | Performed by: ORTHOPAEDIC SURGERY

## 2023-08-29 PROCEDURE — 4010F PR ACE/ARB THEARPY RXD/TAKEN: ICD-10-PCS | Mod: CPTII,S$GLB,, | Performed by: ORTHOPAEDIC SURGERY

## 2023-08-29 PROCEDURE — 73562 PR  X-RAY KNEE 3 VIEW: ICD-10-PCS | Mod: 26,LT,, | Performed by: RADIOLOGY

## 2023-08-29 PROCEDURE — 1159F MED LIST DOCD IN RCRD: CPT | Mod: CPTII,S$GLB,, | Performed by: ORTHOPAEDIC SURGERY

## 2023-08-29 PROCEDURE — 3288F PR FALLS RISK ASSESSMENT DOCUMENTED: ICD-10-PCS | Mod: CPTII,S$GLB,, | Performed by: ORTHOPAEDIC SURGERY

## 2023-08-29 PROCEDURE — 1101F PT FALLS ASSESS-DOCD LE1/YR: CPT | Mod: CPTII,S$GLB,, | Performed by: ORTHOPAEDIC SURGERY

## 2023-08-29 PROCEDURE — 4010F ACE/ARB THERAPY RXD/TAKEN: CPT | Mod: CPTII,S$GLB,, | Performed by: ORTHOPAEDIC SURGERY

## 2023-08-29 PROCEDURE — 77073 BONE LENGTH STUDIES: CPT | Mod: TC,PN

## 2023-08-29 PROCEDURE — 3008F BODY MASS INDEX DOCD: CPT | Mod: CPTII,S$GLB,, | Performed by: ORTHOPAEDIC SURGERY

## 2023-08-29 PROCEDURE — 3008F PR BODY MASS INDEX (BMI) DOCUMENTED: ICD-10-PCS | Mod: CPTII,S$GLB,, | Performed by: ORTHOPAEDIC SURGERY

## 2023-08-29 PROCEDURE — 99214 PR OFFICE/OUTPT VISIT, EST, LEVL IV, 30-39 MIN: ICD-10-PCS | Mod: S$GLB,,, | Performed by: ORTHOPAEDIC SURGERY

## 2023-08-29 PROCEDURE — 77073 XR HIP TO ANKLE: ICD-10-PCS | Mod: 26,59,, | Performed by: RADIOLOGY

## 2023-08-29 PROCEDURE — 1101F PR PT FALLS ASSESS DOC 0-1 FALLS W/OUT INJ PAST YR: ICD-10-PCS | Mod: CPTII,S$GLB,, | Performed by: ORTHOPAEDIC SURGERY

## 2023-08-29 PROCEDURE — 3288F FALL RISK ASSESSMENT DOCD: CPT | Mod: CPTII,S$GLB,, | Performed by: ORTHOPAEDIC SURGERY

## 2023-08-29 PROCEDURE — 3077F SYST BP >= 140 MM HG: CPT | Mod: CPTII,S$GLB,, | Performed by: ORTHOPAEDIC SURGERY

## 2023-08-29 PROCEDURE — 73562 X-RAY EXAM OF KNEE 3: CPT | Mod: 26,RT,, | Performed by: RADIOLOGY

## 2023-08-29 PROCEDURE — 99214 OFFICE O/P EST MOD 30 MIN: CPT | Mod: S$GLB,,, | Performed by: ORTHOPAEDIC SURGERY

## 2023-08-29 PROCEDURE — 1126F PR PAIN SEVERITY QUANTIFIED, NO PAIN PRESENT: ICD-10-PCS | Mod: CPTII,S$GLB,, | Performed by: ORTHOPAEDIC SURGERY

## 2023-08-29 PROCEDURE — 3079F PR MOST RECENT DIASTOLIC BLOOD PRESSURE 80-89 MM HG: ICD-10-PCS | Mod: CPTII,S$GLB,, | Performed by: ORTHOPAEDIC SURGERY

## 2023-08-29 PROCEDURE — 3079F DIAST BP 80-89 MM HG: CPT | Mod: CPTII,S$GLB,, | Performed by: ORTHOPAEDIC SURGERY

## 2023-08-29 PROCEDURE — 77073 BONE LENGTH STUDIES: CPT | Mod: 26,59,, | Performed by: RADIOLOGY

## 2023-08-29 PROCEDURE — 3066F NEPHROPATHY DOC TX: CPT | Mod: CPTII,S$GLB,, | Performed by: ORTHOPAEDIC SURGERY

## 2023-08-29 PROCEDURE — 99999 PR PBB SHADOW E&M-EST. PATIENT-LVL III: ICD-10-PCS | Mod: PBBFAC,,, | Performed by: ORTHOPAEDIC SURGERY

## 2023-08-29 PROCEDURE — 1159F PR MEDICATION LIST DOCUMENTED IN MEDICAL RECORD: ICD-10-PCS | Mod: CPTII,S$GLB,, | Performed by: ORTHOPAEDIC SURGERY

## 2023-08-29 PROCEDURE — 99999 PR PBB SHADOW E&M-EST. PATIENT-LVL III: CPT | Mod: PBBFAC,,, | Performed by: ORTHOPAEDIC SURGERY

## 2023-08-29 PROCEDURE — 3066F PR DOCUMENTATION OF TREATMENT FOR NEPHROPATHY: ICD-10-PCS | Mod: CPTII,S$GLB,, | Performed by: ORTHOPAEDIC SURGERY

## 2023-08-29 PROCEDURE — 73562 X-RAY EXAM OF KNEE 3: CPT | Mod: 26,LT,, | Performed by: RADIOLOGY

## 2023-08-29 PROCEDURE — 73562 X-RAY EXAM OF KNEE 3: CPT | Mod: TC,50,PN

## 2023-08-29 PROCEDURE — 1126F AMNT PAIN NOTED NONE PRSNT: CPT | Mod: CPTII,S$GLB,, | Performed by: ORTHOPAEDIC SURGERY

## 2023-08-29 NOTE — PROGRESS NOTES
[facility] [address]          Subjective:      Patient ID: Christina Tomlinson is a 67 y.o. female.    Chief Complaint: Follow-up of the Right Knee    Patient is 7 years s/p  right primary total knee replacement  Anterior knee pain: No  Has improved pain  Is in physical therapy  No  Problems w incision  No  Is  happy with result  Yes  Opiod free: Yes       Past Medical History:   Diagnosis Date    Arthritis     Cardiomyopathy     CHF (congestive heart failure)     Coronary artery disease     GERD (gastroesophageal reflux disease)     Hypertension         Past Surgical History:   Procedure Laterality Date    CHOLECYSTECTOMY      COLONOSCOPY N/A 2/1/2016    Procedure: COLONOSCOPY;  Surgeon: Aidan Reynoso Jr., MD;  Location: [facility];  Service: Endoscopy;  Laterality: N/A;    CYSTOSCOPY  10/5/2018    Procedure: CYSTOSCOPY;  Surgeon: Everette Quiroz MD;  Location: [address];  Service: Urology;;    ESOPHAGOGASTRODUODENOSCOPY N/A 5/11/2023    Procedure: EGD (ESOPHAGOGASTRODUODENOSCOPY);  Surgeon: Curt Claire MD;  Location: [facility];  Service: Endoscopy;  Laterality: N/A;    HYSTERECTOMY      JOINT REPLACEMENT Right     TKA    KNEE ARTHROSCOPY      LASER LITHOTRIPSY Left 10/5/2018    Procedure: LITHOTRIPSY, USING LASER;  Surgeon: Everette Quiroz MD;  Location: [address];  Service: Urology;  Laterality: Left;    OTHER SURGICAL HISTORY      PARATHYROIDECTOMY      RETROGRADE PYELOGRAPHY Left 10/5/2018    Procedure: PYELOGRAM, RETROGRADE;  Surgeon: Everette Quiroz MD;  Location: [address];  Service: Urology;  Laterality: Left;    TONSILLECTOMY      URETEROSCOPY Left 10/5/2018    Procedure: URETEROSCOPY;  Surgeon: Everette Quiroz MD;  Location: [address];  Service: Urology;  Laterality: Left;  90 min        Current Outpatient Medications   Medication Instructions    acetaminophen (TYLENOL) 500 mg, Oral, Every 6 hours PRN    amitriptyline (ELAVIL) 25 mg, Oral, Nightly PRN     aspirin (ECOTRIN) 81 mg, Oral, Once    atorvastatin (LIPITOR) 40 MG tablet Take 1 tablet by mouth once daily    brimonidine 0.15 % OPTH DROP (ALPHAGAN) 0.15 % ophthalmic solution 1 drop, 3 times daily    cholecalciferol, vitamin D3, 3,000 unit Tab Oral    ferrous gluconate (FERGON) 480 mg, Oral, 3 times daily    furosemide (LASIX) 40 MG tablet Take 1 tablet by mouth once daily    hydrOXYchloroQUINE (PLAQUENIL) 200 mg, Oral, 2 times daily    leflunomide (ARAVA) 20 mg, Oral, Daily    metoprolol succinate (TOPROL-XL) 50 mg, Oral, 2 times daily    pantoprazole (PROTONIX) 40 mg, Oral, Daily    terbinafine HCL (LAMISIL) 250 mg, Oral, Daily    valsartan (DIOVAN) 160 mg, Oral, Daily        Review of patient's allergies indicates:   Allergen Reactions    Lisinopril Edema    Prochlorperazine Anxiety       Social History     Socioeconomic History    Marital status:    Tobacco Use    Smoking status: Never    Smokeless tobacco: Never   Substance and Sexual Activity    Alcohol use: No     Alcohol/week: 0.0 standard drinks of alcohol    Drug use: No    Sexual activity: Not Currently       Family History   Problem Relation Age of Onset    Diabetes Father     Kidney disease Father     Heart failure Mother     Heart disease Mother                    Objective:        Right knee   Well-healed surgical incision   No tenderness to palpation   Range of motion from 0-120 degrees   Neurovascularly intact          Imaging: implants in appropriate position and alignment with no issues at this time, no signs of loosening or periprosthetic fracture     Assessment:        Christina Tomlinson is a 67 y.o. female who is now 7 years status post R TKA.  The patient is currently doing well.    No diagnosis found.    Plan :        Overall patient appears to be doing well and is happy with the result of the knee arthroplasty. They can continue activities as tolerated avoiding high impact activities.  I would like to see the patient back In 2  years with xrays.        No orders of the defined types were placed in this encounter.       Curt Olmstead MD   08/29/2023

## 2023-09-11 ENCOUNTER — OFFICE VISIT (OUTPATIENT)
Dept: CARDIOLOGY | Facility: CLINIC | Age: 68
End: 2023-09-11
Payer: MEDICARE

## 2023-09-11 VITALS
HEART RATE: 89 BPM | WEIGHT: 213.88 LBS | SYSTOLIC BLOOD PRESSURE: 117 MMHG | HEIGHT: 64 IN | BODY MASS INDEX: 36.51 KG/M2 | OXYGEN SATURATION: 98 % | DIASTOLIC BLOOD PRESSURE: 65 MMHG

## 2023-09-11 DIAGNOSIS — I42.9 CARDIOMYOPATHY, UNSPECIFIED TYPE: ICD-10-CM

## 2023-09-11 DIAGNOSIS — I34.0 MODERATE MITRAL INSUFFICIENCY: ICD-10-CM

## 2023-09-11 DIAGNOSIS — E66.01 MORBID (SEVERE) OBESITY DUE TO EXCESS CALORIES: ICD-10-CM

## 2023-09-11 DIAGNOSIS — E66.9 OBESITY (BMI 30-39.9): Chronic | ICD-10-CM

## 2023-09-11 DIAGNOSIS — I43 CARDIOMYOPATHY DUE TO HYPERTENSION, WITH HEART FAILURE: Primary | ICD-10-CM

## 2023-09-11 DIAGNOSIS — I11.0 CARDIOMYOPATHY DUE TO HYPERTENSION, WITH HEART FAILURE: Primary | ICD-10-CM

## 2023-09-11 DIAGNOSIS — I25.10 CORONARY ARTERY DISEASE INVOLVING NATIVE CORONARY ARTERY OF NATIVE HEART WITHOUT ANGINA PECTORIS: ICD-10-CM

## 2023-09-11 DIAGNOSIS — I25.119 CORONARY ARTERY DISEASE INVOLVING NATIVE CORONARY ARTERY OF NATIVE HEART WITH ANGINA PECTORIS: ICD-10-CM

## 2023-09-11 DIAGNOSIS — I10 ESSENTIAL HYPERTENSION: ICD-10-CM

## 2023-09-11 PROCEDURE — 3078F PR MOST RECENT DIASTOLIC BLOOD PRESSURE < 80 MM HG: ICD-10-PCS | Mod: CPTII,S$GLB,, | Performed by: INTERNAL MEDICINE

## 2023-09-11 PROCEDURE — 1101F PR PT FALLS ASSESS DOC 0-1 FALLS W/OUT INJ PAST YR: ICD-10-PCS | Mod: CPTII,S$GLB,, | Performed by: INTERNAL MEDICINE

## 2023-09-11 PROCEDURE — 99999 PR PBB SHADOW E&M-EST. PATIENT-LVL III: CPT | Mod: PBBFAC,,, | Performed by: INTERNAL MEDICINE

## 2023-09-11 PROCEDURE — 99214 PR OFFICE/OUTPT VISIT, EST, LEVL IV, 30-39 MIN: ICD-10-PCS | Mod: S$GLB,,, | Performed by: INTERNAL MEDICINE

## 2023-09-11 PROCEDURE — 1126F PR PAIN SEVERITY QUANTIFIED, NO PAIN PRESENT: ICD-10-PCS | Mod: CPTII,S$GLB,, | Performed by: INTERNAL MEDICINE

## 2023-09-11 PROCEDURE — 1159F MED LIST DOCD IN RCRD: CPT | Mod: CPTII,S$GLB,, | Performed by: INTERNAL MEDICINE

## 2023-09-11 PROCEDURE — 3066F PR DOCUMENTATION OF TREATMENT FOR NEPHROPATHY: ICD-10-PCS | Mod: CPTII,S$GLB,, | Performed by: INTERNAL MEDICINE

## 2023-09-11 PROCEDURE — 99999 PR PBB SHADOW E&M-EST. PATIENT-LVL III: ICD-10-PCS | Mod: PBBFAC,,, | Performed by: INTERNAL MEDICINE

## 2023-09-11 PROCEDURE — 3288F FALL RISK ASSESSMENT DOCD: CPT | Mod: CPTII,S$GLB,, | Performed by: INTERNAL MEDICINE

## 2023-09-11 PROCEDURE — 1101F PT FALLS ASSESS-DOCD LE1/YR: CPT | Mod: CPTII,S$GLB,, | Performed by: INTERNAL MEDICINE

## 2023-09-11 PROCEDURE — 99214 OFFICE O/P EST MOD 30 MIN: CPT | Mod: S$GLB,,, | Performed by: INTERNAL MEDICINE

## 2023-09-11 PROCEDURE — 1159F PR MEDICATION LIST DOCUMENTED IN MEDICAL RECORD: ICD-10-PCS | Mod: CPTII,S$GLB,, | Performed by: INTERNAL MEDICINE

## 2023-09-11 PROCEDURE — 3074F PR MOST RECENT SYSTOLIC BLOOD PRESSURE < 130 MM HG: ICD-10-PCS | Mod: CPTII,S$GLB,, | Performed by: INTERNAL MEDICINE

## 2023-09-11 PROCEDURE — 1126F AMNT PAIN NOTED NONE PRSNT: CPT | Mod: CPTII,S$GLB,, | Performed by: INTERNAL MEDICINE

## 2023-09-11 PROCEDURE — 3078F DIAST BP <80 MM HG: CPT | Mod: CPTII,S$GLB,, | Performed by: INTERNAL MEDICINE

## 2023-09-11 PROCEDURE — 3008F PR BODY MASS INDEX (BMI) DOCUMENTED: ICD-10-PCS | Mod: CPTII,S$GLB,, | Performed by: INTERNAL MEDICINE

## 2023-09-11 PROCEDURE — 3008F BODY MASS INDEX DOCD: CPT | Mod: CPTII,S$GLB,, | Performed by: INTERNAL MEDICINE

## 2023-09-11 PROCEDURE — 4010F ACE/ARB THERAPY RXD/TAKEN: CPT | Mod: CPTII,S$GLB,, | Performed by: INTERNAL MEDICINE

## 2023-09-11 PROCEDURE — 3066F NEPHROPATHY DOC TX: CPT | Mod: CPTII,S$GLB,, | Performed by: INTERNAL MEDICINE

## 2023-09-11 PROCEDURE — 3074F SYST BP LT 130 MM HG: CPT | Mod: CPTII,S$GLB,, | Performed by: INTERNAL MEDICINE

## 2023-09-11 PROCEDURE — 3288F PR FALLS RISK ASSESSMENT DOCUMENTED: ICD-10-PCS | Mod: CPTII,S$GLB,, | Performed by: INTERNAL MEDICINE

## 2023-09-11 PROCEDURE — 4010F PR ACE/ARB THEARPY RXD/TAKEN: ICD-10-PCS | Mod: CPTII,S$GLB,, | Performed by: INTERNAL MEDICINE

## 2023-09-11 NOTE — PROGRESS NOTES
Subjective:   Patient ID:  Christina Tomlinson is a 67 y.o. female who presents for evaluation of cardiomyopathy    HPI:   September 2023:  She is doing well today.  No chest pain.  [facility] without any symptoms.  No lower extremity edema.  No significant dyspnea on exertion, orthopnea, or PND.       March 2023:  Here for follow up. She stated that she has been doing ok, She exercise 3 times a week at senior center with no issues. No chest pain, no palpitations  No LE edema, no orthopnea. Compliant with her medications  BP is toward the lower side this morning. She took the valsartan this morning  No syncope or pre syncope, no dizziness. She doesn't monitor her BP at home     Hx of cardiomyopathy with LVEF ~ 45  9/18/19  Ef improved to 45% so not needing AICD.     Pt was previously seen by Dr. Solorio/Dr. Mckeon .    CATH 6/2016  Angiographic Results     Diagnostic:          Patient has a left dominant coronary artery.        - Left Main Coronary Artery:             The ostial LM is normal. There is ARIELA 3 flow.     - Left Anterior Descending Artery:             The LAD is normal. There is ARIELA 3 flow.     - D1:             The D1 has luminal irregularities. There is ARIELA 3 flow.     - Ramus:             The proximal ramus has a 60% stenosis. There is ARIELA 3 flow.     - Left Circumflex Artery:             The LCX is normal. There is ARIELA 3 flow.     - Right Coronary Artery:             The proximal RCA has a 75% stenosis. There is ARIELA 3 flow. The remaining portion of the vessel is of small caliber.      TTE 4/2022  The left ventricle is normal in size with concentric remodeling and normal systolic function.  The estimated ejection fraction is 55%.  Normal left ventricular diastolic function.  Normal right ventricular size with normal right ventricular systolic function.  Mild-to-moderate mitral regurgitation.  Normal central venous pressure (3 mmHg).  TTE 2/11/2021  Mild concentric  hypertrophy and low normal systolic function. The estimated ejection fraction is 50%  Mild-to-moderate mitral regurgitation.  Normal left ventricular diastolic function.  Mild pulmonic regurgitation.  Mild tricuspid regurgitation.  Normal right ventricular size with normal right ventricular systolic function.  There is pulmonary hypertension.    TTE 2017      Mildly depressed left ventricular systolic function (EF 45-50%).     2 - Normal left ventricular diastolic function.     3 - Normal right ventricular systolic function .     4 - Moderate mitral regurgitation.     DEVAN 2016    Moderately depressed left ventricular systolic function (EF 35-40%).     2 - Mild to moderate mitral regurgitation.     3 - Left atrial appendage is single lobed.     4 - Left atrium is enlarged.     5 - Grade 1 atheroma disease of aorta.        Holter Monitor 2021  Sinus rhythm HR  bpm (av bpm)  Very rare PACs, PVCs  Diary not returned to correlate any symptoms    Patient Active Problem List    Diagnosis Date Noted    Epigastric pain 2023    Gastritis 2023    Morbid (severe) obesity due to excess calories 2022    Coronary artery disease involving native coronary artery of native heart with angina pectoris 2022    Obesity (BMI 30-39.9) 02/10/2021    Chronic pain of both shoulders 2020    Decreased ROM of right shoulder 2020    Decreased ROM of left shoulder 2020    Muscle weakness of left arm 2020    Muscle weakness of right arm 2020    Urolithiasis 10/05/2018    Moderate mitral insufficiency 01/10/2017    Cardiomyopathy 2016    Coronary artery disease involving native coronary artery of native heart without angina pectoris 2016    Cardiomyopathy due to hypertension, with heart failure 2016     L dominant system, LAD, D1, LCx patent, Ramus 60%, small RCA with 75% stenosis, EF 40% with LVEDP 28 mmHg  MR not well visualized, at least moderate       Decreased ROM of right knee 03/15/2016    Weakness of right lower extremity 03/15/2016    Difficulty walking 03/15/2016    Decreased functional mobility 03/15/2016    S/P total knee arthroplasty 03/14/2016    Knee pain 03/09/2016    Essential hypertension 03/02/2016    Anemia of chronic disease 03/02/2016    Aftercare following left knee joint replacement surgery 02/01/2016    Anemia 01/13/2016    Primary hyperparathyroidism 12/02/2015    Acute rheumatoid arthritis 10/08/2015    Primary osteoarthritis of right knee 09/16/2015    Back pain of thoracolumbar region 06/29/2015    GERD (gastroesophageal reflux disease) 06/29/2015    Arthritis of knee 04/07/2015    Arthritis of hand 04/07/2015    Medial meniscus tear 08/27/2014    Dysphagia, oropharyngeal 09/06/2013    Dysphagia, unspecified(787.20) 09/06/2013     Dx updated per 2019 IMO Load      Breast cancer screening 07/30/2013    Sore throat 01/07/2013    Torn rotator cuff 01/07/2013    Corns and callosities 11/30/2012    Dermatophytosis of nail 11/30/2012    Pain in limb 11/30/2012       Patient's Medications   New Prescriptions    No medications on file   Previous Medications    ACETAMINOPHEN (TYLENOL) 500 MG TABLET    Take 500 mg by mouth every 6 (six) hours as needed for Pain.    AMITRIPTYLINE (ELAVIL) 25 MG TABLET    Take 25 mg by mouth nightly as needed for Insomnia.    ASPIRIN (ECOTRIN) 81 MG EC TABLET    Take 1 tablet (81 mg total) by mouth once. for 1 dose    ATORVASTATIN (LIPITOR) 40 MG TABLET    Take 1 tablet by mouth once daily    BRIMONIDINE 0.15 % OPTH DROP (ALPHAGAN) 0.15 % OPHTHALMIC SOLUTION    1 drop 3 (three) times daily.    CHOLECALCIFEROL, VITAMIN D3, 3,000 UNIT TAB    Take by mouth.    FERROUS GLUCONATE (FERGON) 240 (27 FE) MG TABLET    Take 480 mg by mouth 3 (three) times daily.    FUROSEMIDE (LASIX) 40 MG TABLET    Take 1 tablet by mouth once daily    HYDROXYCHLOROQUINE (PLAQUENIL) 200 MG TABLET    Take 200 mg by mouth 2 (two) times daily.      LEFLUNOMIDE (ARAVA) 10 MG TAB    Take 20 mg by mouth once daily.    METOPROLOL SUCCINATE (TOPROL-XL) 50 MG 24 HR TABLET    Take 1 tablet (50 mg total) by mouth 2 (two) times daily.    PANTOPRAZOLE (PROTONIX) 40 MG TABLET    Take 1 tablet (40 mg total) by mouth once daily.    TERBINAFINE HCL (LAMISIL) 250 MG TABLET    Take 250 mg by mouth once daily.    VALSARTAN (DIOVAN) 160 MG TABLET    Take 1 tablet (160 mg total) by mouth once daily.   Modified Medications    No medications on file   Discontinued Medications    No medications on file         Review of Systems   Constitutional: Negative for chills and fever.   HENT:  Negative for hearing loss and nosebleeds.    Eyes:  Negative for blurred vision.   Cardiovascular:  Negative for chest pain, leg swelling and palpitations.   Respiratory:  Negative for hemoptysis and shortness of breath.    Hematologic/Lymphatic: Negative for bleeding problem.   Skin:  Negative for itching.   Musculoskeletal:  Negative for falls.   Gastrointestinal:  Negative for abdominal pain and hematochezia.   Genitourinary:  Negative for hematuria.   Neurological:  Negative for dizziness and loss of balance.   Psychiatric/Behavioral:  Negative for altered mental status and depression.          Objective:   Physical Exam  Constitutional:       Appearance: She is well-developed.   HENT:      Head: Normocephalic and atraumatic.   Eyes:      Conjunctiva/sclera: Conjunctivae normal.   Neck:      Vascular: No carotid bruit or JVD.   Cardiovascular:      Rate and Rhythm: Normal rate and regular rhythm.      Pulses:           Carotid pulses are 2+ on the right side and 2+ on the left side.       Radial pulses are 2+ on the right side and 2+ on the left side.      Heart sounds: Normal heart sounds. No murmur heard.     No friction rub. No gallop.   Pulmonary:      Effort: Pulmonary effort is normal. No respiratory distress.      Breath sounds: Normal breath sounds. No stridor. No wheezing.    Musculoskeletal:      Cervical back: Neck supple.   Skin:     General: Skin is warm and dry.   Neurological:      Mental Status: She is alert and oriented to person, place, and time.   Psychiatric:         Behavior: Behavior normal.         Lab Results    Lab Results   Component Value Date     05/25/2023    K 3.4 (L) 05/25/2023     05/25/2023    CO2 31 05/25/2023    BUN 16 05/25/2023    CREATININE 1.10 (H) 05/25/2023     (H) 05/25/2023    HGBA1C 5.2 02/15/2016    MG 1.6 05/25/2023    AST 16 05/25/2023    ALT 10 05/25/2023    ALBUMIN 3.5 (L) 05/25/2023    PROT 6.0 (L) 05/25/2023    BILITOT 0.3 05/25/2023    WBC 6.9 02/15/2023    HGB 10.6 (L) 02/15/2023    HCT 33.1 (L) 02/15/2023    MCV 89.9 02/15/2023     02/15/2023    INR 1.0 08/17/2016    TSH 1.60 08/01/2022    CHOL 145 08/01/2022    HDL 43 (L) 08/01/2022    LDLCALC 75 08/01/2022    TRIG 178 (H) 08/01/2022    CRP 27.6 (H) 09/14/2016       Lipid panel  Lab Results   Component Value Date    CHOL 145 08/01/2022     Lab Results   Component Value Date    HDL 43 (L) 08/01/2022     Lab Results   Component Value Date    LDLCALC 75 08/01/2022     Lab Results   Component Value Date    TRIG 178 (H) 08/01/2022       Cardiac Studies  Significant Imaging: Echocardiogram:   2D echo with color flow doppler:   Results for orders placed or performed during the hospital encounter of 01/16/17   2D Echo w/ Color Flow Doppler   Result Value Ref Range    EF + QEF 45 55 - 65    Mitral Valve Regurgitation MODERATE (A)     Diastolic Dysfunction No     Est. PA Systolic Pressure 18.68     Mitral Valve Mobility NORMAL     Tricuspid Valve Regurgitation TRIVIAL TO MILD     Narrative    Date of Procedure: 01/16/2017        TEST DESCRIPTION   Technical Quality: This is a technically adequate study.     Aorta: The aortic root is normal in size, measuring 2.1 cm at sinotubular junction.     Left Atrium: The left atrial volume index is normal, measuring 24.06 cc/m2.      Left Ventricle: The left ventricle is normal in size, with an end-diastolic diameter of 5.2 cm, and an end-systolic diameter of 4.1 cm. LV wall thickness is normal, with the septum measuring 0.6 cm and the posterior wall measuring 0.5 cm across. Relative   wall thickness was normal at 0.19, and the LV mass index was 53.6 g/m2 consistent with normal left ventricular mass. Global left ventricular systolic function appears mildly depressed. Visually estimated ejection fraction is 45-50%. The LV Doppler   derived stroke volume equals 33.0 ccs.   The E/e'(lat) is 10, consistent with normal diastolic function.     Right Atrium: The right atrium is normal in size, measuring 3.3 cm in length in the apical view.     Right Ventricle: The right ventricle is normal in size measuring 2.3 cm at the base in the apical right ventricle-focused view. Global right ventricular systolic function appears normal. Tricuspid annular plane systolic excursion (TAPSE) is 2.0 cm. The   estimated PA systolic pressure is 19 mmHg.     Aortic Valve:  The aortic valve is normal in structure with normal leaflet mobility.     Mitral Valve:  The mitral valve is normal in structure with normal leaflet mobility. There is moderate mitral regurgitation.     Tricuspid Valve:  The tricuspid valve is normal in structure with normal leaflet mobility. There is trivial to mild tricuspid regurgitation.     Pulmonary Valve:  The pulmonic valve is not well seen.     IVC: IVC is normal in size and collapses > 50% with a sniff, suggesting normal right atrial pressure of 3 mmHg.     Atrial Septum: The atrial septum is intact.     Intracavitary: There is no evidence of pericardial effusion, intracavity mass, thrombi, or vegetation.         CONCLUSIONS     1 - Mildly depressed left ventricular systolic function (EF 45-50%).     2 - Normal left ventricular diastolic function.     3 - Normal right ventricular systolic function .     4 - Moderate mitral regurgitation.  "            This document has been electronically    SIGNED BY: Chan Solorio MD On: 01/16/2017 11:55    and Transthoracic echo (TTE) complete (Cupid Only):   Results for orders placed or performed during the hospital encounter of 04/07/22   Echo   Result Value Ref Range    AV mean gradient 3 mmHg    Ao peak bobby 1.08 m/s    Ao VTI 20.37 cm    IVS 1.00 (A) 0.6 - 1.1 cm    LA size 3.01 cm    Left Atrium Major Axis 4.36 cm    Left Atrium Minor Axis 4.60 cm    LVIDd 4.20 3.5 - 6.0 cm    LVIDs 2.00 2.1 - 4.0 cm    LVOT diameter 1.99 cm    LVOT peak VTI 16.45 cm    Posterior Wall 1.00 (A) 0.6 - 1.1 cm    PV Peak D Bobby 0.40 m/s    PV Peak S Bobby 0.50 m/s    RA Major Axis 4.00 cm    RA Width 3.40 cm    RVDD 2.10 cm    Sinus 2.37 cm    TAPSE 1.46 cm    Ao root annulus 2.07 cm    AORTIC VALVE CUSP SEPERATION 1.61 cm    PV PEAK VELOCITY 0.84 cm/s    LV Diastolic Volume 64.74 mL    LV Systolic Volume 31.34 mL    LVOT peak bobby 0.90 m/s    LA WIDTH 3.50 cm    Mr max bobby 0.06 m/s    LA volume (mod) 35.00 cm3    MV "A" wave duration 9.46 msec    MV mean gradient 1 mmHg    MV peak gradient 6 mmHg    RV S' 7.27 cm/s    MV VTI 19.17 cm    TDI LATERAL 0.08 m/s    TDI SEPTAL 0.06 m/s    FS 52 %    LA volume 40.09 cm3    LV mass 137.25 g    Left Ventricle Relative Wall Thickness 0.48 cm    AV valve area 2.51 cm2    AV Velocity Ratio 0.83     AV index (prosthetic) 0.81     MV valve area by continuity eq 2.67 cm2    Mean e' 0.07 m/s    Pulm vein S/D ratio 1.25     LVOT area 3.1 cm2    LVOT stroke volume 51.14 cm3    AV peak gradient 5 mmHg    BSA 2.06 m2    LV Systolic Volume Index 15.8 mL/m2    LV Diastolic Volume Index 32.70 mL/m2    LA Volume Index 20.2 mL/m2    LV Mass Index 69 g/m2    LA Volume Index (Mod) 17.7 mL/m2    Right Atrial Pressure (from IVC) 3 mmHg    EF 55 %    Right ventricular length in diastole (apical 4-chamber view) 4.50 cm    Narrative    · The left ventricle is normal in size with concentric remodeling and   normal " systolic function.  · The estimated ejection fraction is 55%.  · Normal left ventricular diastolic function.  · Normal right ventricular size with normal right ventricular systolic   function.  · Mild-to-moderate mitral regurgitation.  · Normal central venous pressure (3 mmHg).        ECG:  normal EKG, normal sinus rhythm, unchanged from previous tracings.    Cath study :  2016    Angiographic Results     Diagnostic:          Patient has a left dominant coronary artery.        - Left Main Coronary Artery:             The ostial LM is normal. There is ARIELA 3 flow.     - Left Anterior Descending Artery:             The LAD is normal. There is ARIELA 3 flow.     - D1:             The D1 has luminal irregularities. There is ARIELA 3 flow.     - Ramus:             The proximal ramus has a 60% stenosis. There is ARIELA 3 flow.     - Left Circumflex Artery:             The LCX is normal. There is ARIELA 3 flow.     - Right Coronary Artery:             The proximal RCA has a 75% stenosis. There is ARIELA 3 flow. The remaining portion of the vessel is of small caliber.       Assessment:     1. Cardiomyopathy due to hypertension, with heart failure    2. Essential hypertension    3. Cardiomyopathy, unspecified type    4. Coronary artery disease involving native coronary artery of native heart without angina pectoris    5. Moderate mitral insufficiency    6. Coronary artery disease involving native coronary artery of native heart with angina pectoris    7. Obesity (BMI 30-39.9)        Plan:   - EF recovered  - ContinueToprol to 50  BID  - Continue GDMT  - Valsartan 160 mg daily.   - Diovan, lasix (prn)   - Stable CAD / will continue ASA/statin   - LDL close to goal. Repeat lipid panel  - Low salt diet  - Will monitor for mild-moderate MR. Clinically doing very well. NYHA FC II        I spent 5-10 minutes asking, assessing, assisting, arranging and advising heart healthy diet improvements. This included low-salt meals, portion control  and health food alternatives. I also encourage 30 minutes of moderate exercise 3-4x a week.       Continue with current medical plan and lifestyle changes.  Return sooner for concerns or questions. If symptoms persist go to the ED  Total duration of face to face visit time 30 minutes.  Total time spent counseling greater than fifty percent of total visit time.  Counseling included discussion regarding imaging findings, diagnosis, possibilities, treatment options, risks and benefits.      No orders of the defined types were placed in this encounter.        Follow up as scheduled. Return to clinic in 6 months  She expressed verbal understanding and agreed with the plan    Thank you for the opportunity to care for this patient. Will be available for questions if needed.

## 2023-09-14 ENCOUNTER — LAB VISIT (OUTPATIENT)
Dept: LAB | Facility: HOSPITAL | Age: 68
End: 2023-09-14
Attending: INTERNAL MEDICINE
Payer: MEDICARE

## 2023-09-14 DIAGNOSIS — I25.10 CORONARY ARTERY DISEASE INVOLVING NATIVE CORONARY ARTERY OF NATIVE HEART WITHOUT ANGINA PECTORIS: ICD-10-CM

## 2023-09-14 LAB
CHOLEST SERPL-MCNC: 174 MG/DL (ref 120–199)
CHOLEST/HDLC SERPL: 5 {RATIO} (ref 2–5)
HDLC SERPL-MCNC: 35 MG/DL (ref 40–75)
HDLC SERPL: 20.1 % (ref 20–50)
LDLC SERPL CALC-MCNC: 98.8 MG/DL (ref 63–159)
NONHDLC SERPL-MCNC: 139 MG/DL
TRIGL SERPL-MCNC: 201 MG/DL (ref 30–150)

## 2023-09-14 PROCEDURE — 80061 LIPID PANEL: CPT | Performed by: INTERNAL MEDICINE

## 2023-09-14 PROCEDURE — 36415 COLL VENOUS BLD VENIPUNCTURE: CPT | Mod: PO | Performed by: INTERNAL MEDICINE

## 2023-09-14 NOTE — PROGRESS NOTES
Please let Ms. Tomlinson knows that his cholesterol number is above our goal. I would like to increase the Atorvastatin to 80 mg daily. Thanks

## 2023-09-27 LAB
APPEARANCE UR: ABNORMAL
BACTERIA #/AREA URNS HPF: ABNORMAL /HPF
BACTERIA UR CULT: NORMAL
BILIRUB UR QL STRIP: NEGATIVE
COLOR UR: YELLOW
GLUCOSE UR QL STRIP: NEGATIVE
HGB UR QL STRIP: NEGATIVE
HYALINE CASTS #/AREA URNS LPF: ABNORMAL /LPF
KETONES UR QL STRIP: ABNORMAL
LEUKOCYTE ESTERASE UR QL STRIP: ABNORMAL
MAGNESIUM SERPL-MCNC: 1.7 MG/DL (ref 1.5–2.5)
NITRITE UR QL STRIP: NEGATIVE
PH UR STRIP: 5.5 [PH] (ref 5–8)
PHOSPHATE SERPL-MCNC: 3.4 MG/DL (ref 2.1–4.3)
PROT UR QL STRIP: ABNORMAL
RBC #/AREA URNS HPF: ABNORMAL /HPF
SERVICE CMNT-IMP: ABNORMAL
SP GR UR STRIP: 1.03 (ref 1–1.03)
SQUAMOUS #/AREA URNS HPF: ABNORMAL /HPF
WBC #/AREA URNS HPF: ABNORMAL /HPF

## 2023-10-02 ENCOUNTER — PATIENT MESSAGE (OUTPATIENT)
Dept: CARDIOLOGY | Facility: CLINIC | Age: 68
End: 2023-10-02
Payer: MEDICARE

## 2023-11-01 RX ORDER — FUROSEMIDE 40 MG/1
40 TABLET ORAL DAILY
Qty: 90 TABLET | Refills: 0 | Status: SHIPPED | OUTPATIENT
Start: 2023-11-01 | End: 2024-01-24

## 2023-11-02 RX ORDER — ATORVASTATIN CALCIUM 40 MG/1
40 TABLET, FILM COATED ORAL DAILY
Qty: 90 TABLET | Refills: 3 | Status: SHIPPED | OUTPATIENT
Start: 2023-11-02

## 2023-11-25 DIAGNOSIS — I43 CARDIOMYOPATHY DUE TO HYPERTENSION, WITH HEART FAILURE: ICD-10-CM

## 2023-11-25 DIAGNOSIS — R00.0 TACHYCARDIA: ICD-10-CM

## 2023-11-25 DIAGNOSIS — I11.0 CARDIOMYOPATHY DUE TO HYPERTENSION, WITH HEART FAILURE: ICD-10-CM

## 2023-11-27 RX ORDER — METOPROLOL SUCCINATE 50 MG/1
50 TABLET, EXTENDED RELEASE ORAL 2 TIMES DAILY
Qty: 180 TABLET | Refills: 0 | Status: SHIPPED | OUTPATIENT
Start: 2023-11-27 | End: 2024-02-19

## 2024-01-24 RX ORDER — FUROSEMIDE 40 MG/1
40 TABLET ORAL
Qty: 90 TABLET | Refills: 0 | Status: SHIPPED | OUTPATIENT
Start: 2024-01-24 | End: 2024-05-20

## 2024-02-18 DIAGNOSIS — I43 CARDIOMYOPATHY DUE TO HYPERTENSION, WITH HEART FAILURE: ICD-10-CM

## 2024-02-18 DIAGNOSIS — I11.0 CARDIOMYOPATHY DUE TO HYPERTENSION, WITH HEART FAILURE: ICD-10-CM

## 2024-02-18 DIAGNOSIS — R00.0 TACHYCARDIA: ICD-10-CM

## 2024-02-19 RX ORDER — METOPROLOL SUCCINATE 50 MG/1
50 TABLET, EXTENDED RELEASE ORAL 2 TIMES DAILY
Qty: 180 TABLET | Refills: 0 | Status: SHIPPED | OUTPATIENT
Start: 2024-02-19 | End: 2024-05-27

## 2024-03-14 DIAGNOSIS — I10 ESSENTIAL HYPERTENSION: ICD-10-CM

## 2024-03-14 RX ORDER — VALSARTAN 160 MG/1
160 TABLET ORAL
Qty: 90 TABLET | Refills: 3 | Status: SHIPPED | OUTPATIENT
Start: 2024-03-14

## 2024-03-25 ENCOUNTER — OFFICE VISIT (OUTPATIENT)
Dept: GASTROENTEROLOGY | Facility: CLINIC | Age: 69
End: 2024-03-25
Payer: MEDICARE

## 2024-03-25 ENCOUNTER — PATIENT MESSAGE (OUTPATIENT)
Dept: GASTROENTEROLOGY | Facility: CLINIC | Age: 69
End: 2024-03-25

## 2024-03-25 ENCOUNTER — TELEPHONE (OUTPATIENT)
Dept: GASTROENTEROLOGY | Facility: CLINIC | Age: 69
End: 2024-03-25
Payer: MEDICARE

## 2024-03-25 VITALS
HEART RATE: 91 BPM | WEIGHT: 217.38 LBS | SYSTOLIC BLOOD PRESSURE: 158 MMHG | HEIGHT: 64 IN | DIASTOLIC BLOOD PRESSURE: 85 MMHG | BODY MASS INDEX: 37.11 KG/M2

## 2024-03-25 DIAGNOSIS — R10.13 DYSPEPSIA: Primary | ICD-10-CM

## 2024-03-25 PROCEDURE — 1101F PT FALLS ASSESS-DOCD LE1/YR: CPT | Mod: CPTII,S$GLB,, | Performed by: INTERNAL MEDICINE

## 2024-03-25 PROCEDURE — 1159F MED LIST DOCD IN RCRD: CPT | Mod: CPTII,S$GLB,, | Performed by: INTERNAL MEDICINE

## 2024-03-25 PROCEDURE — 99213 OFFICE O/P EST LOW 20 MIN: CPT | Mod: S$GLB,,, | Performed by: INTERNAL MEDICINE

## 2024-03-25 PROCEDURE — 3077F SYST BP >= 140 MM HG: CPT | Mod: CPTII,S$GLB,, | Performed by: INTERNAL MEDICINE

## 2024-03-25 PROCEDURE — 1126F AMNT PAIN NOTED NONE PRSNT: CPT | Mod: CPTII,S$GLB,, | Performed by: INTERNAL MEDICINE

## 2024-03-25 PROCEDURE — 99214 OFFICE O/P EST MOD 30 MIN: CPT | Mod: PBBFAC,PO | Performed by: INTERNAL MEDICINE

## 2024-03-25 PROCEDURE — 3008F BODY MASS INDEX DOCD: CPT | Mod: CPTII,S$GLB,, | Performed by: INTERNAL MEDICINE

## 2024-03-25 PROCEDURE — 3288F FALL RISK ASSESSMENT DOCD: CPT | Mod: CPTII,S$GLB,, | Performed by: INTERNAL MEDICINE

## 2024-03-25 PROCEDURE — 4010F ACE/ARB THERAPY RXD/TAKEN: CPT | Mod: CPTII,S$GLB,, | Performed by: INTERNAL MEDICINE

## 2024-03-25 PROCEDURE — 3079F DIAST BP 80-89 MM HG: CPT | Mod: CPTII,S$GLB,, | Performed by: INTERNAL MEDICINE

## 2024-03-25 PROCEDURE — 99999 PR PBB SHADOW E&M-EST. PATIENT-LVL IV: CPT | Mod: PBBFAC,,, | Performed by: INTERNAL MEDICINE

## 2024-03-25 NOTE — TELEPHONE ENCOUNTER
----- Message from Heidy Daniel sent at 3/25/2024 10:35 AM CDT -----  Type:  Sooner Appointment Request    Caller is requesting a sooner appointment.  Caller declined first available appointment listed below.  Caller will not accept being placed on the waitlist and is requesting a message be sent to doctor.  Name of Caller:pt daughter   When is the first available appointment?  Symptoms:Acid reflux   Would the patient rather a call back or a response via [employer]? Call   Best Call Back Number:   Additional Information:

## 2024-03-25 NOTE — PATIENT INSTRUCTIONS
Continue pantoprazole 40 mg daily. Plan to trial bentyl if sxs worsen. Repeat colonoscopy in 2/2026 for screening purposes   ok to continue ASA

## 2024-03-25 NOTE — TELEPHONE ENCOUNTER
Jyothi, daughter, requesting appt today, for acid reflux follow up.  Appt scheduled on today, March 25, 2024 at 115pm.

## 2024-03-25 NOTE — PROGRESS NOTES
"[facility]    CC: Epigastric pain    HPI 68 y.o. female with GERD and RA who presents with complaints of epigastric abdominal pain. Overall she is doing well. Reports having flare up of GERD last night. Sxs improved with tums. She exercised this morning without sxs and feels fine today. She denies any recent melena, hematochezia, nausea, emesis, constipation, diarrhea.     Past Medical History  HTN  CAD  Cardiomyopathy   OA  GERD    Physical Examination  BP (!) 158/85 (BP Location: Left arm, Patient Position: Sitting, BP Method: Large (Automatic))   Pulse 91   Ht 5' 4" (1.626 m)   Wt 98.6 kg (217 lb 6 oz)   BMI 37.31 kg/m²   General appearance: alert, cooperative, no distress  Abdomen: soft, non-tender; bowel sounds normoactive; no organomegaly  Lab Results   Component Value Date    WBC 6.9 02/15/2023    HGB 10.6 (L) 02/15/2023    HCT 33.1 (L) 02/15/2023    MCV 89.9 02/15/2023     02/15/2023     Prior Endoscopy  EGD and colonoscopy in 2016: EGD with small hiatal hernia. Gastric biopsies negative for HP. Duodenal biopsies with chronic, inactive duodenitis. Colonoscopy normal (10 year follow-up recommended).    EGD (5/2023): small hiatal hernia, gastritis negative for H pylori    Assessment:   68 yoF with small hiatal hernia, GERD previously on famotidine, sxs now controlled on PPI presenting for dyspepsia follow up.     Plan:  - continue pantoprazole 40 mg daily. Plan to trial bentyl if sxs worsen.  - Repeat colonoscopy in 2/2026 for screening purposes   - ok to continue ASA   - Patient known to me but reinterview next visit     Jono Houser MD   [facility]     Curt Claire MD   [facility]   [address], [address]   [place] [address] [phone]      "

## 2024-05-20 ENCOUNTER — OFFICE VISIT (OUTPATIENT)
Dept: CARDIOLOGY | Facility: CLINIC | Age: 69
End: 2024-05-20
Payer: MEDICARE

## 2024-05-20 VITALS
HEIGHT: 64 IN | SYSTOLIC BLOOD PRESSURE: 138 MMHG | WEIGHT: 214.5 LBS | OXYGEN SATURATION: 98 % | BODY MASS INDEX: 36.62 KG/M2 | HEART RATE: 81 BPM | DIASTOLIC BLOOD PRESSURE: 84 MMHG

## 2024-05-20 DIAGNOSIS — I25.119 CORONARY ARTERY DISEASE INVOLVING NATIVE CORONARY ARTERY OF NATIVE HEART WITH ANGINA PECTORIS: ICD-10-CM

## 2024-05-20 DIAGNOSIS — I42.9 CARDIOMYOPATHY, UNSPECIFIED TYPE: Primary | ICD-10-CM

## 2024-05-20 DIAGNOSIS — I10 ESSENTIAL HYPERTENSION: ICD-10-CM

## 2024-05-20 DIAGNOSIS — I42.9 CARDIOMYOPATHY, UNSPECIFIED TYPE: ICD-10-CM

## 2024-05-20 DIAGNOSIS — I34.0 MODERATE MITRAL INSUFFICIENCY: ICD-10-CM

## 2024-05-20 DIAGNOSIS — I10 ESSENTIAL HYPERTENSION: Primary | ICD-10-CM

## 2024-05-20 DIAGNOSIS — E66.01 MORBID (SEVERE) OBESITY DUE TO EXCESS CALORIES: ICD-10-CM

## 2024-05-20 PROCEDURE — 3288F FALL RISK ASSESSMENT DOCD: CPT | Mod: CPTII,S$GLB,, | Performed by: INTERNAL MEDICINE

## 2024-05-20 PROCEDURE — 1126F AMNT PAIN NOTED NONE PRSNT: CPT | Mod: CPTII,S$GLB,, | Performed by: INTERNAL MEDICINE

## 2024-05-20 PROCEDURE — 4010F ACE/ARB THERAPY RXD/TAKEN: CPT | Mod: CPTII,S$GLB,, | Performed by: INTERNAL MEDICINE

## 2024-05-20 PROCEDURE — 99999 PR PBB SHADOW E&M-EST. PATIENT-LVL III: CPT | Mod: PBBFAC,,, | Performed by: INTERNAL MEDICINE

## 2024-05-20 PROCEDURE — 3079F DIAST BP 80-89 MM HG: CPT | Mod: CPTII,S$GLB,, | Performed by: INTERNAL MEDICINE

## 2024-05-20 PROCEDURE — 3075F SYST BP GE 130 - 139MM HG: CPT | Mod: CPTII,S$GLB,, | Performed by: INTERNAL MEDICINE

## 2024-05-20 PROCEDURE — 3008F BODY MASS INDEX DOCD: CPT | Mod: CPTII,S$GLB,, | Performed by: INTERNAL MEDICINE

## 2024-05-20 PROCEDURE — 99214 OFFICE O/P EST MOD 30 MIN: CPT | Mod: S$GLB,,, | Performed by: INTERNAL MEDICINE

## 2024-05-20 PROCEDURE — 1159F MED LIST DOCD IN RCRD: CPT | Mod: CPTII,S$GLB,, | Performed by: INTERNAL MEDICINE

## 2024-05-20 PROCEDURE — 1101F PT FALLS ASSESS-DOCD LE1/YR: CPT | Mod: CPTII,S$GLB,, | Performed by: INTERNAL MEDICINE

## 2024-05-20 RX ORDER — FUROSEMIDE 40 MG/1
40 TABLET ORAL
Qty: 90 TABLET | Refills: 3 | Status: SHIPPED | OUTPATIENT
Start: 2024-05-20

## 2024-05-20 RX ORDER — ATORVASTATIN CALCIUM 80 MG/1
80 TABLET, FILM COATED ORAL NIGHTLY
Qty: 90 TABLET | Refills: 3 | Status: SHIPPED | OUTPATIENT
Start: 2024-05-20 | End: 2025-05-20

## 2024-05-20 RX ORDER — BETAMETHASONE VALERATE 1.2 MG/G
OINTMENT TOPICAL 2 TIMES DAILY
COMMUNITY
Start: 2024-05-14

## 2024-05-20 NOTE — PROGRESS NOTES
Subjective:   Patient ID:  Christina Tomlinson is a 68 y.o. female who presents for evaluation of cardiomyopathy    HPI:   5/20/2024:  Follow up.  She has been doing well since last visit.  [facility] any issues.  BP well-controlled.  No major cardiac complaints today.    September 2023:  She is doing well today.  No chest pain.  She exercise regularly at [facility] center without any symptoms.  No lower extremity edema.  No significant dyspnea on exertion, orthopnea, or PND.       March 2023:  Here for follow up. She stated that she has been doing ok, She exercise 3 times a week at [facility] with no issues. No chest pain, no palpitations  No LE edema, no orthopnea. Compliant with her medications  BP is toward the lower side this morning. She took the valsartan this morning  No syncope or pre syncope, no dizziness. She doesn't monitor her BP at home     Hx of cardiomyopathy with LVEF ~ 45  9/18/19  Ef improved to 45% so not needing AICD.     Pt was previously seen by Dr. Solorio/Dr. Mckeon .    CATH 6/2016  Angiographic Results     Diagnostic:          Patient has a left dominant coronary artery.        - Left Main Coronary Artery:             The ostial LM is normal. There is ARIELA 3 flow.     - Left Anterior Descending Artery:             The LAD is normal. There is ARIELA 3 flow.     - D1:             The D1 has luminal irregularities. There is ARIELA 3 flow.     - Ramus:             The proximal ramus has a 60% stenosis. There is ARIELA 3 flow.     - Left Circumflex Artery:             The LCX is normal. There is ARIELA 3 flow.     - Right Coronary Artery:             The proximal RCA has a 75% stenosis. There is ARIELA 3 flow. The remaining portion of the vessel is of small caliber.      TTE 4/2022  The left ventricle is normal in size with concentric remodeling and normal systolic function.  The estimated ejection fraction is 55%.  Normal left ventricular diastolic function.  Normal  right ventricular size with normal right ventricular systolic function.  Mild-to-moderate mitral regurgitation.  Normal central venous pressure (3 mmHg).  TTE 2021  Mild concentric hypertrophy and low normal systolic function. The estimated ejection fraction is 50%  Mild-to-moderate mitral regurgitation.  Normal left ventricular diastolic function.  Mild pulmonic regurgitation.  Mild tricuspid regurgitation.  Normal right ventricular size with normal right ventricular systolic function.  There is pulmonary hypertension.    TTE 2017      Mildly depressed left ventricular systolic function (EF 45-50%).     2 - Normal left ventricular diastolic function.     3 - Normal right ventricular systolic function .     4 - Moderate mitral regurgitation.     DEVAN 2016    Moderately depressed left ventricular systolic function (EF 35-40%).     2 - Mild to moderate mitral regurgitation.     3 - Left atrial appendage is single lobed.     4 - Left atrium is enlarged.     5 - Grade 1 atheroma disease of aorta.        Holter Monitor 2021  Sinus rhythm HR  bpm (av bpm)  Very rare PACs, PVCs  Diary not returned to correlate any symptoms    Patient Active Problem List    Diagnosis Date Noted    Epigastric pain 2023    Gastritis 2023    Morbid (severe) obesity due to excess calories 2022    Coronary artery disease involving native coronary artery of native heart with angina pectoris 2022    Obesity (BMI 30-39.9) 02/10/2021    Chronic pain of both shoulders 2020    Decreased ROM of right shoulder 2020    Decreased ROM of left shoulder 2020    Muscle weakness of left arm 2020    Muscle weakness of right arm 2020    Urolithiasis 10/05/2018    Moderate mitral insufficiency 01/10/2017    Cardiomyopathy 2016    Coronary artery disease involving native coronary artery of native heart without angina pectoris 2016    Cardiomyopathy due to hypertension, with  heart failure 06/22/2016     L dominant system, LAD, D1, LCx patent, Ramus 60%, small RCA with 75% stenosis, EF 40% with LVEDP 28 mmHg  MR not well visualized, at least moderate      Decreased ROM of right knee 03/15/2016    Weakness of right lower extremity 03/15/2016    Difficulty walking 03/15/2016    Decreased functional mobility 03/15/2016    S/P total knee arthroplasty 03/14/2016    Knee pain 03/09/2016    Essential hypertension 03/02/2016    Anemia of chronic disease 03/02/2016    Aftercare following left knee joint replacement surgery 02/01/2016    Anemia 01/13/2016    Primary hyperparathyroidism 12/02/2015    Acute rheumatoid arthritis 10/08/2015    Primary osteoarthritis of right knee 09/16/2015    Back pain of thoracolumbar region 06/29/2015    GERD (gastroesophageal reflux disease) 06/29/2015    Arthritis of knee 04/07/2015    Arthritis of hand 04/07/2015    Medial meniscus tear 08/27/2014    Dysphagia, oropharyngeal 09/06/2013    Dysphagia, unspecified(787.20) 09/06/2013     Dx updated per 2019 IMO Load      Breast cancer screening 07/30/2013    Sore throat 01/07/2013    Torn rotator cuff 01/07/2013    Corns and callosities 11/30/2012    Dermatophytosis of nail 11/30/2012    Pain in limb 11/30/2012       Patient's Medications   New Prescriptions    No medications on file   Previous Medications    ACETAMINOPHEN (TYLENOL) 500 MG TABLET    Take 500 mg by mouth every 6 (six) hours as needed for Pain.    AMITRIPTYLINE (ELAVIL) 25 MG TABLET    Take 25 mg by mouth nightly as needed for Insomnia.    ASPIRIN (ECOTRIN) 81 MG EC TABLET    Take 1 tablet (81 mg total) by mouth once. for 1 dose    ATORVASTATIN (LIPITOR) 40 MG TABLET    Take 1 tablet (40 mg total) by mouth once daily.    BRIMONIDINE 0.15 % OPTH DROP (ALPHAGAN) 0.15 % OPHTHALMIC SOLUTION    1 drop 3 (three) times daily.    CHOLECALCIFEROL, VITAMIN D3, 3,000 UNIT TAB    Take by mouth.    FERROUS GLUCONATE (FERGON) 240 (27 FE) MG TABLET    Take 480 mg by  mouth 3 (three) times daily.    FUROSEMIDE (LASIX) 40 MG TABLET    Take 1 tablet by mouth once daily    HYDROXYCHLOROQUINE (PLAQUENIL) 200 MG TABLET    Take 200 mg by mouth 2 (two) times daily.     LEFLUNOMIDE (ARAVA) 10 MG TAB    Take 20 mg by mouth once daily.    METOPROLOL SUCCINATE (TOPROL-XL) 50 MG 24 HR TABLET    Take 1 tablet by mouth twice daily    PANTOPRAZOLE (PROTONIX) 40 MG TABLET    Take 1 tablet (40 mg total) by mouth once daily.    TERBINAFINE HCL (LAMISIL) 250 MG TABLET    Take 250 mg by mouth once daily.    VALSARTAN (DIOVAN) 160 MG TABLET    Take 1 tablet by mouth once daily   Modified Medications    No medications on file   Discontinued Medications    No medications on file         Review of Systems   Constitutional: Negative for chills and fever.   HENT:  Negative for hearing loss and nosebleeds.    Eyes:  Negative for blurred vision.   Cardiovascular:  Negative for chest pain, leg swelling and palpitations.   Respiratory:  Negative for hemoptysis and shortness of breath.    Hematologic/Lymphatic: Negative for bleeding problem.   Skin:  Negative for itching.   Musculoskeletal:  Negative for falls.   Gastrointestinal:  Negative for abdominal pain and hematochezia.   Genitourinary:  Negative for hematuria.   Neurological:  Negative for dizziness and loss of balance.   Psychiatric/Behavioral:  Negative for altered mental status and depression.          Objective:   Physical Exam  Constitutional:       Appearance: She is well-developed.   HENT:      Head: Normocephalic and atraumatic.   Eyes:      Conjunctiva/sclera: Conjunctivae normal.   Neck:      Vascular: No carotid bruit or JVD.   Cardiovascular:      Rate and Rhythm: Normal rate and regular rhythm.      Pulses:           Carotid pulses are 2+ on the right side and 2+ on the left side.       Radial pulses are 2+ on the right side and 2+ on the left side.      Heart sounds: Normal heart sounds. No murmur heard.     No friction rub. No gallop.    Pulmonary:      Effort: Pulmonary effort is normal. No respiratory distress.      Breath sounds: Normal breath sounds. No stridor. No wheezing.   Musculoskeletal:      Cervical back: Neck supple.   Skin:     General: Skin is warm and dry.   Neurological:      Mental Status: She is alert and oriented to person, place, and time.   Psychiatric:         Behavior: Behavior normal.         Lab Results    Lab Results   Component Value Date     05/25/2023    K 3.4 (L) 05/25/2023     05/25/2023    CO2 31 05/25/2023    BUN 16 05/25/2023    CREATININE 1.10 (H) 05/25/2023     (H) 05/25/2023    HGBA1C 5.2 02/15/2016    MG 1.7 09/25/2023    AST 16 05/25/2023    ALT 10 05/25/2023    ALBUMIN 3.5 (L) 05/25/2023    PROT 6.0 (L) 05/25/2023    BILITOT 0.3 05/25/2023    WBC 6.9 02/15/2023    HGB 10.6 (L) 02/15/2023    HCT 33.1 (L) 02/15/2023    MCV 89.9 02/15/2023     02/15/2023    INR 1.0 08/17/2016    TSH 1.60 08/01/2022    CHOL 174 09/14/2023    HDL 35 (L) 09/14/2023    LDLCALC 98.8 09/14/2023    TRIG 201 (H) 09/14/2023    CRP 27.6 (H) 09/14/2016       Lipid panel  Lab Results   Component Value Date    CHOL 174 09/14/2023     Lab Results   Component Value Date    HDL 35 (L) 09/14/2023     Lab Results   Component Value Date    LDLCALC 98.8 09/14/2023     Lab Results   Component Value Date    TRIG 201 (H) 09/14/2023       Cardiac Studies  Significant Imaging: Echocardiogram:   2D echo with color flow doppler:   Results for orders placed or performed during the hospital encounter of 01/16/17   2D Echo w/ Color Flow Doppler   Result Value Ref Range    EF + QEF 45 55 - 65    Mitral Valve Regurgitation MODERATE (A)     Diastolic Dysfunction No     Est. PA Systolic Pressure 18.68     Mitral Valve Mobility NORMAL     Tricuspid Valve Regurgitation TRIVIAL TO MILD     Narrative    Date of Procedure: 01/16/2017        TEST DESCRIPTION   Technical Quality: This is a technically adequate study.     Aorta: The aortic  root is normal in size, measuring 2.1 cm at sinotubular junction.     Left Atrium: The left atrial volume index is normal, measuring 24.06 cc/m2.     Left Ventricle: The left ventricle is normal in size, with an end-diastolic diameter of 5.2 cm, and an end-systolic diameter of 4.1 cm. LV wall thickness is normal, with the septum measuring 0.6 cm and the posterior wall measuring 0.5 cm across. Relative   wall thickness was normal at 0.19, and the LV mass index was 53.6 g/m2 consistent with normal left ventricular mass. Global left ventricular systolic function appears mildly depressed. Visually estimated ejection fraction is 45-50%. The LV Doppler   derived stroke volume equals 33.0 ccs.   The E/e'(lat) is 10, consistent with normal diastolic function.     Right Atrium: The right atrium is normal in size, measuring 3.3 cm in length in the apical view.     Right Ventricle: The right ventricle is normal in size measuring 2.3 cm at the base in the apical right ventricle-focused view. Global right ventricular systolic function appears normal. Tricuspid annular plane systolic excursion (TAPSE) is 2.0 cm. The   estimated PA systolic pressure is 19 mmHg.     Aortic Valve:  The aortic valve is normal in structure with normal leaflet mobility.     Mitral Valve:  The mitral valve is normal in structure with normal leaflet mobility. There is moderate mitral regurgitation.     Tricuspid Valve:  The tricuspid valve is normal in structure with normal leaflet mobility. There is trivial to mild tricuspid regurgitation.     Pulmonary Valve:  The pulmonic valve is not well seen.     IVC: IVC is normal in size and collapses > 50% with a sniff, suggesting normal right atrial pressure of 3 mmHg.     Atrial Septum: The atrial septum is intact.     Intracavitary: There is no evidence of pericardial effusion, intracavity mass, thrombi, or vegetation.         CONCLUSIONS     1 - Mildly depressed left ventricular systolic function (EF  "45-50%).     2 - Normal left ventricular diastolic function.     3 - Normal right ventricular systolic function .     4 - Moderate mitral regurgitation.             This document has been electronically    SIGNED BY: Chan Solorio MD On: 01/16/2017 11:55    and Transthoracic echo (TTE) complete (Cupid Only):   Results for orders placed or performed during the hospital encounter of 04/07/22   Echo   Result Value Ref Range    AV mean gradient 3 mmHg    Ao peak bobby 1.08 m/s    Ao VTI 20.37 cm    IVS 1.00 (A) 0.6 - 1.1 cm    LA size 3.01 cm    Left Atrium Major Axis 4.36 cm    Left Atrium Minor Axis 4.60 cm    LVIDd 4.20 3.5 - 6.0 cm    LVIDs 2.00 2.1 - 4.0 cm    LVOT diameter 1.99 cm    LVOT peak VTI 16.45 cm    Posterior Wall 1.00 (A) 0.6 - 1.1 cm    PV Peak D Bobby 0.40 m/s    PV Peak S Bobby 0.50 m/s    RA Major Axis 4.00 cm    RA Width 3.40 cm    RVDD 2.10 cm    Sinus 2.37 cm    TAPSE 1.46 cm    Ao root annulus 2.07 cm    AORTIC VALVE CUSP SEPERATION 1.61 cm    PV PEAK VELOCITY 0.84 cm/s    LV Diastolic Volume 64.74 mL    LV Systolic Volume 31.34 mL    LVOT peak bobby 0.90 m/s    LA WIDTH 3.50 cm    Mr max bobby 0.06 m/s    LA volume (mod) 35.00 cm3    MV "A" wave duration 9.46 msec    MV mean gradient 1 mmHg    MV peak gradient 6 mmHg    RV S' 7.27 cm/s    MV VTI 19.17 cm    TDI LATERAL 0.08 m/s    TDI SEPTAL 0.06 m/s    FS 52 %    LA volume 40.09 cm3    LV mass 137.25 g    Left Ventricle Relative Wall Thickness 0.48 cm    AV valve area 2.51 cm2    AV Velocity Ratio 0.83     AV index (prosthetic) 0.81     MV valve area by continuity eq 2.67 cm2    Mean e' 0.07 m/s    Pulm vein S/D ratio 1.25     LVOT area 3.1 cm2    LVOT stroke volume 51.14 cm3    AV peak gradient 5 mmHg    BSA 2.06 m2    LV Systolic Volume Index 15.8 mL/m2    LV Diastolic Volume Index 32.70 mL/m2    LA Volume Index 20.2 mL/m2    LV Mass Index 69 g/m2    LA Volume Index (Mod) 17.7 mL/m2    Right Atrial Pressure (from IVC) 3 mmHg    EF 55 %    Right " ventricular length in diastole (apical 4-chamber view) 4.50 cm    Narrative    · The left ventricle is normal in size with concentric remodeling and   normal systolic function.  · The estimated ejection fraction is 55%.  · Normal left ventricular diastolic function.  · Normal right ventricular size with normal right ventricular systolic   function.  · Mild-to-moderate mitral regurgitation.  · Normal central venous pressure (3 mmHg).        ECG:  normal EKG, normal sinus rhythm, unchanged from previous tracings.    Cath study :  2016    Angiographic Results     Diagnostic:          Patient has a left dominant coronary artery.        - Left Main Coronary Artery:             The ostial LM is normal. There is ARIELA 3 flow.     - Left Anterior Descending Artery:             The LAD is normal. There is ARIELA 3 flow.     - D1:             The D1 has luminal irregularities. There is ARIELA 3 flow.     - Ramus:             The proximal ramus has a 60% stenosis. There is ARIELA 3 flow.     - Left Circumflex Artery:             The LCX is normal. There is ARIELA 3 flow.     - Right Coronary Artery:             The proximal RCA has a 75% stenosis. There is ARIELA 3 flow. The remaining portion of the vessel is of small caliber.       Assessment:     1. Cardiomyopathy, unspecified type    2. Coronary artery disease involving native coronary artery of native heart with angina pectoris    3. Essential hypertension    4. Moderate mitral insufficiency    5. Morbid (severe) obesity due to excess calories        Plan:   - EF recovered  - ContinueToprol to 50  BID  - Continue GDMT  - Valsartan 160 mg daily.   - lasix (prn)   - Stable CAD / will continue ASA/statin   - LDL close to goal.  Increase atorvastatin to 80 mg.  LDL goal lower than 70  - Low salt diet  - Will monitor for mild-moderate MR. Clinically doing very well. NYHA FC II        I spent 5-10 minutes asking, assessing, assisting, arranging and advising heart healthy diet improvements.  This included low-salt meals, portion control and health food alternatives. I also encourage 30 minutes of moderate exercise 3-4x a week.       One year follow-up or sooner p.r.n.    Continue with current medical plan and lifestyle changes.  Return sooner for concerns or questions. If symptoms persist go to the ED  Total duration of face to face visit time 30 minutes.  Total time spent counseling greater than fifty percent of total visit time.  Counseling included discussion regarding imaging findings, diagnosis, possibilities, treatment options, risks and benefits.      No orders of the defined types were placed in this encounter.        Follow up as scheduled. Return to clinic in 6 months  She expressed verbal understanding and agreed with the plan    Thank you for the opportunity to care for this patient. Will be available for questions if needed.

## 2024-05-27 DIAGNOSIS — I11.0 CARDIOMYOPATHY DUE TO HYPERTENSION, WITH HEART FAILURE: ICD-10-CM

## 2024-05-27 DIAGNOSIS — R00.0 TACHYCARDIA: ICD-10-CM

## 2024-05-27 DIAGNOSIS — I43 CARDIOMYOPATHY DUE TO HYPERTENSION, WITH HEART FAILURE: ICD-10-CM

## 2024-05-27 RX ORDER — METOPROLOL SUCCINATE 50 MG/1
50 TABLET, EXTENDED RELEASE ORAL 2 TIMES DAILY
Qty: 180 TABLET | Refills: 0 | Status: SHIPPED | OUTPATIENT
Start: 2024-05-27

## 2024-06-17 NOTE — TELEPHONE ENCOUNTER
----- Message from Dee Donovanrichy Garzary sent at 6/17/2024  1:28 PM CDT -----  Type:  RX Refill Request    Who Called: pt  Refill or New Rx:refill  RX Name and Strength:pantoprazole (PROTONIX) 40 MG tablet  How is the patient currently taking it? (ex. 1XDay):Take 1 tablet (40 mg total) by mouth once daily. -  Is this a 30 day or 90 day RX:30  Preferred Pharmacy with phone number:[facility] [address] [place] [address] [facility]   Phone: [phone]  Fax: [phone]        Local or Mail Order:local  Ordering Provider:Dr Claire   Would the patient rather a call back or a response via [facility]? call  Best Call Back Number:[phone]   Additional Information: pharmacy has been waiting since last week for refill request pleae call pt when sent so she can go pick them up she is completley out of her medication since yesturday

## 2024-06-18 RX ORDER — PANTOPRAZOLE SODIUM 40 MG/1
40 TABLET, DELAYED RELEASE ORAL DAILY
Qty: 30 TABLET | Refills: 11 | Status: SHIPPED | OUTPATIENT
Start: 2024-06-18 | End: 2025-06-18

## 2024-06-19 ENCOUNTER — TELEPHONE (OUTPATIENT)
Dept: GASTROENTEROLOGY | Facility: CLINIC | Age: 69
End: 2024-06-19
Payer: MEDICARE

## 2024-06-19 NOTE — TELEPHONE ENCOUNTER
----- Message from Glenda Vega sent at 6/18/2024  4:18 PM CDT -----  Type:  RX Refill Request    Who Called: daughter   Refill or New Rx:rx  RX Name and Strength:pantoprazole (PROTONIX) 40 MG tablet  Preferred Pharmacy with phone number:[facility] [address] [place] [address] [address] [facility]  Local or Mail Order:local  Ordering Provider:kyle  Would the patient rather a call back or a response via [facility]? call  Best Call Back Number:[phone]  Additional Information:   Caller stated her mother called pharmacy and they said they were unsuccessful with getting into wit office  Caller stated she's out and has been going on a week  Caller is feeling sick w/o this medication

## 2024-08-20 DIAGNOSIS — I11.0 CARDIOMYOPATHY DUE TO HYPERTENSION, WITH HEART FAILURE: ICD-10-CM

## 2024-08-20 DIAGNOSIS — I43 CARDIOMYOPATHY DUE TO HYPERTENSION, WITH HEART FAILURE: ICD-10-CM

## 2024-08-20 DIAGNOSIS — R00.0 TACHYCARDIA: ICD-10-CM

## 2024-08-21 RX ORDER — METOPROLOL SUCCINATE 50 MG/1
50 TABLET, EXTENDED RELEASE ORAL 2 TIMES DAILY
Qty: 180 TABLET | Refills: 0 | Status: SHIPPED | OUTPATIENT
Start: 2024-08-21

## 2024-12-11 DIAGNOSIS — R00.0 TACHYCARDIA: ICD-10-CM

## 2024-12-11 DIAGNOSIS — I43 CARDIOMYOPATHY DUE TO HYPERTENSION, WITH HEART FAILURE: ICD-10-CM

## 2024-12-11 DIAGNOSIS — I11.0 CARDIOMYOPATHY DUE TO HYPERTENSION, WITH HEART FAILURE: ICD-10-CM

## 2024-12-11 RX ORDER — METOPROLOL SUCCINATE 50 MG/1
50 TABLET, EXTENDED RELEASE ORAL 2 TIMES DAILY
Qty: 180 TABLET | Refills: 0 | Status: SHIPPED | OUTPATIENT
Start: 2024-12-11

## 2024-12-31 ENCOUNTER — OFFICE VISIT (OUTPATIENT)
Dept: OTOLARYNGOLOGY | Facility: CLINIC | Age: 69
End: 2024-12-31
Payer: MEDICARE

## 2024-12-31 ENCOUNTER — CLINICAL SUPPORT (OUTPATIENT)
Dept: OTOLARYNGOLOGY | Facility: CLINIC | Age: 69
End: 2024-12-31
Payer: MEDICARE

## 2024-12-31 VITALS
BODY MASS INDEX: 37.54 KG/M2 | DIASTOLIC BLOOD PRESSURE: 84 MMHG | HEART RATE: 89 BPM | WEIGHT: 218.69 LBS | SYSTOLIC BLOOD PRESSURE: 136 MMHG

## 2024-12-31 DIAGNOSIS — H93.8X3 SENSATION OF FULLNESS IN BOTH EARS: ICD-10-CM

## 2024-12-31 DIAGNOSIS — H93.8X9 SENSATION OF FULLNESS IN EAR, UNSPECIFIED LATERALITY: Primary | ICD-10-CM

## 2024-12-31 DIAGNOSIS — H61.23 BILATERAL IMPACTED CERUMEN: Primary | ICD-10-CM

## 2024-12-31 DIAGNOSIS — Z01.10 HEARING EXAM WITHOUT ABNORMAL FINDINGS: ICD-10-CM

## 2024-12-31 PROCEDURE — 3288F FALL RISK ASSESSMENT DOCD: CPT | Mod: CPTII,S$GLB,, | Performed by: NURSE PRACTITIONER

## 2024-12-31 PROCEDURE — 99999 PR PBB SHADOW E&M-EST. PATIENT-LVL III: CPT | Mod: PBBFAC,,, | Performed by: NURSE PRACTITIONER

## 2024-12-31 PROCEDURE — 3075F SYST BP GE 130 - 139MM HG: CPT | Mod: CPTII,S$GLB,, | Performed by: NURSE PRACTITIONER

## 2024-12-31 PROCEDURE — 3079F DIAST BP 80-89 MM HG: CPT | Mod: CPTII,S$GLB,, | Performed by: NURSE PRACTITIONER

## 2024-12-31 PROCEDURE — 1101F PT FALLS ASSESS-DOCD LE1/YR: CPT | Mod: CPTII,S$GLB,, | Performed by: NURSE PRACTITIONER

## 2024-12-31 PROCEDURE — 92556 SPEECH AUDIOMETRY COMPLETE: CPT | Mod: GZ,S$GLB,, | Performed by: PHYSICIAN ASSISTANT

## 2024-12-31 PROCEDURE — 1160F RVW MEDS BY RX/DR IN RCRD: CPT | Mod: CPTII,S$GLB,, | Performed by: NURSE PRACTITIONER

## 2024-12-31 PROCEDURE — 92567 TYMPANOMETRY: CPT | Mod: GZ,S$GLB,, | Performed by: PHYSICIAN ASSISTANT

## 2024-12-31 PROCEDURE — 4010F ACE/ARB THERAPY RXD/TAKEN: CPT | Mod: CPTII,S$GLB,, | Performed by: NURSE PRACTITIONER

## 2024-12-31 PROCEDURE — 1126F AMNT PAIN NOTED NONE PRSNT: CPT | Mod: CPTII,S$GLB,, | Performed by: NURSE PRACTITIONER

## 2024-12-31 PROCEDURE — 99999 PR PBB SHADOW E&M-EST. PATIENT-LVL I: CPT | Mod: PBBFAC,,, | Performed by: PHYSICIAN ASSISTANT

## 2024-12-31 PROCEDURE — 3008F BODY MASS INDEX DOCD: CPT | Mod: CPTII,S$GLB,, | Performed by: NURSE PRACTITIONER

## 2024-12-31 PROCEDURE — 99203 OFFICE O/P NEW LOW 30 MIN: CPT | Mod: 25,S$GLB,, | Performed by: NURSE PRACTITIONER

## 2024-12-31 PROCEDURE — 1159F MED LIST DOCD IN RCRD: CPT | Mod: CPTII,S$GLB,, | Performed by: NURSE PRACTITIONER

## 2024-12-31 PROCEDURE — 92552 PURE TONE AUDIOMETRY AIR: CPT | Mod: GZ,S$GLB,, | Performed by: PHYSICIAN ASSISTANT

## 2024-12-31 PROCEDURE — G0268 REMOVAL OF IMPACTED WAX MD: HCPCS | Mod: S$GLB,,, | Performed by: NURSE PRACTITIONER

## 2024-12-31 NOTE — PROGRESS NOTES
Christina Tomlinson, a 69 y.o. female, was seen today for an audiologic evaluation following an ear cleaning in the ENT clinic.  Ms. Tomlinson had perception of hearing loss prior to having her ears cleaned today but her hearing improved once the ears were cleaned.  She felt her ears were clogged and itchy.  he denies ear pain or ear drainage.  She denies a family history of hearing loss.       Audiogram results revealed normal hearing sensitivity bilaterally. Speech reception thresholds were noted at 10 dB in the right ear and 10 dB in the left ear.  Speech discrimination scores were 100% in the right ear and 100% in the left ear.  Tympanometry revealed Type A in the right ear and Type A in the left ear. The results of the audiogram were reviewed with the patient.     Recommendations:  Otologic evaluation  Annual audiogram  Hearing protection when in noise

## 2024-12-31 NOTE — PROCEDURES
Ear Cerumen Removal    Date/Time: 12/31/2024 10:40 AM    Performed by: Estela Love NP  Authorized by: Estela Love NP    Consent Done?:  Yes (Verbal)    Local anesthetic:  None  Location details:  Both ears  Procedure type: curette    Procedure type comment:  Suction and forceps  Cerumen  Removal Results:  Cerumen completely removed  Patient tolerance:  Patient tolerated the procedure well with no immediate complications

## 2024-12-31 NOTE — PROGRESS NOTES
Patient ID: Christina Tomlinson is a 69 y.o. y.o. female    Chief Complaint:   Chief Complaint   Patient presents with    Cerumen Impaction     Itchy and clogged        Patient is self-referred for evaluation of a possible wax impaction in bilateral ears. She felt her ears were clogged and itchy.   she has complaints of hearing loss in the affected ears, but denies pain or drainage.  This has been an issue in the past.  The patient has not been using any sort of ear drop to soften the wax. She admits to cleaning her ears with Q-tips and hair pins.      Review of Systems   Constitutional: Negative for fever, chills, fatigue and unexpected weight change.   HENT: Positive for ear blockage. Negative for hearing loss, nosebleeds, congestion, sore throat, facial swelling, rhinorrhea, sneezing, trouble swallowing, dental problem, voice change, postnasal drip, sinus pressure, tinnitus and ear discharge.    Eyes: Negative for redness, itching and visual disturbance.   Respiratory: Negative for cough, choking and wheezing.    Cardiovascular: Negative for chest pain and palpitations.   Gastrointestinal: Negative for abdominal pain.        No reflux.   Musculoskeletal: Negative for gait problem.   Skin: Negative for rash.   Neurological: Negative for dizziness, light-headedness and headaches.     Past Medical History:   Diagnosis Date    Arthritis     Cardiomyopathy     CHF (congestive heart failure)     Coronary artery disease     GERD (gastroesophageal reflux disease)     Hypertension      Past Surgical History:   Procedure Laterality Date    CHOLECYSTECTOMY      COLONOSCOPY N/A 2/1/2016    Procedure: COLONOSCOPY;  Surgeon: Aidan Reynoso Jr., MD;  Location: [facility];  Service: Endoscopy;  Laterality: N/A;    CYSTOSCOPY  10/5/2018    Procedure: CYSTOSCOPY;  Surgeon: Everette Quiroz MD;  Location: [address];  Service: Urology;;    ESOPHAGOGASTRODUODENOSCOPY N/A 5/11/2023    Procedure: EGD  (ESOPHAGOGASTRODUODENOSCOPY);  Surgeon: Curt Claire MD;  Location: [facility] ENDO;  Service: Endoscopy;  Laterality: N/A;    HYSTERECTOMY      JOINT REPLACEMENT Right     TKA    KNEE ARTHROSCOPY      LASER LITHOTRIPSY Left 10/5/2018    Procedure: LITHOTRIPSY, USING LASER;  Surgeon: Everette Quiroz MD;  Location: [address];  Service: Urology;  Laterality: Left;    OTHER SURGICAL HISTORY      PARATHYROIDECTOMY      RETROGRADE PYELOGRAPHY Left 10/5/2018    Procedure: PYELOGRAM, RETROGRADE;  Surgeon: Everette Quiroz MD;  Location: [facility] OR [address] [place] [address];  Service: Urology;  Laterality: Left;    TONSILLECTOMY      URETEROSCOPY Left 10/5/2018    Procedure: URETEROSCOPY;  Surgeon: Everette Quiroz MD;  Location: [address];  Service: Urology;  Laterality: Left;  90 min     Social History     Socioeconomic History    Marital status:    Tobacco Use    Smoking status: Never    Smokeless tobacco: Never   Substance and Sexual Activity    Alcohol use: No     Alcohol/week: 0.0 standard drinks of alcohol    Drug use: No    Sexual activity: Not Currently     Family History   Problem Relation Name Age of Onset    Diabetes Father      Kidney disease Father      Heart failure Mother      Heart disease Mother         Objective:      Vitals:    12/31/24 0953   BP: 136/84   Pulse: 89       Physical Exam   Constitutional: Well appearing / communicating without difficutly.  NAD.  Eyes: EOM I Bilaterally  Head/Face: Normocephalic. Negative paranasal sinus pressure/tenderness.  Salivary glands WNL.  House Brackmann I Bilaterally.     Right Ear: Auricle normal appearance. External Auditory Canal with cerumen impaction. EAC with no lesions or masses,TM w/o masses/lesions/perforations. TM mobility noted.   Left Ear: Auricle normal appearance. External Auditory Canal with cerumen impaction. EAC with no lesions or masses,TM w/o masses/lesions/perforations. TM mobility noted.  Nose: No gross nasal septal deviation. Inferior  Turbinates 3+ bilaterally. No septal perforation. No masses/lesions. External nasal skin appears normal without masses/lesions.   Oral Cavity: Gingiva/lips within normal limits.  Dentition/gingiva healthy appearing. Mucus membranes moist. Floor of mouth soft, no masses palpated. Oral Tongue mobile. Hard Palate appears normal.    Oropharynx: Base of tongue appears normal. No masses/lesions noted. Tonsillar fossa/pharyngeal wall without lesions. Posterior oropharynx WNL.  Soft palate without masses. Midline uvula.   Neck/Lymphatic: No LAD I-VI bilaterally.  No thyromegaly.  No masses noted on exam.     Mirror laryngoscopy/nasopharyngoscopy: Active gag reflex.  Unable to perform.     Neuro/Psychiatric: AOx3.  Normal mood and affect.   Cardiovascular: Normal carotid pulses bilaterally, no increasing jugular venous distention noted at cervical region bilaterally.    Respiratory: Normal respiratory effort, no stridor, no retractions noted.      Cerumen removal under binocular microscopy   Ear Cerumen Removal     Date/Time: 12/31/2024 10:40 AM     Performed by: Estela Love NP  Authorized by: Estela Love NP    Consent Done?:  Yes (Verbal)     Local anesthetic:  None  Location details:  Both ears  Procedure type: curette    Procedure type comment:  Suction and forceps  Cerumen  Removal Results:  Cerumen completely removed  Patient tolerance:  Patient tolerated the procedure well with no immediate complications       Audiogram interpreted personally by me and discussed in detail with the patient today.   Audiogram results revealed normal hearing sensitivity bilaterally. Speech reception thresholds were noted at 10 dB in the right ear and 10 dB in the left ear.  Speech discrimination scores were 100% in the right ear and 100% in the left ear.  Tympanometry revealed Type A in the right ear and Type A in the left ear       Assessment:         ICD-10-CM ICD-9-CM    1. Bilateral impacted cerumen  H61.23 380.4 Ear  Cerumen Removal      2. Sensation of fullness in both ears  H93.8X3 388.8       3. Hearing exam without abnormal findings  Z01.10 V72.19            Plan:       -  Cerumen impaction:  Removed under microscopy today without difficulty.  I would recommend the use of a wax softening drop, either over the counter Debrox or mineral oil, on a weekly basis.  I also instructed the patient to avoid Qtips.  -audiogram revealed normal hearing in bilateral ears  -follow up 1 year or sooner as needed         Estela Love NP      Answers submitted by the patient for this visit:  Review of Symptoms Questionnaire  (Submitted on 12/24/2024)  None of these: Yes  postnasal drip: Yes  mouth sores: Yes  None of these : Yes  shortness of breath: Yes  None of these : Yes  None of these: Yes  None of these: Yes  back pain: Yes  rash: Yes  None of these: Yes  None of these : Yes  None of these: Yes  None of these: Yes  None of these: Yes

## 2025-01-05 NOTE — TELEPHONE ENCOUNTER
----- Message from Gisel Herman sent at 1/4/2023  3:40 PM CST -----  Type:  RX Refill Request    Who Called: Pt   Refill or New Rx:refill   RX Name and Strength:valsartan (DIOVAN) 320 MG tablet  How is the patient currently taking it? (ex. 1XDay):1  Is this a 30 day or 90 day RX:90  Preferred Pharmacy with phone number:[facility] [address] [place] [address] [facility]   Phone: [phone]  Fax:  [phone]  Would the patient rather a call back or a response via [facility]? Call back   Best Call Back Number:[phone]  Additional Information: pt only have 4 pills left         
Maria L Art

## 2025-02-18 ENCOUNTER — LAB VISIT (OUTPATIENT)
Dept: LAB | Facility: HOSPITAL | Age: 70
End: 2025-02-18
Attending: PODIATRIST
Payer: MEDICARE

## 2025-02-18 DIAGNOSIS — B35.1 DERMATOPHYTOSIS OF NAIL: Primary | ICD-10-CM

## 2025-02-18 LAB
ALT SERPL W/O P-5'-P-CCNC: 20 U/L (ref 10–44)
AST SERPL-CCNC: 40 U/L (ref 15–46)

## 2025-02-18 PROCEDURE — 36415 COLL VENOUS BLD VENIPUNCTURE: CPT | Mod: PN | Performed by: PODIATRIST

## 2025-02-18 PROCEDURE — 84450 TRANSFERASE (AST) (SGOT): CPT | Mod: PN | Performed by: PODIATRIST

## 2025-02-18 PROCEDURE — 84460 ALANINE AMINO (ALT) (SGPT): CPT | Mod: PN | Performed by: PODIATRIST

## 2025-02-27 ENCOUNTER — LAB VISIT (OUTPATIENT)
Dept: LAB | Facility: HOSPITAL | Age: 70
End: 2025-02-27
Attending: INTERNAL MEDICINE
Payer: MEDICARE

## 2025-02-27 DIAGNOSIS — N18.30 STAGE 3 CHRONIC KIDNEY DISEASE, UNSPECIFIED WHETHER STAGE 3A OR 3B CKD: ICD-10-CM

## 2025-02-27 LAB
ALBUMIN SERPL BCP-MCNC: 3.5 G/DL (ref 3.5–5.2)
ANION GAP SERPL CALC-SCNC: 10 MMOL/L (ref 8–16)
BASOPHILS # BLD AUTO: 0.07 K/UL (ref 0–0.2)
BASOPHILS NFR BLD: 1 % (ref 0–1.9)
BUN SERPL-MCNC: 14 MG/DL (ref 8–23)
CALCIUM SERPL-MCNC: 9.4 MG/DL (ref 8.7–10.5)
CHLORIDE SERPL-SCNC: 104 MMOL/L (ref 95–110)
CO2 SERPL-SCNC: 28 MMOL/L (ref 23–29)
CREAT SERPL-MCNC: 1.1 MG/DL (ref 0.5–1.4)
DIFFERENTIAL METHOD BLD: ABNORMAL
EOSINOPHIL # BLD AUTO: 0.4 K/UL (ref 0–0.5)
EOSINOPHIL NFR BLD: 5.7 % (ref 0–8)
ERYTHROCYTE [DISTWIDTH] IN BLOOD BY AUTOMATED COUNT: 13.8 % (ref 11.5–14.5)
EST. GFR  (NO RACE VARIABLE): 54 ML/MIN/1.73 M^2
GLUCOSE SERPL-MCNC: 90 MG/DL (ref 70–110)
HCT VFR BLD AUTO: 35.1 % (ref 37–48.5)
HGB BLD-MCNC: 11 G/DL (ref 12–16)
IMM GRANULOCYTES # BLD AUTO: 0.02 K/UL (ref 0–0.04)
IMM GRANULOCYTES NFR BLD AUTO: 0.3 % (ref 0–0.5)
LYMPHOCYTES # BLD AUTO: 1.9 K/UL (ref 1–4.8)
LYMPHOCYTES NFR BLD: 26.7 % (ref 18–48)
MAGNESIUM SERPL-MCNC: 1.6 MG/DL (ref 1.6–2.6)
MCH RBC QN AUTO: 29.2 PG (ref 27–31)
MCHC RBC AUTO-ENTMCNC: 31.3 G/DL (ref 32–36)
MCV RBC AUTO: 93 FL (ref 82–98)
MONOCYTES # BLD AUTO: 0.6 K/UL (ref 0.3–1)
MONOCYTES NFR BLD: 8.1 % (ref 4–15)
NEUTROPHILS # BLD AUTO: 4.1 K/UL (ref 1.8–7.7)
NEUTROPHILS NFR BLD: 58.2 % (ref 38–73)
NRBC BLD-RTO: 0 /100 WBC
PHOSPHATE SERPL-MCNC: 3 MG/DL (ref 2.7–4.5)
PLATELET # BLD AUTO: 192 K/UL (ref 150–450)
PMV BLD AUTO: 10.2 FL (ref 9.2–12.9)
POTASSIUM SERPL-SCNC: 3.6 MMOL/L (ref 3.5–5.1)
PTH-INTACT SERPL-MCNC: 223.7 PG/ML (ref 9–77)
RBC # BLD AUTO: 3.77 M/UL (ref 4–5.4)
SODIUM SERPL-SCNC: 142 MMOL/L (ref 136–145)
WBC # BLD AUTO: 7.01 K/UL (ref 3.9–12.7)

## 2025-02-27 PROCEDURE — 80048 BASIC METABOLIC PNL TOTAL CA: CPT | Performed by: INTERNAL MEDICINE

## 2025-02-27 PROCEDURE — 83970 ASSAY OF PARATHORMONE: CPT | Performed by: INTERNAL MEDICINE

## 2025-02-27 PROCEDURE — 84100 ASSAY OF PHOSPHORUS: CPT | Performed by: INTERNAL MEDICINE

## 2025-02-27 PROCEDURE — 82040 ASSAY OF SERUM ALBUMIN: CPT | Performed by: INTERNAL MEDICINE

## 2025-02-27 PROCEDURE — 85025 COMPLETE CBC W/AUTO DIFF WBC: CPT | Performed by: INTERNAL MEDICINE

## 2025-02-27 PROCEDURE — 36415 COLL VENOUS BLD VENIPUNCTURE: CPT | Performed by: INTERNAL MEDICINE

## 2025-02-27 PROCEDURE — 83735 ASSAY OF MAGNESIUM: CPT | Performed by: INTERNAL MEDICINE

## 2025-03-09 DIAGNOSIS — I43 CARDIOMYOPATHY DUE TO HYPERTENSION, WITH HEART FAILURE: ICD-10-CM

## 2025-03-09 DIAGNOSIS — R00.0 TACHYCARDIA: ICD-10-CM

## 2025-03-09 DIAGNOSIS — I11.0 CARDIOMYOPATHY DUE TO HYPERTENSION, WITH HEART FAILURE: ICD-10-CM

## 2025-03-10 RX ORDER — METOPROLOL SUCCINATE 50 MG/1
50 TABLET, EXTENDED RELEASE ORAL 2 TIMES DAILY
Qty: 180 TABLET | Refills: 0 | Status: SHIPPED | OUTPATIENT
Start: 2025-03-10

## 2025-03-15 DIAGNOSIS — I10 ESSENTIAL HYPERTENSION: ICD-10-CM

## 2025-03-17 RX ORDER — VALSARTAN 160 MG/1
160 TABLET ORAL
Qty: 90 TABLET | Refills: 0 | Status: SHIPPED | OUTPATIENT
Start: 2025-03-17

## 2025-05-23 ENCOUNTER — LAB VISIT (OUTPATIENT)
Dept: LAB | Facility: HOSPITAL | Age: 70
End: 2025-05-23
Attending: NURSE PRACTITIONER
Payer: MEDICARE

## 2025-05-23 DIAGNOSIS — E83.42 HYPOMAGNESEMIA: ICD-10-CM

## 2025-05-23 DIAGNOSIS — M25.40 EFFUSION OF JOINT, MULTIPLE SITES: ICD-10-CM

## 2025-05-23 DIAGNOSIS — M25.50 PAIN IN JOINT, MULTIPLE SITES: ICD-10-CM

## 2025-05-23 DIAGNOSIS — I10 ESSENTIAL (PRIMARY) HYPERTENSION: Primary | ICD-10-CM

## 2025-05-23 DIAGNOSIS — T31.80: ICD-10-CM

## 2025-05-23 DIAGNOSIS — I42.9 CARDIOMYOPATHY, UNSPECIFIED TYPE: ICD-10-CM

## 2025-05-23 DIAGNOSIS — I10 ESSENTIAL HYPERTENSION: ICD-10-CM

## 2025-05-23 LAB
ABSOLUTE EOSINOPHIL (OHS): 0.28 K/UL
ABSOLUTE MONOCYTE (OHS): 0.35 K/UL (ref 0.3–1)
ABSOLUTE NEUTROPHIL COUNT (OHS): 3.81 K/UL (ref 1.8–7.7)
ALBUMIN SERPL BCP-MCNC: 3.9 G/DL (ref 3.5–5.2)
ALP SERPL-CCNC: 80 UNIT/L (ref 38–126)
ALT SERPL W/O P-5'-P-CCNC: 17 UNIT/L (ref 10–44)
ANION GAP (OHS): 9 MMOL/L (ref 8–16)
AST SERPL-CCNC: 26 UNIT/L (ref 15–46)
BASOPHILS # BLD AUTO: 0.05 K/UL
BASOPHILS NFR BLD AUTO: 0.8 %
BILIRUB SERPL-MCNC: 0.4 MG/DL (ref 0.1–1)
BUN SERPL-MCNC: 15 MG/DL (ref 7–17)
CALCIUM SERPL-MCNC: 9.3 MG/DL (ref 8.7–10.5)
CHLORIDE SERPL-SCNC: 103 MMOL/L (ref 95–110)
CO2 SERPL-SCNC: 27 MMOL/L (ref 23–29)
CREAT SERPL-MCNC: 1.2 MG/DL (ref 0.5–1.4)
CRP SERPL-MCNC: 0.81 MG/DL
ERYTHROCYTE [DISTWIDTH] IN BLOOD BY AUTOMATED COUNT: 12.9 % (ref 11.5–14.5)
ERYTHROCYTE [SEDIMENTATION RATE] IN BLOOD BY PHOTOMETRIC METHOD: 10 MM/HR
GFR SERPLBLD CREATININE-BSD FMLA CKD-EPI: 49 ML/MIN/1.73/M2
GLUCOSE SERPL-MCNC: 108 MG/DL (ref 70–110)
HCT VFR BLD AUTO: 35.3 % (ref 37–48.5)
HGB BLD-MCNC: 11.4 GM/DL (ref 12–16)
IMM GRANULOCYTES # BLD AUTO: 0.02 K/UL (ref 0–0.04)
IMM GRANULOCYTES NFR BLD AUTO: 0.3 % (ref 0–0.5)
LYMPHOCYTES # BLD AUTO: 1.63 K/UL (ref 1–4.8)
MCH RBC QN AUTO: 29.6 PG (ref 27–31)
MCHC RBC AUTO-ENTMCNC: 32.3 G/DL (ref 32–36)
MCV RBC AUTO: 92 FL (ref 82–98)
NUCLEATED RBC (/100WBC) (OHS): 0 /100 WBC
PLATELET # BLD AUTO: 206 K/UL (ref 150–450)
PMV BLD AUTO: 10.4 FL (ref 9.2–12.9)
POTASSIUM SERPL-SCNC: 3.7 MMOL/L (ref 3.5–5.1)
PROT SERPL-MCNC: 7 GM/DL (ref 6–8.4)
RBC # BLD AUTO: 3.85 M/UL (ref 4–5.4)
RELATIVE EOSINOPHIL (OHS): 4.6 %
RELATIVE LYMPHOCYTE (OHS): 26.5 % (ref 18–48)
RELATIVE MONOCYTE (OHS): 5.7 % (ref 4–15)
RELATIVE NEUTROPHIL (OHS): 62.1 % (ref 38–73)
SODIUM SERPL-SCNC: 139 MMOL/L (ref 136–145)
WBC # BLD AUTO: 6.14 K/UL (ref 3.9–12.7)

## 2025-05-23 PROCEDURE — 36415 COLL VENOUS BLD VENIPUNCTURE: CPT | Mod: PN

## 2025-05-23 PROCEDURE — 85652 RBC SED RATE AUTOMATED: CPT | Mod: PN

## 2025-05-23 PROCEDURE — 85025 COMPLETE CBC W/AUTO DIFF WBC: CPT | Mod: PN

## 2025-05-23 PROCEDURE — 86140 C-REACTIVE PROTEIN: CPT | Mod: PN

## 2025-05-23 PROCEDURE — 84460 ALANINE AMINO (ALT) (SGPT): CPT | Mod: PN

## 2025-05-25 RX ORDER — ATORVASTATIN CALCIUM 80 MG/1
80 TABLET, FILM COATED ORAL NIGHTLY
Qty: 90 TABLET | Refills: 1 | Status: SHIPPED | OUTPATIENT
Start: 2025-05-25

## 2025-05-25 RX ORDER — FUROSEMIDE 40 MG/1
40 TABLET ORAL
Qty: 90 TABLET | Refills: 1 | Status: SHIPPED | OUTPATIENT
Start: 2025-05-25

## 2025-06-05 DIAGNOSIS — I43 CARDIOMYOPATHY DUE TO HYPERTENSION, WITH HEART FAILURE: ICD-10-CM

## 2025-06-05 DIAGNOSIS — I11.0 CARDIOMYOPATHY DUE TO HYPERTENSION, WITH HEART FAILURE: ICD-10-CM

## 2025-06-05 DIAGNOSIS — R00.0 TACHYCARDIA: ICD-10-CM

## 2025-06-05 RX ORDER — METOPROLOL SUCCINATE 50 MG/1
50 TABLET, EXTENDED RELEASE ORAL 2 TIMES DAILY
Qty: 180 TABLET | Refills: 0 | Status: SHIPPED | OUTPATIENT
Start: 2025-06-05

## 2025-06-10 DIAGNOSIS — I10 ESSENTIAL HYPERTENSION: ICD-10-CM

## 2025-06-10 RX ORDER — VALSARTAN 160 MG/1
160 TABLET ORAL
Qty: 90 TABLET | Refills: 0 | Status: SHIPPED | OUTPATIENT
Start: 2025-06-10

## 2025-06-28 RX ORDER — PANTOPRAZOLE SODIUM 40 MG/1
40 TABLET, DELAYED RELEASE ORAL
Qty: 90 TABLET | Refills: 0 | Status: SHIPPED | OUTPATIENT
Start: 2025-06-28

## (undated) DEVICE — PACK CYSTO

## (undated) DEVICE — SOL IRR NACL .9% 3000ML

## (undated) DEVICE — SET Y-TYPE TUR IRRIGATION

## (undated) DEVICE — SYR ONLY LUER LOCK 20CC

## (undated) DEVICE — CATH 5FR OPEN END URETERAL

## (undated) DEVICE — GOWN X-LG STERILE BACK

## (undated) DEVICE — GLOVE BIOGEL 7.5

## (undated) DEVICE — TRAY CYSTO BASIN

## (undated) DEVICE — SYR 10CC LUER LOCK

## (undated) DEVICE — EXTRACTOR TIPLESS 2.4FRX1115CM